# Patient Record
Sex: MALE | Race: BLACK OR AFRICAN AMERICAN | Employment: OTHER | URBAN - METROPOLITAN AREA
[De-identification: names, ages, dates, MRNs, and addresses within clinical notes are randomized per-mention and may not be internally consistent; named-entity substitution may affect disease eponyms.]

---

## 2018-06-22 ENCOUNTER — HOSPITAL ENCOUNTER (OUTPATIENT)
Age: 73
Discharge: HOME | End: 2018-06-24
Payer: MEDICARE

## 2018-06-22 LAB — PROSTATE SPECIFIC ANTIGEN: 9.53 NG/ML (ref 0–4)

## 2018-06-22 PROCEDURE — 36415 COLL VENOUS BLD VENIPUNCTURE: CPT

## 2018-06-22 PROCEDURE — G0103 PSA SCREENING: HCPCS

## 2018-07-03 ENCOUNTER — HOSPITAL ENCOUNTER (OUTPATIENT)
Age: 73
Discharge: HOME | End: 2018-07-03
Payer: MEDICARE

## 2018-07-03 LAB
BUN BLDV-MCNC: 16 MG/DL (ref 8–23)
CREAT SERPL-MCNC: 1.1 MG/DL (ref 0.7–1.2)
GFR AFRICAN AMERICAN: >60
GFR NON-AFRICAN AMERICAN: >60 ML/MIN/1.73

## 2018-07-03 PROCEDURE — 82565 ASSAY OF CREATININE: CPT

## 2018-07-03 PROCEDURE — 84520 ASSAY OF UREA NITROGEN: CPT

## 2018-07-03 PROCEDURE — 36415 COLL VENOUS BLD VENIPUNCTURE: CPT

## 2019-05-22 DIAGNOSIS — M25.562 LEFT KNEE PAIN, UNSPECIFIED CHRONICITY: Primary | ICD-10-CM

## 2019-05-24 ENCOUNTER — OFFICE VISIT (OUTPATIENT)
Dept: ORTHOPEDIC SURGERY | Age: 74
End: 2019-05-24
Payer: MEDICARE

## 2019-05-24 VITALS — HEIGHT: 71 IN | BODY MASS INDEX: 27.3 KG/M2 | WEIGHT: 195 LBS

## 2019-05-24 DIAGNOSIS — M17.12 PRIMARY OSTEOARTHRITIS OF LEFT KNEE: Primary | ICD-10-CM

## 2019-05-24 PROCEDURE — 99203 OFFICE O/P NEW LOW 30 MIN: CPT | Performed by: ORTHOPAEDIC SURGERY

## 2019-05-24 RX ORDER — AMLODIPINE BESYLATE 10 MG/1
TABLET ORAL
Refills: 3 | COMMUNITY
Start: 2019-05-03

## 2019-05-24 RX ORDER — LISINOPRIL AND HYDROCHLOROTHIAZIDE 25; 20 MG/1; MG/1
TABLET ORAL
Refills: 3 | COMMUNITY
Start: 2019-05-03

## 2019-05-24 RX ORDER — PROPRANOLOL HYDROCHLORIDE 20 MG/1
TABLET ORAL
Refills: 3 | COMMUNITY
Start: 2019-03-14

## 2019-05-24 NOTE — PROGRESS NOTES
Abdullahi Greenwood is a 74 y.o. male, who presents   Chief Complaint   Patient presents with    New Patient     new pateint for left knee pain, patient has had this pain for years, patient denies wearing a brace, Pt or injections. HPI[de-identified] Problems been present for sometime left knee. There are lesser problems with the right. There is a feeling grittiness or grinding in the left knee with range of motion. Ambulation is altered someways as our other activities. There's been a little bit of swelling in the knee on occasion. Discomfort is present more on the medial side than the lateral side. Also been feeling of instability at times. Treatments been with glucosamine and a brace in the past. Injections and no surgery. Allergies; medications; past medical, surgical, family, and social history; and problem list have been reviewed today and updated as indicated in this encounter - see below following Ortho specifics. Musculoskeletal: Skin condition gross neurovascular function is good in both lower extremities. There is some varus malalignment of both knees. The right knee range of motion is near 0-110° of flexion with some crepitus throughout the range and some medial gapping indicative of wear in the medial compartment. There is not much pain associated with this. The left knee is more tender with pain on palpation the medial joint line. There is varus malalignment and medial gapping with stress examination. Conner test was not specific for meniscus pathology. There is minimal effusion. Patellofemoral alignment is good. There is also a slight amount of anterior laxity of cruciates in the left knee. Single leg stance is maintained and gait is a little slow but fairly well balanced. Radiologic Studies: Imaging 2 views of the left knee showed varus malalignment of the knee with medial bone-on-bone contact with hypertrophic marginal changes. There is also evidence of patellofemoral wear.     ASSESSMENT:  Anuj Posadas was seen today for new patient. Diagnoses and all orders for this visit:    Primary osteoarthritis of left knee     Treatment alternatives were reviewed including medical and physical therapies, injections, and surgical options, expected risks benefits and likely outcome of each were discussed in detail, questions asked and answered and understood. Options included injections which she mentioned. We also discussed total knee replacement which would be the definitive if not desirable to him at the present time. We went over the potential risks and benefits of injection with viscus supplements in the left knee and also the approval process to assure insurance coverage and minimizes out-of-pocket expense is. PLAN: We'll seek approval for viscus supplement injections left knee. Discuss knee replacement in the future if he is interested. There is no problem list on file for this patient. Past Medical History:   Diagnosis Date    Anxiety     Hyperlipidemia     Hypertension        Past Surgical History:   Procedure Laterality Date    ADRENALECTOMY      COLONOSCOPY      PROSTATE SURGERY      UPPER GASTROINTESTINAL ENDOSCOPY         Current Outpatient Medications   Medication Sig Dispense Refill    amLODIPine (NORVASC) 10 MG tablet TAKE ONE TABLET BY MOUTH EVERY DAY  3    lisinopril-hydrochlorothiazide (PRINZIDE;ZESTORETIC) 20-25 MG per tablet TAKE ONE TABLET BY MOUTH EVERY DAY  3    propranolol (INDERAL) 20 MG tablet TAKE 1 TABLET BY MOUTH 3 TIMES A DAY  3     No current facility-administered medications for this visit.         No Known Allergies    Social History     Socioeconomic History    Marital status:      Spouse name: None    Number of children: None    Years of education: None    Highest education level: None   Occupational History    None   Social Needs    Financial resource strain: None    Food insecurity:     Worry: None     Inability: None    Transportation needs: Medical: None     Non-medical: None   Tobacco Use    Smoking status: Former Smoker    Smokeless tobacco: Never Used   Substance and Sexual Activity    Alcohol use: Yes     Alcohol/week: 0.6 oz     Types: 1 Glasses of wine per week     Comment: social    Drug use: No    Sexual activity: Never   Lifestyle    Physical activity:     Days per week: None     Minutes per session: None    Stress: None   Relationships    Social connections:     Talks on phone: None     Gets together: None     Attends Orthodox service: None     Active member of club or organization: None     Attends meetings of clubs or organizations: None     Relationship status: None    Intimate partner violence:     Fear of current or ex partner: None     Emotionally abused: None     Physically abused: None     Forced sexual activity: None   Other Topics Concern    None   Social History Narrative    None       History reviewed. No pertinent family history. Review of Systems:   As follows except as previously noted in HPI:  Constitutional: Negative for chills, diaphoresis,  fever   Respiratory: Negative for cough, shortness of breath and wheezing. Cardiovascular: Negative for chest pain and palpitations. Neurological: Negative for dizziness, syncope,   GI / : abdominal pain or cramping  Musculoskeletal: see HPI       Objective:   Physical Exam   Constitutional: Oriented to person, place, and time. and appears well-developed and well-nourished. :   Head: Normocephalic and atraumatic. Neck: Neck supple. Eyes: EOM are normal.   Pulmonary/Chest: Effort normal.  No respiratory distress, no wheezes. Neurological: Alert and oriented to person  Skin: Skin is warm and dry. Jean Torre DO    5/24/19  9:51 AM    All reasonable efforts have been made to minimize the risk of errors that may occur in the use of voice recognition and other electronic means of charting.

## 2019-06-19 ENCOUNTER — OFFICE VISIT (OUTPATIENT)
Dept: ORTHOPEDIC SURGERY | Age: 74
End: 2019-06-19
Payer: MEDICARE

## 2019-06-19 DIAGNOSIS — M17.12 PRIMARY OSTEOARTHRITIS OF LEFT KNEE: Primary | ICD-10-CM

## 2019-06-19 PROCEDURE — 99213 OFFICE O/P EST LOW 20 MIN: CPT | Performed by: ORTHOPAEDIC SURGERY

## 2019-06-19 NOTE — PROGRESS NOTES
Chief Complaint: No chief complaint on file. Edsel Mortimer is in for Synvisc in the left knee. He said no problems with the it would contraindicate the injection itself. Allergies; medications; past medical, surgical, family, and social history; and problem list have been reviewed today and updated as indicated in this encounter seen below. Exam: Condition and gross neurovascular function are good in the left knee with no inflammation. ASSESSMENT:    Diagnoses and all orders for this visit:    Primary osteoarthritis of left knee        PLAN: Aspiration / injection of the left knee with Synvisc ONEwas discussed with the patient. Discussion included but was not limited to potential risks and benefits. No contraindications to the procedure were noted. Understanding and agreement was indicated. The procedure was accomplished without incident using appropriate aseptic technique. Recovered was 6 cc yellow    joint fluid discarded  follow up as needed      Return if symptoms worsen or fail to improve. Current Outpatient Medications   Medication Sig Dispense Refill    amLODIPine (NORVASC) 10 MG tablet TAKE ONE TABLET BY MOUTH EVERY DAY  3    lisinopril-hydrochlorothiazide (PRINZIDE;ZESTORETIC) 20-25 MG per tablet TAKE ONE TABLET BY MOUTH EVERY DAY  3    propranolol (INDERAL) 20 MG tablet TAKE 1 TABLET BY MOUTH 3 TIMES A DAY  3     No current facility-administered medications for this visit. There is no problem list on file for this patient.       Past Medical History:   Diagnosis Date    Anxiety     Hyperlipidemia     Hypertension        Past Surgical History:   Procedure Laterality Date    ADRENALECTOMY      COLONOSCOPY      PROSTATE SURGERY      UPPER GASTROINTESTINAL ENDOSCOPY         No Known Allergies    Social History     Socioeconomic History    Marital status:      Spouse name: None    Number of children: None    Years of education: None    Highest education level: None   Occupational History    None   Social Needs    Financial resource strain: None    Food insecurity:     Worry: None     Inability: None    Transportation needs:     Medical: None     Non-medical: None   Tobacco Use    Smoking status: Former Smoker    Smokeless tobacco: Never Used   Substance and Sexual Activity    Alcohol use: Yes     Alcohol/week: 0.6 oz     Types: 1 Glasses of wine per week     Comment: social    Drug use: No    Sexual activity: Never   Lifestyle    Physical activity:     Days per week: None     Minutes per session: None    Stress: None   Relationships    Social connections:     Talks on phone: None     Gets together: None     Attends [employer]ist service: None     Active member of club or organization: None     Attends meetings of clubs or organizations: None     Relationship status: None    Intimate partner violence:     Fear of current or ex partner: None     Emotionally abused: None     Physically abused: None     Forced sexual activity: None   Other Topics Concern    None   Social History Narrative    None       Review of Systems  As follows except as previously noted in HPI:  Constitutional: Negative for chills, diaphoresis, fatigue, fever and unexpected weight change. Respiratory: Negative for cough, shortness of breath and wheezing. Cardiovascular: Negative for chest pain and palpitations. Neurological: Negative for dizziness, syncope, cephalgia. GI / : negative  Musculoskeletal: see HPI       Objective:   Physical Exam   Constitutional: Oriented to person, place, and time. and appears well-developed and well-nourished. :   Head: Normocephalic and atraumatic. Eyes: EOM are normal.   Neck: Neck supple. Cardiovascular: Normal rate and regular rhythm. Pulmonary/Chest: Effort normal. No stridor. No respiratory distress, no wheezes. Abdominal:  No abnormal distension. Neurological: Alert and oriented to person, place, and time.    Skin: Skin is warm and dry. Psychiatric: Normal mood and affect.  Behavior is normal. Thought content normal.    MAYA Reyes DO    6/19/19  10:41 AM

## 2019-10-29 ENCOUNTER — HOSPITAL ENCOUNTER (OUTPATIENT)
Age: 74
Discharge: HOME | End: 2019-10-29
Payer: MEDICARE

## 2019-10-29 LAB
ALBUMIN SERPL-MCNC: 4.1 G/DL (ref 3.5–5.2)
ALP BLD-CCNC: 73 U/L (ref 40–129)
ALT SERPL-CCNC: 19 U/L (ref 0–40)
ANION GAP SERPL CALCULATED.3IONS-SCNC: 8 MMOL/L (ref 7–16)
AST SERPL-CCNC: 20 U/L (ref 0–39)
BASOPHILS ABSOLUTE: 0.03 E9/L (ref 0–0.2)
BASOPHILS RELATIVE PERCENT: 0.7 % (ref 0–2)
BILIRUB SERPL-MCNC: 0.4 MG/DL (ref 0–1.2)
BUN BLDV-MCNC: 14 MG/DL (ref 8–23)
CALCIUM SERPL-MCNC: 9.5 MG/DL (ref 8.6–10.2)
CHLORIDE BLD-SCNC: 103 MMOL/L (ref 98–107)
CHOLESTEROL, FASTING: 219 MG/DL (ref 0–199)
CO2: 32 MMOL/L (ref 22–29)
CREAT SERPL-MCNC: 1 MG/DL (ref 0.7–1.2)
EOSINOPHILS ABSOLUTE: 0.15 E9/L (ref 0.05–0.5)
EOSINOPHILS RELATIVE PERCENT: 3.3 % (ref 0–6)
GFR AFRICAN AMERICAN: >60
GFR NON-AFRICAN AMERICAN: >60 ML/MIN/1.73
GLUCOSE FASTING: 101 MG/DL (ref 74–99)
HCT VFR BLD CALC: 39.5 % (ref 37–54)
HDLC SERPL-MCNC: 58 MG/DL
HEMOGLOBIN: 13.8 G/DL (ref 12.5–16.5)
IMMATURE GRANULOCYTES #: 0.01 E9/L
IMMATURE GRANULOCYTES %: 0.2 % (ref 0–5)
LDL CHOLESTEROL CALCULATED: 134 MG/DL (ref 0–99)
LYMPHOCYTES ABSOLUTE: 2.3 E9/L (ref 1.5–4)
LYMPHOCYTES RELATIVE PERCENT: 50.9 % (ref 20–42)
MCH RBC QN AUTO: 33.6 PG (ref 26–35)
MCHC RBC AUTO-ENTMCNC: 34.9 % (ref 32–34.5)
MCV RBC AUTO: 96.1 FL (ref 80–99.9)
MONOCYTES ABSOLUTE: 0.4 E9/L (ref 0.1–0.95)
MONOCYTES RELATIVE PERCENT: 8.8 % (ref 2–12)
NEUTROPHILS ABSOLUTE: 1.63 E9/L (ref 1.8–7.3)
NEUTROPHILS RELATIVE PERCENT: 36.1 % (ref 43–80)
PDW BLD-RTO: 12.3 FL (ref 11.5–15)
PLATELET # BLD: 254 E9/L (ref 130–450)
PMV BLD AUTO: 8.7 FL (ref 7–12)
POTASSIUM SERPL-SCNC: 3.8 MMOL/L (ref 3.5–5)
RBC # BLD: 4.11 E12/L (ref 3.8–5.8)
SODIUM BLD-SCNC: 143 MMOL/L (ref 132–146)
T4 FREE: 1.15 NG/DL (ref 0.93–1.7)
TOTAL PROTEIN: 7.5 G/DL (ref 6.4–8.3)
TRIGLYCERIDE, FASTING: 133 MG/DL (ref 0–149)
TSH SERPL DL<=0.05 MIU/L-ACNC: 1.84 UIU/ML (ref 0.27–4.2)
VLDLC SERPL CALC-MCNC: 27 MG/DL
WBC # BLD: 4.5 E9/L (ref 4.5–11.5)

## 2019-10-29 PROCEDURE — 36415 COLL VENOUS BLD VENIPUNCTURE: CPT

## 2019-10-29 PROCEDURE — 80053 COMPREHEN METABOLIC PANEL: CPT

## 2019-10-29 PROCEDURE — 80061 LIPID PANEL: CPT

## 2019-10-29 PROCEDURE — 84443 ASSAY THYROID STIM HORMONE: CPT

## 2019-10-29 PROCEDURE — 85025 COMPLETE CBC W/AUTO DIFF WBC: CPT

## 2019-10-29 PROCEDURE — 84439 ASSAY OF FREE THYROXINE: CPT

## 2019-12-02 ENCOUNTER — HOSPITAL ENCOUNTER (OUTPATIENT)
Age: 74
Discharge: HOME | End: 2019-12-02
Payer: MEDICARE

## 2019-12-02 LAB
CHOLESTEROL, FASTING: 245 MG/DL (ref 0–199)
HDLC SERPL-MCNC: 68 MG/DL
LDL CHOLESTEROL CALCULATED: 154 MG/DL (ref 0–99)
TRIGLYCERIDE, FASTING: 115 MG/DL (ref 0–149)
VLDLC SERPL CALC-MCNC: 23 MG/DL

## 2019-12-02 PROCEDURE — 80061 LIPID PANEL: CPT

## 2019-12-02 PROCEDURE — 36415 COLL VENOUS BLD VENIPUNCTURE: CPT

## 2021-08-05 ENCOUNTER — HOSPITAL ENCOUNTER (OUTPATIENT)
Age: 76
Discharge: HOME | End: 2021-08-05
Payer: MEDICARE

## 2021-08-05 LAB
ALBUMIN SERPL-MCNC: 4.2 G/DL (ref 3.5–5.2)
ALP BLD-CCNC: 74 U/L (ref 40–129)
ALT SERPL-CCNC: 24 U/L (ref 0–40)
ANION GAP SERPL CALCULATED.3IONS-SCNC: 7 MMOL/L (ref 7–16)
AST SERPL-CCNC: 25 U/L (ref 0–39)
BASOPHILS ABSOLUTE: 0.03 E9/L (ref 0–0.2)
BASOPHILS RELATIVE PERCENT: 0.7 % (ref 0–2)
BILIRUB SERPL-MCNC: 0.5 MG/DL (ref 0–1.2)
BUN BLDV-MCNC: 14 MG/DL (ref 6–23)
CALCIUM SERPL-MCNC: 9.3 MG/DL (ref 8.6–10.2)
CHLORIDE BLD-SCNC: 104 MMOL/L (ref 98–107)
CHOLESTEROL, TOTAL: 178 MG/DL (ref 0–199)
CO2: 30 MMOL/L (ref 22–29)
CREAT SERPL-MCNC: 0.9 MG/DL (ref 0.7–1.2)
EOSINOPHILS ABSOLUTE: 0.17 E9/L (ref 0.05–0.5)
EOSINOPHILS RELATIVE PERCENT: 4 % (ref 0–6)
GFR AFRICAN AMERICAN: >60
GFR NON-AFRICAN AMERICAN: >60 ML/MIN/1.73
GLUCOSE BLD-MCNC: 96 MG/DL (ref 74–99)
HCT VFR BLD CALC: 39.2 % (ref 37–54)
HDLC SERPL-MCNC: 66 MG/DL
HEMOGLOBIN: 13.8 G/DL (ref 12.5–16.5)
IMMATURE GRANULOCYTES #: 0 E9/L
IMMATURE GRANULOCYTES %: 0 % (ref 0–5)
LDL CHOLESTEROL CALCULATED: 95 MG/DL (ref 0–99)
LYMPHOCYTES ABSOLUTE: 2.35 E9/L (ref 1.5–4)
LYMPHOCYTES RELATIVE PERCENT: 55.2 % (ref 20–42)
MCH RBC QN AUTO: 33 PG (ref 26–35)
MCHC RBC AUTO-ENTMCNC: 35.2 % (ref 32–34.5)
MCV RBC AUTO: 93.8 FL (ref 80–99.9)
MONOCYTES ABSOLUTE: 0.41 E9/L (ref 0.1–0.95)
MONOCYTES RELATIVE PERCENT: 9.6 % (ref 2–12)
NEUTROPHILS ABSOLUTE: 1.3 E9/L (ref 1.8–7.3)
NEUTROPHILS RELATIVE PERCENT: 30.5 % (ref 43–80)
PDW BLD-RTO: 12.4 FL (ref 11.5–15)
PLATELET # BLD: 168 E9/L (ref 130–450)
PMV BLD AUTO: 9.1 FL (ref 7–12)
POTASSIUM SERPL-SCNC: 3.7 MMOL/L (ref 3.5–5)
PROLACTIN: 13.81 NG/ML
RBC # BLD: 4.18 E12/L (ref 3.8–5.8)
SODIUM BLD-SCNC: 141 MMOL/L (ref 132–146)
T4 TOTAL: 5.3 MCG/DL (ref 4.5–11.7)
TOTAL PROTEIN: 7.3 G/DL (ref 6.4–8.3)
TRIGL SERPL-MCNC: 87 MG/DL (ref 0–149)
TSH SERPL DL<=0.05 MIU/L-ACNC: 2.4 UIU/ML (ref 0.27–4.2)
VLDLC SERPL CALC-MCNC: 17 MG/DL
WBC # BLD: 4.3 E9/L (ref 4.5–11.5)

## 2021-08-05 PROCEDURE — 80053 COMPREHEN METABOLIC PANEL: CPT

## 2021-08-05 PROCEDURE — 84146 ASSAY OF PROLACTIN: CPT

## 2021-08-05 PROCEDURE — 84270 ASSAY OF SEX HORMONE GLOBUL: CPT

## 2021-08-05 PROCEDURE — 36415 COLL VENOUS BLD VENIPUNCTURE: CPT

## 2021-08-05 PROCEDURE — 85025 COMPLETE CBC W/AUTO DIFF WBC: CPT

## 2021-08-05 PROCEDURE — 80061 LIPID PANEL: CPT

## 2021-08-05 PROCEDURE — 84443 ASSAY THYROID STIM HORMONE: CPT

## 2021-08-05 PROCEDURE — 84403 ASSAY OF TOTAL TESTOSTERONE: CPT

## 2021-08-05 PROCEDURE — 84436 ASSAY OF TOTAL THYROXINE: CPT

## 2021-08-06 LAB
SEX HORMONE BINDING GLOBULIN: 47 NMOL/L (ref 11–80)
TESTOSTERONE FREE-NONMALE: 54.8 PG/ML (ref 47–244)
TESTOSTERONE TOTAL: 345 NG/DL (ref 220–1000)

## 2022-04-12 ENCOUNTER — HOSPITAL ENCOUNTER (EMERGENCY)
Age: 77
Discharge: HOME | End: 2022-04-12
Payer: MEDICARE

## 2022-04-12 ENCOUNTER — APPOINTMENT (OUTPATIENT)
Dept: CT IMAGING | Age: 77
End: 2022-04-12
Payer: MEDICARE

## 2022-04-12 ENCOUNTER — APPOINTMENT (OUTPATIENT)
Dept: GENERAL RADIOLOGY | Age: 77
End: 2022-04-12
Payer: MEDICARE

## 2022-04-12 VITALS
OXYGEN SATURATION: 99 % | WEIGHT: 195 LBS | BODY MASS INDEX: 27.3 KG/M2 | SYSTOLIC BLOOD PRESSURE: 148 MMHG | RESPIRATION RATE: 18 BRPM | DIASTOLIC BLOOD PRESSURE: 68 MMHG | HEIGHT: 71 IN | TEMPERATURE: 97.8 F | HEART RATE: 66 BPM

## 2022-04-12 DIAGNOSIS — M54.50 ACUTE EXACERBATION OF CHRONIC LOW BACK PAIN: Primary | ICD-10-CM

## 2022-04-12 DIAGNOSIS — G89.29 ACUTE EXACERBATION OF CHRONIC LOW BACK PAIN: Primary | ICD-10-CM

## 2022-04-12 DIAGNOSIS — M54.32 SCIATICA OF LEFT SIDE: ICD-10-CM

## 2022-04-12 DIAGNOSIS — M25.552 LEFT HIP PAIN: ICD-10-CM

## 2022-04-12 PROCEDURE — 6370000000 HC RX 637 (ALT 250 FOR IP): Performed by: PHYSICIAN ASSISTANT

## 2022-04-12 PROCEDURE — 73502 X-RAY EXAM HIP UNI 2-3 VIEWS: CPT

## 2022-04-12 PROCEDURE — 72131 CT LUMBAR SPINE W/O DYE: CPT

## 2022-04-12 PROCEDURE — 99284 EMERGENCY DEPT VISIT MOD MDM: CPT

## 2022-04-12 RX ORDER — PREDNISONE 20 MG/1
60 TABLET ORAL ONCE
Status: COMPLETED | OUTPATIENT
Start: 2022-04-12 | End: 2022-04-12

## 2022-04-12 RX ORDER — LIDOCAINE 50 MG/G
1 PATCH TOPICAL EVERY 24 HOURS
Qty: 10 PATCH | Refills: 0 | Status: SHIPPED | OUTPATIENT
Start: 2022-04-12 | End: 2022-04-22

## 2022-04-12 RX ORDER — ORPHENADRINE CITRATE 100 MG/1
100 TABLET, EXTENDED RELEASE ORAL 2 TIMES DAILY
Qty: 20 TABLET | Refills: 0 | Status: SHIPPED | OUTPATIENT
Start: 2022-04-12 | End: 2022-04-22

## 2022-04-12 RX ORDER — HYDROCODONE BITARTRATE AND ACETAMINOPHEN 5; 325 MG/1; MG/1
1 TABLET ORAL ONCE
Status: COMPLETED | OUTPATIENT
Start: 2022-04-12 | End: 2022-04-12

## 2022-04-12 RX ORDER — HYDROCODONE BITARTRATE AND ACETAMINOPHEN 5; 325 MG/1; MG/1
1 TABLET ORAL EVERY 6 HOURS PRN
Qty: 12 TABLET | Refills: 0 | Status: SHIPPED | OUTPATIENT
Start: 2022-04-12 | End: 2022-04-15

## 2022-04-12 RX ADMIN — HYDROCODONE BITARTRATE AND ACETAMINOPHEN 1 TABLET: 5; 325 TABLET ORAL at 17:36

## 2022-04-12 RX ADMIN — PREDNISONE 60 MG: 20 TABLET ORAL at 17:37

## 2022-04-12 ASSESSMENT — PAIN SCALES - GENERAL
PAINLEVEL_OUTOF10: 10
PAINLEVEL_OUTOF10: 9

## 2022-04-12 ASSESSMENT — PAIN DESCRIPTION - LOCATION: LOCATION: HIP;BACK

## 2022-04-12 ASSESSMENT — PAIN DESCRIPTION - ORIENTATION: ORIENTATION: LEFT

## 2022-04-12 ASSESSMENT — PAIN DESCRIPTION - ONSET: ONSET: AWAKENED FROM SLEEP

## 2022-04-12 NOTE — ED NOTES
Department of Emergency Medicine  FIRST PROVIDER TRIAGE NOTE             Independent MLP           4/12/22  1:51 PM EDT    Date of Encounter: 4/12/22   MRN: [id number]      HPI: Jonathan Enamorado is a 64 y.o. male who presents to the ED for No chief complaint on file.   hip and back pain     ROS: Negative for abd pain, fever or urinary complaints. PE: Gen Appearance/Constitutional: alert  CV: regular rate  Pulm: CTA bilat     Initial Plan of Care: All treatment areas with department are currently occupied. Plan to order/Initiate the following while awaiting opening in ED: imaging studies.   Initiate Treatment-Testing, Proceed toTreatment Area When Bed Available for ED Attending/MLP to Continue Care    Electronically signed by STEFFEN Garrison   DD: 4/12/22       STEFFEN Garrison  04/12/22 9740

## 2022-04-12 NOTE — ED PROVIDER NOTES
Independent MLP  HPI:  4/12/22, Time: 3:32 PM EDT         Dominic Musa is a 64 y.o. male presenting to the ED for back, left hip, beginning chronic worse couple of days ago. The complaint has been persistent, moderate in severity, and worsened by movement of back, left hip, walking. Patient  with history of chronic back pain progressively worse over the last couple days now radiating towards his left hip. He has pain with ambulation. Denies any abdominal pain no urinary or bowel incontinence no paresthesias. No fever chills. Pain is worse with move    Review of Systems:   A complete review of systems was performed and pertinent positives and negatives are stated within HPI, all other systems reviewed and are negative.          --------------------------------------------- PAST HISTORY ---------------------------------------------  Past Medical History:  has a past medical history of Anxiety, Hyperlipidemia, and Hypertension. Past Surgical History:  has a past surgical history that includes Prostate surgery; Adrenalectomy; Colonoscopy; and Upper gastrointestinal endoscopy. Social History:  reports that he has quit smoking. He has never used smokeless tobacco. He reports current alcohol use of about 1.0 standard drink of alcohol per week. He reports that he does not use drugs. Family History: family history is not on file. The patients home medications have been reviewed. Allergies: Patient has no known allergies. -------------------------------------------------- RESULTS -------------------------------------------------  All laboratory and radiology results have been personally reviewed by myself   LABS:  No results found for this visit on 04/12/22. RADIOLOGY:  Interpreted by Radiologist.  XR HIP LEFT (2-3 VIEWS)   Final Result   Mild degenerative change at the left hip without acute bony abnormality.          CT LUMBAR SPINE WO CONTRAST   Final Result   Unremarkable non-contrast CT of the lumbar spine. RECOMMENDATIONS:   1. Canal stenosis secondary to degenerative disc disease and facet   arthropathy at L2-3 and L3-4.      2.  Moderate right convexity upper lumbar rotoscoliosis      3. No acute fracture or subluxation detected.             ------------------------- NURSING NOTES AND VITALS REVIEWED ---------------------------   The nursing notes within the ED encounter and vital signs as below have been reviewed. BP (!) 148/68   Pulse 68   Temp 97.4 °F (36.3 °C)   Ht 5' 11\" (1.803 m)   Wt 195 lb (88.5 kg)   SpO2 99%   BMI 27.20 kg/m²   Oxygen Saturation Interpretation: Normal      ---------------------------------------------------PHYSICAL EXAM--------------------------------------      Constitutional/General: Alert and oriented x3, well appearing, non toxic in NAD  Head: Normocephalic and atraumatic  Eyes: PERRL, EOMI  Mouth: Oropharynx clear, handling secretions, no trismus  Neck: Supple, full ROM,   Pulmonary: Lungs clear to auscultation bilaterally, no wheezes, rales, or rhonchi. Not in respiratory distress  Cardiovascular:  Regular rate and rhythm, no murmurs, gallops, or rubs. 2+ distal pulses  Abdomen: Soft, non tender, non distended,   Extremities: Moves all extremities x 4. Warm and well perfused  Skin: warm and dry without rash  Neurologic: GCS 15,  Psych: Normal Affect      ------------------------------ ED COURSE/MEDICAL DECISION MAKING----------------------  Medications   HYDROcodone-acetaminophen (NORCO) 5-325 MG per tablet 1 tablet (has no administration in time range)   predniSONE (DELTASONE) tablet 60 mg (has no administration in time range)         ED COURSE:       Medical Decision Making:    Patient has history of chronic back pain that is been worse over the last few days now involving the left hip. Denied any recent injury. No fevers. No sign of cauda equina or discitis. X-ray of the hip, no fracture or dislocation degenerative changes.   CT lumbar showing canal stenosis secondary to degenerative disc disease. Patient was treated for sciatica. He is to follow-up with the primary care     Counseling: The emergency provider has spoken with the patient and discussed todays results, in addition to providing specific details for the plan of care and counseling regarding the diagnosis and prognosis. Questions are answered at this time and they are agreeable with the plan.      --------------------------------- IMPRESSION AND DISPOSITION ---------------------------------    IMPRESSION  1. Acute exacerbation of chronic low back pain    2. Left hip pain    3. Sciatica of left side        DISPOSITION  Disposition: Discharge to home  Patient condition is good      NOTE: This report was transcribed using voice recognition software.  Every effort was made to ensure accuracy; however, inadvertent computerized transcription errors may be present     Analisa Ramos  04/12/22 1739

## 2023-02-16 ENCOUNTER — HOSPITAL ENCOUNTER (OUTPATIENT)
Age: 78
Discharge: HOME | End: 2023-02-16
Payer: MEDICARE

## 2023-02-16 LAB
ALBUMIN SERPL-MCNC: 4.1 G/DL (ref 3.5–5.2)
ALP BLD-CCNC: 70 U/L (ref 40–129)
ALT SERPL-CCNC: 25 U/L (ref 0–40)
ANION GAP SERPL CALCULATED.3IONS-SCNC: 4 MMOL/L (ref 7–16)
AST SERPL-CCNC: 23 U/L (ref 0–39)
BASOPHILS ABSOLUTE: 0.03 E9/L (ref 0–0.2)
BASOPHILS RELATIVE PERCENT: 0.7 % (ref 0–2)
BILIRUB SERPL-MCNC: 0.3 MG/DL (ref 0–1.2)
BUN BLDV-MCNC: 14 MG/DL (ref 6–23)
CALCIUM SERPL-MCNC: 9.1 MG/DL (ref 8.6–10.2)
CHLORIDE BLD-SCNC: 102 MMOL/L (ref 98–107)
CHOLESTEROL, FASTING: 174 MG/DL (ref 0–199)
CO2: 32 MMOL/L (ref 22–29)
CREAT SERPL-MCNC: 1 MG/DL (ref 0.7–1.2)
EOSINOPHILS ABSOLUTE: 0.13 E9/L (ref 0.05–0.5)
EOSINOPHILS RELATIVE PERCENT: 3.2 % (ref 0–6)
GFR SERPL CREATININE-BSD FRML MDRD: >60 ML/MIN/1.73
GLUCOSE BLD-MCNC: 102 MG/DL (ref 74–99)
HCT VFR BLD CALC: 36.8 % (ref 37–54)
HDLC SERPL-MCNC: 67 MG/DL
HEMOGLOBIN: 13 G/DL (ref 12.5–16.5)
IMMATURE GRANULOCYTES #: 0.01 E9/L
IMMATURE GRANULOCYTES %: 0.2 % (ref 0–5)
LDL CHOLESTEROL CALCULATED: ABNORMAL MG/DL (ref 0–99)
LYMPHOCYTES ABSOLUTE: 2.15 E9/L (ref 1.5–4)
LYMPHOCYTES RELATIVE PERCENT: 52.8 % (ref 20–42)
MCH RBC QN AUTO: 33.8 PG (ref 26–35)
MCHC RBC AUTO-ENTMCNC: 35.3 % (ref 32–34.5)
MCV RBC AUTO: 95.6 FL (ref 80–99.9)
MONOCYTES ABSOLUTE: 0.35 E9/L (ref 0.1–0.95)
MONOCYTES RELATIVE PERCENT: 8.6 % (ref 2–12)
NEUTROPHILS ABSOLUTE: 1.4 E9/L (ref 1.8–7.3)
NEUTROPHILS RELATIVE PERCENT: 34.5 % (ref 43–80)
PDW BLD-RTO: 12.6 FL (ref 11.5–15)
PLATELET # BLD: 172 E9/L (ref 130–450)
PMV BLD AUTO: 9.4 FL (ref 7–12)
POTASSIUM SERPL-SCNC: 3.6 MMOL/L (ref 3.5–5)
RBC # BLD: 3.85 E12/L (ref 3.8–5.8)
SODIUM BLD-SCNC: 138 MMOL/L (ref 132–146)
T4 TOTAL: 5.7 MCG/DL (ref 4.5–11.7)
TOTAL PROTEIN: 6.8 G/DL (ref 6.4–8.3)
TRIGLYCERIDE, FASTING: 449 MG/DL (ref 0–149)
TSH SERPL DL<=0.05 MIU/L-ACNC: 2.42 UIU/ML (ref 0.27–4.2)
VLDLC SERPL CALC-MCNC: ABNORMAL MG/DL
WBC # BLD: 4.1 E9/L (ref 4.5–11.5)

## 2023-02-16 PROCEDURE — 85025 COMPLETE CBC W/AUTO DIFF WBC: CPT

## 2023-02-16 PROCEDURE — 84443 ASSAY THYROID STIM HORMONE: CPT

## 2023-02-16 PROCEDURE — 80061 LIPID PANEL: CPT

## 2023-02-16 PROCEDURE — 84436 ASSAY OF TOTAL THYROXINE: CPT

## 2023-02-16 PROCEDURE — 80053 COMPREHEN METABOLIC PANEL: CPT

## 2023-02-16 PROCEDURE — 36415 COLL VENOUS BLD VENIPUNCTURE: CPT

## 2023-06-22 ENCOUNTER — HOSPITAL ENCOUNTER (EMERGENCY)
Age: 78
Discharge: HOME | End: 2023-06-22
Payer: MEDICARE

## 2023-06-22 VITALS
SYSTOLIC BLOOD PRESSURE: 145 MMHG | WEIGHT: 196 LBS | OXYGEN SATURATION: 100 % | HEART RATE: 64 BPM | DIASTOLIC BLOOD PRESSURE: 80 MMHG | BODY MASS INDEX: 27.44 KG/M2 | HEIGHT: 71 IN | RESPIRATION RATE: 18 BRPM | TEMPERATURE: 97.5 F

## 2023-06-22 DIAGNOSIS — G89.29 ACUTE EXACERBATION OF CHRONIC LOW BACK PAIN: Primary | ICD-10-CM

## 2023-06-22 DIAGNOSIS — M54.50 ACUTE EXACERBATION OF CHRONIC LOW BACK PAIN: Primary | ICD-10-CM

## 2023-06-22 LAB
BILIRUB UR QL STRIP: NEGATIVE
CLARITY UR: CLEAR
COLOR UR: YELLOW
GLUCOSE UR STRIP-MCNC: NEGATIVE MG/DL
HGB UR QL STRIP: NEGATIVE
KETONES UR STRIP-MCNC: NEGATIVE MG/DL
LEUKOCYTE ESTERASE UR QL STRIP: NEGATIVE
NITRITE UR QL STRIP: NEGATIVE
PH UR STRIP: 7 [PH] (ref 5–9)
PROT UR STRIP-MCNC: NEGATIVE MG/DL
SP GR UR STRIP: 1.01 (ref 1–1.03)
UROBILINOGEN UR STRIP-ACNC: 0.2 E.U./DL

## 2023-06-22 PROCEDURE — 99211 OFF/OP EST MAY X REQ PHY/QHP: CPT

## 2023-06-22 PROCEDURE — 81003 URINALYSIS AUTO W/O SCOPE: CPT

## 2023-06-22 ASSESSMENT — PAIN - FUNCTIONAL ASSESSMENT: PAIN_FUNCTIONAL_ASSESSMENT: NONE - DENIES PAIN

## 2023-06-22 NOTE — ED PROVIDER NOTES
[facility]  EMERGENCY DEPARTMENT ENCOUNTER        NAME: Melissa Loja  :  1945  MRN:  [id number]  Date of evaluation: 2023  Provider: Jennifer Knox PA-C  PCP: Prem Meehan DO  Note Started : 1:26 PM EDT 23    Chief Complaint: Flank Pain (Right side )      This is a 64-year-old male that presents to urgent care with some right lumbar back soft tissue tenderness. He denies fall but does state history of back problems. He denies any burning or frequency with urination. No blood in his urine or stool. He denies any radiation of pain to the flank or to the abdomen. He denies any numbness or tingling. No bowel or bladder dysfunction. He states pain is worse with movement. On first contact patient he appears to be in no acute distress. Review of Systems  Pertinent positives and negatives are stated within HPI, all other systems reviewed and are negative. Allergies: Patient has no known allergies. --------------------------------------------- PAST HISTORY ---------------------------------------------  Past Medical History:  has a past medical history of Anxiety, Hyperlipidemia, and Hypertension. Past Surgical History:  has a past surgical history that includes Prostate surgery; Adrenalectomy; Colonoscopy; and Upper gastrointestinal endoscopy. Social History:  reports that he has quit smoking. He has never used smokeless tobacco. He reports current alcohol use of about 1.0 standard drink per week. He reports that he does not use drugs. Family History: family history is not on file. The patients home medications have been reviewed. The nursing notes within the ED encounter have been reviewed.      ------------------------------------------------SCREENINGS----------------------------------------------                        CIWA Assessment  BP: (!) 145/80  Pulse: 64           ---------------------------------------------PHYSICAL EXAM

## 2023-06-22 NOTE — DISCHARGE INSTRUCTIONS
Take tylenol up to 1000 mg every 8 hours for pain. May use topical medications such as biofreeze/aspercreme/lidocaine patch    Urine test negative for blood or infection. If symptoms worsen go to the emergency department.

## 2023-08-02 ENCOUNTER — HOSPITAL ENCOUNTER (OUTPATIENT)
Age: 78
Discharge: HOME | End: 2023-08-02
Payer: MEDICARE

## 2023-08-02 LAB
CHOLEST SERPL-MCNC: 170 MG/DL
HDLC SERPL-MCNC: 69 MG/DL
LDLC SERPL CALC-MCNC: 81 MG/DL
TRIGL SERPL-MCNC: 98 MG/DL
VLDLC SERPL CALC-MCNC: 20 MG/DL

## 2023-08-02 PROCEDURE — 80061 LIPID PANEL: CPT

## 2023-08-02 PROCEDURE — 36415 COLL VENOUS BLD VENIPUNCTURE: CPT

## 2023-12-28 ENCOUNTER — APPOINTMENT (OUTPATIENT)
Dept: GENERAL RADIOLOGY | Age: 78
DRG: 180 | End: 2023-12-28
Payer: MEDICARE

## 2023-12-28 ENCOUNTER — APPOINTMENT (OUTPATIENT)
Dept: ULTRASOUND IMAGING | Age: 78
DRG: 180 | End: 2023-12-28
Payer: MEDICARE

## 2023-12-28 ENCOUNTER — HOSPITAL ENCOUNTER (INPATIENT)
Age: 78
LOS: 13 days | Discharge: HOME HEALTH | DRG: 180 | End: 2024-01-11
Attending: STUDENT IN AN ORGANIZED HEALTH CARE EDUCATION/TRAINING PROGRAM | Admitting: INTERNAL MEDICINE
Payer: MEDICARE

## 2023-12-28 DIAGNOSIS — J90 PLEURAL EFFUSION: ICD-10-CM

## 2023-12-28 DIAGNOSIS — C79.9 METASTATIC MALIGNANT NEOPLASM, UNSPECIFIED SITE (HCC): ICD-10-CM

## 2023-12-28 DIAGNOSIS — E87.1 HYPONATREMIA: Primary | ICD-10-CM

## 2023-12-28 DIAGNOSIS — J95.811 POSTPROCEDURAL PNEUMOTHORAX: ICD-10-CM

## 2023-12-28 LAB
ALBUMIN SERPL-MCNC: 3.9 G/DL (ref 3.5–5.2)
ALP SERPL-CCNC: 434 U/L (ref 40–129)
ALT SERPL-CCNC: 13 U/L (ref 0–40)
ANION GAP SERPL CALCULATED.3IONS-SCNC: 10 MMOL/L (ref 7–16)
AST SERPL-CCNC: 19 U/L (ref 0–39)
BASOPHILS # BLD: 0.03 K/UL (ref 0–0.2)
BASOPHILS NFR BLD: 0 % (ref 0–2)
BILIRUB SERPL-MCNC: 0.4 MG/DL (ref 0–1.2)
BNP SERPL-MCNC: 91 PG/ML (ref 0–450)
BUN SERPL-MCNC: 10 MG/DL (ref 6–23)
CALCIUM SERPL-MCNC: 9.4 MG/DL (ref 8.6–10.2)
CHLORIDE SERPL-SCNC: 82 MMOL/L (ref 98–107)
CO2 SERPL-SCNC: 28 MMOL/L (ref 22–29)
CREAT SERPL-MCNC: 0.8 MG/DL (ref 0.7–1.2)
CREAT UR-MCNC: 242.4 MG/DL (ref 40–278)
EKG ATRIAL RATE: 91 BPM
EKG P AXIS: 62 DEGREES
EKG P-R INTERVAL: 128 MS
EKG Q-T INTERVAL: 348 MS
EKG QRS DURATION: 92 MS
EKG QTC CALCULATION (BAZETT): 428 MS
EKG R AXIS: 42 DEGREES
EKG T AXIS: 41 DEGREES
EKG VENTRICULAR RATE: 91 BPM
EOSINOPHIL # BLD: 0.02 K/UL (ref 0.05–0.5)
EOSINOPHILS RELATIVE PERCENT: 0 % (ref 0–6)
ERYTHROCYTE [DISTWIDTH] IN BLOOD BY AUTOMATED COUNT: 11.7 % (ref 11.5–15)
GFR SERPL CREATININE-BSD FRML MDRD: >60 ML/MIN/1.73M2
GLUCOSE SERPL-MCNC: 121 MG/DL (ref 74–99)
HCT VFR BLD AUTO: 37.8 % (ref 37–54)
HGB BLD-MCNC: 13.9 G/DL (ref 12.5–16.5)
IMM GRANULOCYTES # BLD AUTO: 0.03 K/UL (ref 0–0.58)
IMM GRANULOCYTES NFR BLD: 0 % (ref 0–5)
INFLUENZA A BY PCR: NOT DETECTED
INFLUENZA B BY PCR: NOT DETECTED
LDH SERPL-CCNC: 402 U/L (ref 135–225)
LYMPHOCYTES NFR BLD: 1.06 K/UL (ref 1.5–4)
LYMPHOCYTES RELATIVE PERCENT: 14 % (ref 20–42)
MAGNESIUM SERPL-MCNC: 2 MG/DL (ref 1.6–2.6)
MCH RBC QN AUTO: 32.9 PG (ref 26–35)
MCHC RBC AUTO-ENTMCNC: 36.8 G/DL (ref 32–34.5)
MCV RBC AUTO: 89.4 FL (ref 80–99.9)
MONOCYTES NFR BLD: 1.08 K/UL (ref 0.1–0.95)
MONOCYTES NFR BLD: 15 % (ref 2–12)
NEUTROPHILS NFR BLD: 70 % (ref 43–80)
NEUTS SEG NFR BLD: 5.22 K/UL (ref 1.8–7.3)
OSMOLALITY UR: 574 MOSM/KG (ref 300–900)
PLATELET # BLD AUTO: 255 K/UL (ref 130–450)
PMV BLD AUTO: 8.4 FL (ref 7–12)
POTASSIUM SERPL-SCNC: 2.9 MMOL/L (ref 3.5–5)
PROT SERPL-MCNC: 7 G/DL (ref 6.4–8.3)
RBC # BLD AUTO: 4.23 M/UL (ref 3.8–5.8)
SARS-COV-2 RDRP RESP QL NAA+PROBE: NOT DETECTED
SODIUM SERPL-SCNC: 120 MMOL/L (ref 132–146)
SODIUM UR-SCNC: 27 MMOL/L
SPECIMEN DESCRIPTION: NORMAL
TROPONIN I SERPL HS-MCNC: 18 NG/L (ref 0–11)
TROPONIN I SERPL HS-MCNC: 21 NG/L (ref 0–11)
URATE SERPL-MCNC: 4.3 MG/DL (ref 3.4–7)
WBC OTHER # BLD: 7.4 K/UL (ref 4.5–11.5)

## 2023-12-28 PROCEDURE — 83615 LACTATE (LD) (LDH) ENZYME: CPT

## 2023-12-28 PROCEDURE — 84550 ASSAY OF BLOOD/URIC ACID: CPT

## 2023-12-28 PROCEDURE — 93970 EXTREMITY STUDY: CPT

## 2023-12-28 PROCEDURE — 96374 THER/PROPH/DIAG INJ IV PUSH: CPT

## 2023-12-28 PROCEDURE — 94664 DEMO&/EVAL PT USE INHALER: CPT

## 2023-12-28 PROCEDURE — 99291 CRITICAL CARE FIRST HOUR: CPT

## 2023-12-28 PROCEDURE — 71046 X-RAY EXAM CHEST 2 VIEWS: CPT

## 2023-12-28 PROCEDURE — 6360000002 HC RX W HCPCS: Performed by: STUDENT IN AN ORGANIZED HEALTH CARE EDUCATION/TRAINING PROGRAM

## 2023-12-28 PROCEDURE — 94640 AIRWAY INHALATION TREATMENT: CPT

## 2023-12-28 PROCEDURE — 82570 ASSAY OF URINE CREATININE: CPT

## 2023-12-28 PROCEDURE — 87502 INFLUENZA DNA AMP PROBE: CPT

## 2023-12-28 PROCEDURE — 84300 ASSAY OF URINE SODIUM: CPT

## 2023-12-28 PROCEDURE — 84484 ASSAY OF TROPONIN QUANT: CPT

## 2023-12-28 PROCEDURE — 99285 EMERGENCY DEPT VISIT HI MDM: CPT

## 2023-12-28 PROCEDURE — 83880 ASSAY OF NATRIURETIC PEPTIDE: CPT

## 2023-12-28 PROCEDURE — 83735 ASSAY OF MAGNESIUM: CPT

## 2023-12-28 PROCEDURE — 87635 SARS-COV-2 COVID-19 AMP PRB: CPT

## 2023-12-28 PROCEDURE — 93010 ELECTROCARDIOGRAM REPORT: CPT | Performed by: INTERNAL MEDICINE

## 2023-12-28 PROCEDURE — 83935 ASSAY OF URINE OSMOLALITY: CPT

## 2023-12-28 PROCEDURE — 93005 ELECTROCARDIOGRAM TRACING: CPT | Performed by: STUDENT IN AN ORGANIZED HEALTH CARE EDUCATION/TRAINING PROGRAM

## 2023-12-28 PROCEDURE — 6370000000 HC RX 637 (ALT 250 FOR IP): Performed by: STUDENT IN AN ORGANIZED HEALTH CARE EDUCATION/TRAINING PROGRAM

## 2023-12-28 PROCEDURE — 80053 COMPREHEN METABOLIC PANEL: CPT

## 2023-12-28 PROCEDURE — 85025 COMPLETE CBC W/AUTO DIFF WBC: CPT

## 2023-12-28 RX ORDER — POTASSIUM CHLORIDE 20 MEQ/1
40 TABLET, EXTENDED RELEASE ORAL ONCE
Status: COMPLETED | OUTPATIENT
Start: 2023-12-28 | End: 2023-12-29

## 2023-12-28 RX ORDER — DOXYCYCLINE HYCLATE 100 MG/1
100 CAPSULE ORAL ONCE
Status: COMPLETED | OUTPATIENT
Start: 2023-12-28 | End: 2023-12-29

## 2023-12-28 RX ORDER — IPRATROPIUM BROMIDE AND ALBUTEROL SULFATE 2.5; .5 MG/3ML; MG/3ML
2 SOLUTION RESPIRATORY (INHALATION) ONCE
Status: COMPLETED | OUTPATIENT
Start: 2023-12-28 | End: 2023-12-28

## 2023-12-28 RX ADMIN — METHYLPREDNISOLONE SODIUM SUCCINATE 80 MG: 125 INJECTION, POWDER, FOR SOLUTION INTRAMUSCULAR; INTRAVENOUS at 20:27

## 2023-12-28 RX ADMIN — IPRATROPIUM BROMIDE AND ALBUTEROL SULFATE 2 DOSE: .5; 3 SOLUTION RESPIRATORY (INHALATION) at 20:56

## 2023-12-28 ASSESSMENT — LIFESTYLE VARIABLES: HOW OFTEN DO YOU HAVE A DRINK CONTAINING ALCOHOL: NEVER

## 2023-12-29 ENCOUNTER — APPOINTMENT (OUTPATIENT)
Dept: NUCLEAR MEDICINE | Age: 78
DRG: 180 | End: 2023-12-29
Payer: MEDICARE

## 2023-12-29 ENCOUNTER — APPOINTMENT (OUTPATIENT)
Dept: GENERAL RADIOLOGY | Age: 78
DRG: 180 | End: 2023-12-29
Payer: MEDICARE

## 2023-12-29 ENCOUNTER — APPOINTMENT (OUTPATIENT)
Dept: INTERVENTIONAL RADIOLOGY/VASCULAR | Age: 78
DRG: 180 | End: 2023-12-29
Payer: MEDICARE

## 2023-12-29 ENCOUNTER — APPOINTMENT (OUTPATIENT)
Age: 78
DRG: 180 | End: 2023-12-29
Payer: MEDICARE

## 2023-12-29 ENCOUNTER — APPOINTMENT (OUTPATIENT)
Dept: CT IMAGING | Age: 78
DRG: 180 | End: 2023-12-29
Payer: MEDICARE

## 2023-12-29 PROBLEM — E87.1 HYPONATREMIA: Status: ACTIVE | Noted: 2023-12-29

## 2023-12-29 LAB
ALBUMIN SERPL-MCNC: 3.5 G/DL (ref 3.5–5.2)
ALBUMIN SERPL-MCNC: 3.6 G/DL (ref 3.5–5.2)
ALP SERPL-CCNC: 375 U/L (ref 40–129)
ALP SERPL-CCNC: 392 U/L (ref 40–129)
ALT SERPL-CCNC: 12 U/L (ref 0–40)
ALT SERPL-CCNC: 12 U/L (ref 0–40)
ANION GAP SERPL CALCULATED.3IONS-SCNC: 10 MMOL/L (ref 7–16)
ANION GAP SERPL CALCULATED.3IONS-SCNC: 12 MMOL/L (ref 7–16)
ANION GAP SERPL CALCULATED.3IONS-SCNC: 13 MMOL/L (ref 7–16)
ANION GAP SERPL CALCULATED.3IONS-SCNC: 13 MMOL/L (ref 7–16)
APPEARANCE FLD: ABNORMAL
AST SERPL-CCNC: 16 U/L (ref 0–39)
AST SERPL-CCNC: 17 U/L (ref 0–39)
B PARAP IS1001 DNA NPH QL NAA+NON-PROBE: NOT DETECTED
B PERT DNA SPEC QL NAA+PROBE: NOT DETECTED
BASOPHILS # BLD: 0.01 K/UL (ref 0–0.2)
BASOPHILS # BLD: 0.01 K/UL (ref 0–0.2)
BASOPHILS NFR BLD: 0 % (ref 0–2)
BASOPHILS NFR BLD: 0 % (ref 0–2)
BILIRUB SERPL-MCNC: 0.3 MG/DL (ref 0–1.2)
BILIRUB SERPL-MCNC: 0.3 MG/DL (ref 0–1.2)
BODY FLD TYPE: ABNORMAL
BUN SERPL-MCNC: 10 MG/DL (ref 6–23)
BUN SERPL-MCNC: 11 MG/DL (ref 6–23)
BUN SERPL-MCNC: 11 MG/DL (ref 6–23)
BUN SERPL-MCNC: 14 MG/DL (ref 6–23)
C PNEUM DNA NPH QL NAA+NON-PROBE: NOT DETECTED
CALCIUM SERPL-MCNC: 8.6 MG/DL (ref 8.6–10.2)
CALCIUM SERPL-MCNC: 8.8 MG/DL (ref 8.6–10.2)
CALCIUM SERPL-MCNC: 8.9 MG/DL (ref 8.6–10.2)
CALCIUM SERPL-MCNC: 9.1 MG/DL (ref 8.6–10.2)
CANCER AG125 SERPL-ACNC: 37 U/ML (ref 0–35)
CEA SERPL-MCNC: 5 NG/ML (ref 0–5.2)
CHLORIDE SERPL-SCNC: 87 MMOL/L (ref 98–107)
CHLORIDE SERPL-SCNC: 89 MMOL/L (ref 98–107)
CHOLEST SERPL-MCNC: 154 MG/DL
CLOT CHECK: ABNORMAL
CO2 SERPL-SCNC: 25 MMOL/L (ref 22–29)
CO2 SERPL-SCNC: 25 MMOL/L (ref 22–29)
CO2 SERPL-SCNC: 26 MMOL/L (ref 22–29)
CO2 SERPL-SCNC: 27 MMOL/L (ref 22–29)
COLOR FLD: ABNORMAL
CREAT SERPL-MCNC: 0.7 MG/DL (ref 0.7–1.2)
CREAT SERPL-MCNC: 0.8 MG/DL (ref 0.7–1.2)
CRITICAL: NORMAL
CRITICAL: NORMAL
DATE ANALYZED: NORMAL
DATE ANALYZED: NORMAL
DATE OF COLLECTION: NORMAL
DATE OF COLLECTION: NORMAL
ECHO AV AREA PEAK VELOCITY: 2.9 CM2
ECHO AV AREA VTI: 3.4 CM2
ECHO AV AREA/BSA PEAK VELOCITY: 1.4 CM2/M2
ECHO AV AREA/BSA VTI: 1.6 CM2/M2
ECHO AV CUSP MM: 2.2 CM
ECHO AV MEAN GRADIENT: 3 MMHG
ECHO AV MEAN VELOCITY: 0.8 M/S
ECHO AV PEAK GRADIENT: 5 MMHG
ECHO AV PEAK VELOCITY: 1.1 M/S
ECHO AV VELOCITY RATIO: 0.91
ECHO AV VTI: 19.5 CM
ECHO BSA: 2.13 M2
ECHO LA DIAMETER INDEX: 1.76 CM/M2
ECHO LA DIAMETER: 3.7 CM
ECHO LA VOL A-L A4C: 56 ML (ref 18–58)
ECHO LA VOL MOD A4C: 51 ML (ref 18–58)
ECHO LA VOLUME INDEX A-L A4C: 27 ML/M2 (ref 16–34)
ECHO LA VOLUME INDEX MOD A4C: 24 ML/M2 (ref 16–34)
ECHO LV FRACTIONAL SHORTENING: 37 % (ref 28–44)
ECHO LV INTERNAL DIMENSION DIASTOLE INDEX: 1.67 CM/M2
ECHO LV INTERNAL DIMENSION DIASTOLIC: 3.5 CM (ref 4.2–5.9)
ECHO LV INTERNAL DIMENSION SYSTOLIC INDEX: 1.05 CM/M2
ECHO LV INTERNAL DIMENSION SYSTOLIC: 2.2 CM
ECHO LV ISOVOLUMETRIC RELAXATION TIME (IVRT): 117.7 MS
ECHO LV IVSD: 1.2 CM (ref 0.6–1)
ECHO LV MASS 2D: 119 G (ref 88–224)
ECHO LV MASS INDEX 2D: 56.7 G/M2 (ref 49–115)
ECHO LV POSTERIOR WALL DIASTOLIC: 1 CM (ref 0.6–1)
ECHO LV RELATIVE WALL THICKNESS RATIO: 0.57
ECHO LVOT AREA: 3.1 CM2
ECHO LVOT AV VTI INDEX: 1.09
ECHO LVOT DIAM: 2 CM
ECHO LVOT MEAN GRADIENT: 2 MMHG
ECHO LVOT PEAK GRADIENT: 4 MMHG
ECHO LVOT PEAK VELOCITY: 1 M/S
ECHO LVOT STROKE VOLUME INDEX: 31.7 ML/M2
ECHO LVOT SV: 66.6 ML
ECHO LVOT VTI: 21.2 CM
ECHO MV "A" WAVE DURATION: 126.3 MSEC
ECHO MV A VELOCITY: 0.73 M/S
ECHO MV AREA PHT: 3.9 CM2
ECHO MV AREA VTI: 3.9 CM2
ECHO MV E DECELERATION TIME (DT): 250.7 MS
ECHO MV E VELOCITY: 0.59 M/S
ECHO MV E/A RATIO: 0.81
ECHO MV LVOT VTI INDEX: 0.8
ECHO MV MAX VELOCITY: 0.9 M/S
ECHO MV MEAN GRADIENT: 2 MMHG
ECHO MV MEAN VELOCITY: 0.7 M/S
ECHO MV PEAK GRADIENT: 3 MMHG
ECHO MV PRESSURE HALF TIME (PHT): 56.4 MS
ECHO MV VTI: 16.9 CM
ECHO PV MAX VELOCITY: 1 M/S
ECHO PV MEAN GRADIENT: 2 MMHG
ECHO PV MEAN VELOCITY: 0.7 M/S
ECHO PV PEAK GRADIENT: 4 MMHG
ECHO PV VTI: 17.8 CM
ECHO RV INTERNAL DIMENSION: 2.4 CM
EOSINOPHIL # BLD: 0 K/UL (ref 0.05–0.5)
EOSINOPHIL # BLD: 0.02 K/UL (ref 0.05–0.5)
EOSINOPHILS RELATIVE PERCENT: 0 % (ref 0–6)
EOSINOPHILS RELATIVE PERCENT: 0 % (ref 0–6)
ERYTHROCYTE [DISTWIDTH] IN BLOOD BY AUTOMATED COUNT: 11.8 % (ref 11.5–15)
ERYTHROCYTE [DISTWIDTH] IN BLOOD BY AUTOMATED COUNT: 11.9 % (ref 11.5–15)
FLUAV RNA NPH QL NAA+NON-PROBE: NOT DETECTED
FLUBV RNA NPH QL NAA+NON-PROBE: NOT DETECTED
GFR SERPL CREATININE-BSD FRML MDRD: >60 ML/MIN/1.73M2
GLUCOSE FLD-MCNC: 79 MG/DL
GLUCOSE SERPL-MCNC: 144 MG/DL (ref 74–99)
GLUCOSE SERPL-MCNC: 151 MG/DL (ref 74–99)
GLUCOSE SERPL-MCNC: 154 MG/DL (ref 74–99)
GLUCOSE SERPL-MCNC: 213 MG/DL (ref 74–99)
HADV DNA NPH QL NAA+NON-PROBE: NOT DETECTED
HCOV 229E RNA NPH QL NAA+NON-PROBE: NOT DETECTED
HCOV HKU1 RNA NPH QL NAA+NON-PROBE: NOT DETECTED
HCOV NL63 RNA NPH QL NAA+NON-PROBE: NOT DETECTED
HCOV OC43 RNA NPH QL NAA+NON-PROBE: NOT DETECTED
HCT VFR BLD AUTO: 33 % (ref 37–54)
HCT VFR BLD AUTO: 33.4 % (ref 37–54)
HDLC SERPL-MCNC: 74 MG/DL
HGB BLD-MCNC: 11.8 G/DL (ref 12.5–16.5)
HGB BLD-MCNC: 12.3 G/DL (ref 12.5–16.5)
HMPV RNA NPH QL NAA+NON-PROBE: NOT DETECTED
HPIV1 RNA NPH QL NAA+NON-PROBE: NOT DETECTED
HPIV2 RNA NPH QL NAA+NON-PROBE: NOT DETECTED
HPIV3 RNA NPH QL NAA+NON-PROBE: NOT DETECTED
HPIV4 RNA NPH QL NAA+NON-PROBE: NOT DETECTED
IMM GRANULOCYTES # BLD AUTO: 0.03 K/UL (ref 0–0.58)
IMM GRANULOCYTES # BLD AUTO: 0.04 K/UL (ref 0–0.58)
IMM GRANULOCYTES NFR BLD: 1 % (ref 0–5)
IMM GRANULOCYTES NFR BLD: 1 % (ref 0–5)
INR PPP: 1.3
LAB: NORMAL
LAB: NORMAL
LACTATE BLDV-SCNC: 1.3 MMOL/L (ref 0.5–2.2)
LDH FLD L TO P-CCNC: 1505 U/L
LDLC SERPL CALC-MCNC: 69 MG/DL
LYMPHOCYTES NFR BLD: 0.66 K/UL (ref 1.5–4)
LYMPHOCYTES NFR BLD: 0.68 K/UL (ref 1.5–4)
LYMPHOCYTES RELATIVE PERCENT: 10 % (ref 20–42)
LYMPHOCYTES RELATIVE PERCENT: 11 % (ref 20–42)
Lab: 1324
Lab: 1324
Lab: NORMAL
M PNEUMO DNA NPH QL NAA+NON-PROBE: NOT DETECTED
MAGNESIUM SERPL-MCNC: 1.9 MG/DL (ref 1.6–2.6)
MAGNESIUM SERPL-MCNC: 2 MG/DL (ref 1.6–2.6)
MCH RBC QN AUTO: 32.4 PG (ref 26–35)
MCH RBC QN AUTO: 32.7 PG (ref 26–35)
MCHC RBC AUTO-ENTMCNC: 35.8 G/DL (ref 32–34.5)
MCHC RBC AUTO-ENTMCNC: 36.8 G/DL (ref 32–34.5)
MCV RBC AUTO: 88.8 FL (ref 80–99.9)
MCV RBC AUTO: 90.7 FL (ref 80–99.9)
MONOCYTES NFR BLD: 0.11 K/UL (ref 0.1–0.95)
MONOCYTES NFR BLD: 0.19 K/UL (ref 0.1–0.95)
MONOCYTES NFR BLD: 2 % (ref 2–12)
MONOCYTES NFR BLD: 3 % (ref 2–12)
MONOCYTES NFR FLD: 54 %
NEUTROPHILS NFR BLD: 86 % (ref 43–80)
NEUTROPHILS NFR BLD: 87 % (ref 43–80)
NEUTROPHILS NFR FLD: 46 %
NEUTS SEG NFR BLD: 5.4 K/UL (ref 1.8–7.3)
NEUTS SEG NFR BLD: 5.62 K/UL (ref 1.8–7.3)
OPERATOR ID: NORMAL
OPERATOR ID: NORMAL
OSMOLALITY SERPL: 266 MOSM/KG (ref 285–310)
PH FLUID: 7.36
PH FLUID: 7.36
PHOSPHATE SERPL-MCNC: 3.9 MG/DL (ref 2.5–4.5)
PHOSPHATE SERPL-MCNC: 4.2 MG/DL (ref 2.5–4.5)
PLATELET # BLD AUTO: 221 K/UL (ref 130–450)
PLATELET # BLD AUTO: 234 K/UL (ref 130–450)
PMV BLD AUTO: 8.3 FL (ref 7–12)
PMV BLD AUTO: 8.5 FL (ref 7–12)
POTASSIUM SERPL-SCNC: 3.2 MMOL/L (ref 3.5–5)
POTASSIUM SERPL-SCNC: 3.2 MMOL/L (ref 3.5–5)
POTASSIUM SERPL-SCNC: 3.5 MMOL/L (ref 3.5–5)
POTASSIUM SERPL-SCNC: 3.7 MMOL/L (ref 3.5–5)
PROCALCITONIN SERPL-MCNC: 0.14 NG/ML (ref 0–0.08)
PROT FLD-MCNC: 5.6 G/DL
PROT SERPL-MCNC: 6.2 G/DL (ref 6.4–8.3)
PROT SERPL-MCNC: 6.5 G/DL (ref 6.4–8.3)
PROT SERPL-MCNC: 6.6 G/DL (ref 6.4–8.3)
PROTHROMBIN TIME: 15.1 SEC (ref 9.3–12.4)
PSA SERPL-MCNC: 20.97 NG/ML (ref 0–4)
PTH-INTACT SERPL-MCNC: 50.1 PG/ML (ref 15–65)
RBC # BLD AUTO: 3.64 M/UL (ref 3.8–5.8)
RBC # BLD AUTO: 3.76 M/UL (ref 3.8–5.8)
RBC # FLD: ABNORMAL CELLS/UL
RSV RNA NPH QL NAA+NON-PROBE: DETECTED
RV+EV RNA NPH QL NAA+NON-PROBE: NOT DETECTED
SARS-COV-2 RNA NPH QL NAA+NON-PROBE: NOT DETECTED
SODIUM SERPL-SCNC: 125 MMOL/L (ref 132–146)
SODIUM SERPL-SCNC: 126 MMOL/L (ref 132–146)
SOURCE, BLOOD GAS: NORMAL
SOURCE, BLOOD GAS: NORMAL
SPECIMEN DESCRIPTION: ABNORMAL
SPECIMEN TYPE: NORMAL
TIME ANALYZED: 1340
TIME ANALYZED: 1340
TRIGL SERPL-MCNC: 57 MG/DL
TROPONIN I SERPL HS-MCNC: 16 NG/L (ref 0–11)
TSH SERPL DL<=0.05 MIU/L-ACNC: 1.37 UIU/ML (ref 0.27–4.2)
VLDLC SERPL CALC-MCNC: 11 MG/DL
WBC # FLD: 1618 CELLS/UL
WBC OTHER # BLD: 6.2 K/UL (ref 4.5–11.5)
WBC OTHER # BLD: 6.6 K/UL (ref 4.5–11.5)

## 2023-12-29 PROCEDURE — 32555 ASPIRATE PLEURA W/ IMAGING: CPT

## 2023-12-29 PROCEDURE — 96374 THER/PROPH/DIAG INJ IV PUSH: CPT

## 2023-12-29 PROCEDURE — 0202U NFCT DS 22 TRGT SARS-COV-2: CPT

## 2023-12-29 PROCEDURE — 2580000003 HC RX 258

## 2023-12-29 PROCEDURE — 85025 COMPLETE CBC W/AUTO DIFF WBC: CPT

## 2023-12-29 PROCEDURE — 87116 MYCOBACTERIA CULTURE: CPT

## 2023-12-29 PROCEDURE — 2580000003 HC RX 258: Performed by: STUDENT IN AN ORGANIZED HEALTH CARE EDUCATION/TRAINING PROGRAM

## 2023-12-29 PROCEDURE — 83970 ASSAY OF PARATHORMONE: CPT

## 2023-12-29 PROCEDURE — A9503 TC99M MEDRONATE: HCPCS | Performed by: RADIOLOGY

## 2023-12-29 PROCEDURE — 82378 CARCINOEMBRYONIC ANTIGEN: CPT

## 2023-12-29 PROCEDURE — 6360000002 HC RX W HCPCS: Performed by: STUDENT IN AN ORGANIZED HEALTH CARE EDUCATION/TRAINING PROGRAM

## 2023-12-29 PROCEDURE — 6360000004 HC RX CONTRAST MEDICATION: Performed by: RADIOLOGY

## 2023-12-29 PROCEDURE — 87040 BLOOD CULTURE FOR BACTERIA: CPT

## 2023-12-29 PROCEDURE — 83930 ASSAY OF BLOOD OSMOLALITY: CPT

## 2023-12-29 PROCEDURE — 51702 INSERT TEMP BLADDER CATH: CPT

## 2023-12-29 PROCEDURE — 6360000002 HC RX W HCPCS

## 2023-12-29 PROCEDURE — 71045 X-RAY EXAM CHEST 1 VIEW: CPT

## 2023-12-29 PROCEDURE — 94640 AIRWAY INHALATION TREATMENT: CPT

## 2023-12-29 PROCEDURE — 99291 CRITICAL CARE FIRST HOUR: CPT | Performed by: INTERNAL MEDICINE

## 2023-12-29 PROCEDURE — 6370000000 HC RX 637 (ALT 250 FOR IP): Performed by: INTERNAL MEDICINE

## 2023-12-29 PROCEDURE — 85610 PROTHROMBIN TIME: CPT

## 2023-12-29 PROCEDURE — 2000000000 HC ICU R&B

## 2023-12-29 PROCEDURE — 83519 RIA NONANTIBODY: CPT

## 2023-12-29 PROCEDURE — 36415 COLL VENOUS BLD VENIPUNCTURE: CPT

## 2023-12-29 PROCEDURE — 84155 ASSAY OF PROTEIN SERUM: CPT

## 2023-12-29 PROCEDURE — 6370000000 HC RX 637 (ALT 250 FOR IP)

## 2023-12-29 PROCEDURE — 89051 BODY FLUID CELL COUNT: CPT

## 2023-12-29 PROCEDURE — 86304 IMMUNOASSAY TUMOR CA 125: CPT

## 2023-12-29 PROCEDURE — 87206 SMEAR FLUORESCENT/ACID STAI: CPT

## 2023-12-29 PROCEDURE — 71275 CT ANGIOGRAPHY CHEST: CPT

## 2023-12-29 PROCEDURE — 84443 ASSAY THYROID STIM HORMONE: CPT

## 2023-12-29 PROCEDURE — 87070 CULTURE OTHR SPECIMN AEROBIC: CPT

## 2023-12-29 PROCEDURE — 87086 URINE CULTURE/COLONY COUNT: CPT

## 2023-12-29 PROCEDURE — 6360000002 HC RX W HCPCS: Performed by: INTERNAL MEDICINE

## 2023-12-29 PROCEDURE — 87102 FUNGUS ISOLATION CULTURE: CPT

## 2023-12-29 PROCEDURE — 82945 GLUCOSE OTHER FLUID: CPT

## 2023-12-29 PROCEDURE — 80048 BASIC METABOLIC PNL TOTAL CA: CPT

## 2023-12-29 PROCEDURE — 88305 TISSUE EXAM BY PATHOLOGIST: CPT

## 2023-12-29 PROCEDURE — 87081 CULTURE SCREEN ONLY: CPT

## 2023-12-29 PROCEDURE — 2500000003 HC RX 250 WO HCPCS

## 2023-12-29 PROCEDURE — 86301 IMMUNOASSAY TUMOR CA 19-9: CPT

## 2023-12-29 PROCEDURE — 87015 SPECIMEN INFECT AGNT CONCNTJ: CPT

## 2023-12-29 PROCEDURE — 93308 TTE F-UP OR LMTD: CPT

## 2023-12-29 PROCEDURE — 3430000000 HC RX DIAGNOSTIC RADIOPHARMACEUTICAL: Performed by: RADIOLOGY

## 2023-12-29 PROCEDURE — 83605 ASSAY OF LACTIC ACID: CPT

## 2023-12-29 PROCEDURE — 80061 LIPID PANEL: CPT

## 2023-12-29 PROCEDURE — 78306 BONE IMAGING WHOLE BODY: CPT

## 2023-12-29 PROCEDURE — 6370000000 HC RX 637 (ALT 250 FOR IP): Performed by: STUDENT IN AN ORGANIZED HEALTH CARE EDUCATION/TRAINING PROGRAM

## 2023-12-29 PROCEDURE — 87205 SMEAR GRAM STAIN: CPT

## 2023-12-29 PROCEDURE — 84145 PROCALCITONIN (PCT): CPT

## 2023-12-29 PROCEDURE — 87449 NOS EACH ORGANISM AG IA: CPT

## 2023-12-29 PROCEDURE — 88112 CYTOPATH CELL ENHANCE TECH: CPT

## 2023-12-29 PROCEDURE — G0103 PSA SCREENING: HCPCS

## 2023-12-29 PROCEDURE — 87899 AGENT NOS ASSAY W/OPTIC: CPT

## 2023-12-29 PROCEDURE — 84484 ASSAY OF TROPONIN QUANT: CPT

## 2023-12-29 PROCEDURE — 93308 TTE F-UP OR LMTD: CPT | Performed by: INTERNAL MEDICINE

## 2023-12-29 PROCEDURE — 83615 LACTATE (LD) (LDH) ENZYME: CPT

## 2023-12-29 PROCEDURE — C1729 CATH, DRAINAGE: HCPCS

## 2023-12-29 PROCEDURE — 83735 ASSAY OF MAGNESIUM: CPT

## 2023-12-29 PROCEDURE — 84157 ASSAY OF PROTEIN OTHER: CPT

## 2023-12-29 PROCEDURE — 80053 COMPREHEN METABOLIC PANEL: CPT

## 2023-12-29 PROCEDURE — 84100 ASSAY OF PHOSPHORUS: CPT

## 2023-12-29 PROCEDURE — 83986 ASSAY PH BODY FLUID NOS: CPT

## 2023-12-29 RX ORDER — AMLODIPINE BESYLATE 10 MG/1
10 TABLET ORAL DAILY
Status: DISCONTINUED | OUTPATIENT
Start: 2023-12-29 | End: 2023-12-31

## 2023-12-29 RX ORDER — SODIUM CHLORIDE 0.9 % (FLUSH) 0.9 %
5-40 SYRINGE (ML) INJECTION PRN
Status: DISCONTINUED | OUTPATIENT
Start: 2023-12-29 | End: 2024-01-09 | Stop reason: SDUPTHER

## 2023-12-29 RX ORDER — SODIUM CHLORIDE 9 MG/ML
INJECTION, SOLUTION INTRAVENOUS CONTINUOUS
Status: DISCONTINUED | OUTPATIENT
Start: 2023-12-29 | End: 2023-12-29

## 2023-12-29 RX ORDER — PANTOPRAZOLE SODIUM 40 MG/1
40 TABLET, DELAYED RELEASE ORAL
Status: DISCONTINUED | OUTPATIENT
Start: 2023-12-29 | End: 2024-01-11 | Stop reason: HOSPADM

## 2023-12-29 RX ORDER — PROPRANOLOL HYDROCHLORIDE 10 MG/1
20 TABLET ORAL 3 TIMES DAILY
Status: DISCONTINUED | OUTPATIENT
Start: 2023-12-29 | End: 2024-01-11 | Stop reason: HOSPADM

## 2023-12-29 RX ORDER — SODIUM CHLORIDE 9 MG/ML
INJECTION, SOLUTION INTRAVENOUS PRN
Status: DISCONTINUED | OUTPATIENT
Start: 2023-12-29 | End: 2024-01-09 | Stop reason: SDUPTHER

## 2023-12-29 RX ORDER — IPRATROPIUM BROMIDE AND ALBUTEROL SULFATE 2.5; .5 MG/3ML; MG/3ML
1 SOLUTION RESPIRATORY (INHALATION)
Status: DISCONTINUED | OUTPATIENT
Start: 2023-12-29 | End: 2024-01-03

## 2023-12-29 RX ORDER — ENOXAPARIN SODIUM 100 MG/ML
40 INJECTION SUBCUTANEOUS DAILY
Status: DISCONTINUED | OUTPATIENT
Start: 2023-12-29 | End: 2024-01-11 | Stop reason: HOSPADM

## 2023-12-29 RX ORDER — TC 99M MEDRONATE 20 MG/10ML
25 INJECTION, POWDER, LYOPHILIZED, FOR SOLUTION INTRAVENOUS
Status: COMPLETED | OUTPATIENT
Start: 2023-12-29 | End: 2023-12-29

## 2023-12-29 RX ORDER — ROSUVASTATIN CALCIUM 20 MG/1
20 TABLET, COATED ORAL DAILY
COMMUNITY

## 2023-12-29 RX ORDER — POTASSIUM CHLORIDE 7.45 MG/ML
10 INJECTION INTRAVENOUS
Status: COMPLETED | OUTPATIENT
Start: 2023-12-29 | End: 2023-12-29

## 2023-12-29 RX ORDER — SODIUM CHLORIDE 0.9 % (FLUSH) 0.9 %
5-40 SYRINGE (ML) INJECTION EVERY 12 HOURS SCHEDULED
Status: DISCONTINUED | OUTPATIENT
Start: 2023-12-29 | End: 2024-01-09 | Stop reason: SDUPTHER

## 2023-12-29 RX ORDER — PROPRANOLOL HYDROCHLORIDE 10 MG/1
20 TABLET ORAL ONCE
Status: DISCONTINUED | OUTPATIENT
Start: 2023-12-29 | End: 2023-12-30

## 2023-12-29 RX ADMIN — IPRATROPIUM BROMIDE AND ALBUTEROL SULFATE 1 DOSE: .5; 2.5 SOLUTION RESPIRATORY (INHALATION) at 17:44

## 2023-12-29 RX ADMIN — POTASSIUM CHLORIDE 10 MEQ: 7.46 INJECTION, SOLUTION INTRAVENOUS at 10:45

## 2023-12-29 RX ADMIN — IPRATROPIUM BROMIDE AND ALBUTEROL SULFATE 1 DOSE: .5; 2.5 SOLUTION RESPIRATORY (INHALATION) at 00:58

## 2023-12-29 RX ADMIN — POTASSIUM CHLORIDE 10 MEQ: 7.46 INJECTION, SOLUTION INTRAVENOUS at 07:32

## 2023-12-29 RX ADMIN — TC 99M MEDRONATE 25 MILLICURIE: 20 INJECTION, POWDER, LYOPHILIZED, FOR SOLUTION INTRAVENOUS at 10:28

## 2023-12-29 RX ADMIN — WATER 40 MG: 1 INJECTION INTRAMUSCULAR; INTRAVENOUS; SUBCUTANEOUS at 16:42

## 2023-12-29 RX ADMIN — IPRATROPIUM BROMIDE AND ALBUTEROL SULFATE 1 DOSE: .5; 2.5 SOLUTION RESPIRATORY (INHALATION) at 09:47

## 2023-12-29 RX ADMIN — IPRATROPIUM BROMIDE AND ALBUTEROL SULFATE 1 DOSE: .5; 2.5 SOLUTION RESPIRATORY (INHALATION) at 04:44

## 2023-12-29 RX ADMIN — SODIUM CHLORIDE: 9 INJECTION, SOLUTION INTRAVENOUS at 07:28

## 2023-12-29 RX ADMIN — WATER 40 MG: 1 INJECTION INTRAMUSCULAR; INTRAVENOUS; SUBCUTANEOUS at 22:20

## 2023-12-29 RX ADMIN — Medication 10 ML: at 20:58

## 2023-12-29 RX ADMIN — POTASSIUM CHLORIDE 10 MEQ: 7.46 INJECTION, SOLUTION INTRAVENOUS at 11:51

## 2023-12-29 RX ADMIN — Medication 10 ML: at 09:52

## 2023-12-29 RX ADMIN — DOXYCYCLINE 100 MG: 100 INJECTION, POWDER, LYOPHILIZED, FOR SOLUTION INTRAVENOUS at 12:03

## 2023-12-29 RX ADMIN — CEFTRIAXONE SODIUM 2000 MG: 2 INJECTION, POWDER, FOR SOLUTION INTRAMUSCULAR; INTRAVENOUS at 00:29

## 2023-12-29 RX ADMIN — IPRATROPIUM BROMIDE AND ALBUTEROL SULFATE 1 DOSE: .5; 2.5 SOLUTION RESPIRATORY (INHALATION) at 21:35

## 2023-12-29 RX ADMIN — PROPRANOLOL HYDROCHLORIDE 20 MG: 10 TABLET ORAL at 20:57

## 2023-12-29 RX ADMIN — WATER 40 MG: 1 INJECTION INTRAMUSCULAR; INTRAVENOUS; SUBCUTANEOUS at 05:15

## 2023-12-29 RX ADMIN — PANTOPRAZOLE SODIUM 40 MG: 40 TABLET, DELAYED RELEASE ORAL at 09:47

## 2023-12-29 RX ADMIN — POTASSIUM CHLORIDE 10 MEQ: 7.46 INJECTION, SOLUTION INTRAVENOUS at 09:48

## 2023-12-29 RX ADMIN — ENOXAPARIN SODIUM 40 MG: 100 INJECTION SUBCUTANEOUS at 14:12

## 2023-12-29 RX ADMIN — PROPRANOLOL HYDROCHLORIDE 20 MG: 10 TABLET ORAL at 14:17

## 2023-12-29 RX ADMIN — DOXYCYCLINE HYCLATE 100 MG: 100 CAPSULE ORAL at 00:33

## 2023-12-29 RX ADMIN — POTASSIUM CHLORIDE 40 MEQ: 1500 TABLET, EXTENDED RELEASE ORAL at 00:33

## 2023-12-29 RX ADMIN — WATER 40 MG: 1 INJECTION INTRAMUSCULAR; INTRAVENOUS; SUBCUTANEOUS at 09:47

## 2023-12-29 RX ADMIN — IOPAMIDOL 80 ML: 755 INJECTION, SOLUTION INTRAVENOUS at 02:23

## 2023-12-29 ASSESSMENT — PAIN - FUNCTIONAL ASSESSMENT
PAIN_FUNCTIONAL_ASSESSMENT: NONE - DENIES PAIN

## 2023-12-29 ASSESSMENT — LIFESTYLE VARIABLES
HOW MANY STANDARD DRINKS CONTAINING ALCOHOL DO YOU HAVE ON A TYPICAL DAY: PATIENT DOES NOT DRINK
HOW OFTEN DO YOU HAVE A DRINK CONTAINING ALCOHOL: NEVER

## 2023-12-29 ASSESSMENT — PAIN SCALES - GENERAL
PAINLEVEL_OUTOF10: 0
PAINLEVEL_OUTOF10: 0

## 2023-12-29 NOTE — CONSULTS
changes, eye pain, tinnitus, nosebleeds, hoarseness or throat pain    Respiratory:denies chest pain, +dyspnea, cough and hemoptysis,no sputum production, shortness of breath and wheezing   Cardiovascular: denies orthopnea, paroxysmal nocturnal dyspnea,+ leg swelling, and previous heart attack.    Gastrointestinal: denies pain, nausea vomiting, diarrhea, constipation, melena or bleeding.  Genitourinary: denies hematuria, frequency, urgency or dysuria  Neurology: denies syncope, seizures, paralysis, paraesthesia   Endocrine: denies polyuria, polydipsia, skin or hair changes, and heat or cold intolerance  Musculoskeletal: denies joint pain, swelling, arthritis or myalgia  Hematologic: denies bleeding, adenopathy and easy bruising  Skin: denies rashes and skin discoloration  Psychiatry: denies depression    Physical Exam:   Vital Signs:  /77   Pulse (!) 108   Temp 98.7 °F (37.1 °C) (Infrared)   Resp 22   Wt 93 kg (205 lb)   SpO2 96%   BMI 28.59 kg/m²     Input/Output:  No intake/output data recorded.    Oxygen requirements: Room air       General appearance: Well developed, not in pain or distress,mild respiratory distress when speaking in sentences    HEENT: Atraumatic/normocephalic, EOMI, DIANE, pharynx clear, moist mucosa, redness of the uvula appreciated,   Neck: Supple, no jugular venous distension, lymphadenopathy, thyromegaly or carotid bruits  Chest:  wheezing on right with little air movement on left, room air. no crackles and no tenderness over ribs   Cardiovascular: Normal S1 , S2, regular rate and rhythm, no murmur, rub or gallop  Abdomen: Normal sounds present, soft, lax with no tenderness, no hepatosplenomegaly, and no masses  Extremities: +edema eddie lower legs. Pulses are equally present.   Skin: intact, no rashes   Neurologic: Alert and oriented x 3, No focal deficit     Investigations:  Labs, radiological imaging and cardiac work up were reviewed        ICU STAFF PHYSICIAN NOTE OF PERSONAL  INVOLVEMENT IN CARE  As the attending physician, I certify that I personally reviewed the patient's history and personally examined the patient to confirm the physical findings described above, and that I reviewed the relevant imaging studies and available reports.  I also discussed the differential diagnosis and all of the proposed management plans with the patient and individuals accompanying the patient to this visit.  They had the opportunity to ask questions about the proposed management plans and to have those questions answered.    This patient has a high probability of sudden, clinically significant deterioration, which requires the highest level of physician preparedness to intervene urgently.  I managed/supervised life or organ supporting interventions that required frequent physician assessment.  I devoted my full attention to the direct care of this patient for the amount of time indicated below.  Time I spent with family or surrogate(s) is included only if the patient was incapable of providing the necessary information or participating in medical decision-making.  Time devoted to teaching and to any procedures I billed separately is not included.  Critical Care Time: 35 minutes      Electronically signed by MERLENE Madsen CNP on 12/28/2023 at 10:43 PM

## 2023-12-29 NOTE — ACP (ADVANCE CARE PLANNING)
Advance Care Planning     Advance Care Planning Activator (Inpatient)  Conversation Note      Date of ACP Conversation: 12/29/2023     Conversation Conducted with: Patient with Decision Making Capacity    ACP Activator: ELSI Clark      Health Care Decision Maker:     Current Designated Health Care Decision Maker:     Primary Decision Maker: Hallie Fuentes - [facility] - [phone]  Click here to complete Healthcare Decision Makers including section of the Healthcare Decision Maker Relationship (ie \"Primary\")  Today we documented Decision Maker(s) consistent with Legal Next of Kin hierarchy.    Care Preferences    Ventilation:  \"If you were in your present state of health and suddenly became very ill and were unable to breathe on your own, what would your preference be about the use of a ventilator (breathing machine) if it were available to you?\"      Would the patient desire the use of ventilator (breathing machine)?: yes    \"If your health worsens and it becomes clear that your chance of recovery is unlikely, what would your preference be about the use of a ventilator (breathing machine) if it were available to you?\"     Would the patient desire the use of ventilator (breathing machine)?: Yes      Resuscitation  \"CPR works best to restart the heart when there is a sudden event, like a heart attack, in someone who is otherwise healthy. Unfortunately, CPR does not typically restart the heart for people who have serious health conditions or who are very sick.\"    \"In the event your heart stopped as a result of an underlying serious health condition, would you want attempts to be made to restart your heart (answer \"yes\" for attempt to resuscitate) or would you prefer a natural death (answer \"no\" for do not attempt to resuscitate)?\" yes       [] Yes   [x] No   Educated Patient / Decision Maker regarding differences between Advance Directives and portable DNR orders.    Length of ACP Conversation in minutes:

## 2023-12-29 NOTE — ED PROVIDER NOTES
HPI   This is an 78-year-old male patient here for evaluation of increasing shortness of breath, cough.  Symptoms ongoing for about 2 to 3 weeks.  He states he was at [facility] and had an x-ray which showed a pleural effusion.  He denies any chest pain.  He does note associated increasing leg swelling over the last couple of weeks.  He is not on any anticoagulation.  No nausea or vomiting.  No fevers he states he has been having associated clear sputum production with cough.  Review of Systems   Constitutional:  Negative for chills and fever.   HENT:  Negative for ear pain, sinus pressure and sore throat.    Eyes:  Negative for pain, discharge and redness.   Respiratory:  Positive for cough, shortness of breath and wheezing.    Cardiovascular:  Negative for chest pain.   Gastrointestinal:  Negative for abdominal pain, diarrhea, nausea and vomiting.   Genitourinary:  Negative for dysuria and frequency.   Musculoskeletal:  Negative for arthralgias and back pain.   Skin:  Negative for rash and wound.   Neurological:  Negative for weakness and headaches.   Hematological:  Negative for adenopathy.   All other systems reviewed and are negative.       Physical Exam  Vitals and nursing note reviewed.   Constitutional:       Appearance: He is well-developed.   HENT:      Head: Normocephalic and atraumatic.      Mouth/Throat:      Pharynx: Oropharynx is clear.   Eyes:      Conjunctiva/sclera: Conjunctivae normal.   Cardiovascular:      Rate and Rhythm: Normal rate and regular rhythm.      Heart sounds: Normal heart sounds. No murmur heard.  Pulmonary:      Effort: Pulmonary effort is normal.      Comments: Mild expiratory wheezing with some crackles noted.  Bilaterally.  Abdominal:      General: Bowel sounds are normal.      Palpations: Abdomen is soft.      Tenderness: There is no abdominal tenderness. There is no guarding or rebound.   Musculoskeletal:         General: No tenderness or deformity.      Cervical back:

## 2023-12-29 NOTE — PLAN OF CARE
Problem: Skin/Tissue Integrity  Goal: Absence of new skin breakdown  Description: 1.  Monitor for areas of redness and/or skin breakdown  2.  Assess vascular access sites hourly  3.  Every 4-6 hours minimum:  Change oxygen saturation probe site  4.  Every 4-6 hours:  If on nasal continuous positive airway pressure, respiratory therapy assess nares and determine need for appliance change or resting period.  Outcome: Progressing     Problem: Respiratory - Adult  Goal: Achieves optimal ventilation and oxygenation  Outcome: Progressing     Problem: Metabolic/Fluid and Electrolytes - Adult  Goal: Electrolytes maintained within normal limits  Outcome: Progressing

## 2023-12-29 NOTE — PROGRESS NOTES
4 Eyes Skin Assessment     NAME:  Wilmer Fuentes  YOB: 1945  MEDICAL RECORD NUMBER:  [id number]    The patient is being assessed for  Admission    I agree that at least one RN has performed a thorough Head to Toe Skin Assessment on the patient. ALL assessment sites listed below have been assessed.      Areas assessed by both nurses:    Head, Face, Ears, Shoulders, Back, Chest, Arms, Elbows, Hands, Sacrum. Buttock, Coccyx, Ischium, Legs. Feet and Heels, Under Medical Devices , and Other redness to buttocks/ boggy heels        Does the Patient have a Wound? No noted wound(s)       Emmanuel Prevention initiated by RN: Yes  Wound Care Orders initiated by RN: No    Pressure Injury (Stage 3,4, Unstageable, DTI, NWPT, and Complex wounds) if present, place Wound referral order by RN under : No    New Ostomies, if present place, Ostomy referral order under : No     Nurse 1 eSignature: Electronically signed by Bladimir Ospina RN on 12/29/23 at 7:08 AM EST    **SHARE this note so that the co-signing nurse can place an eSignature**    Nurse 2 eSignature: Electronically signed by Elver Rdz RN on 12/29/23 at 7:29 AM EST

## 2023-12-29 NOTE — CONSULTS
[facility] Nephrology Consult Note  Patient's Name: Wilmer Fuentes  11:16 AM  12/29/2023    Nephrologist: N/A    Reason for Consult:  hyponatremia  Requesting Physician:  Dr. Pollock    Chief Complaint:  worsening SOB and cough    History Obtained From:  patient, chart    History of Present Ilness:    Wilmer Fuentes is a 78 y.o. male with past medical history of hypertension and hyperlipidemia who presented to the emergency department last evening with worsening cough and shortness of breath.  The symptoms have been going on for 2 to 3 weeks.  Patient stated to the ER physician that he was at [facility] and had an x-ray which showed a pleural effusion, he then came to the emergency room.    Initial labs showed a serum sodium of 120 mmol/L at 8 PM last evening.  Nephrology was consulted for hyponatremia.    Initial medical management included IV Solu-Medrol for the breathing issues, 50 mL an hour of normal saline which had run for about 2 hours this morning, and that was it as the ER spoke with me last night and I recommended a strategy of fluid restriction and ordered labs including serum, urine osmolarity and urine sodium.    Of concern was a CT angiogram and chest x-ray, his CT angiogram showed a large left pleural effusion causing collapse of the left lung.    I saw the patient in the intensive care unit this morning.  He said the cough and trouble breathing and been going on for a couple of weeks.  He also noticed some increased leg swelling.  Endorse some loss of appetite, however denied weight loss.  No falls, no unsteady gait though he does ambulate with a cane at baseline.  No fevers or chills.  No dizziness, headache, lightheadedness, mental status changes.  He was hemodynamically stable with normal blood pressures.  Patient overall is euvolemic.    In terms of medication use, he says he is on lisinopril/hydrochlorothiazide for blood pressure.  He has endorsed NSAID use for mild pain in the past though he does

## 2023-12-29 NOTE — PROGRESS NOTES
4 Eyes Skin Assessment     NAME:  Wilmer Fuentes  YOB: 1945  MEDICAL RECORD NUMBER:  [id number]    The patient is being assessed for  Admission    I agree that at least one RN has performed a thorough Head to Toe Skin Assessment on the patient. ALL assessment sites listed below have been assessed.      Areas assessed by both nurses:    Head, Face, Ears, Shoulders, Back, Chest, Arms, Elbows, Hands, Sacrum. Buttock, Coccyx, Ischium, Legs. Feet and Heels, Under Medical Devices , and Other redness to buttocks/ boggy heels        Does the Patient have a Wound? No noted wound(s)       Emmanuel Prevention initiated by RN: Yes  Wound Care Orders initiated by RN: No    Pressure Injury (Stage 3,4, Unstageable, DTI, NWPT, and Complex wounds) if present, place Wound referral order by RN under : No    New Ostomies, if present place, Ostomy referral order under : No     Nurse 1 eSignature: Electronically signed by Bladimir Ospina RN on 12/29/23 at 7:08 AM EST    **SHARE this note so that the co-signing nurse can place an eSignature**    Nurse 2 eSignature: Electronically signed by Elver Rdz RN on 12/29/23 at 7:30 AM EST

## 2023-12-29 NOTE — PROGRESS NOTES
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  He lives at home by himself and has a daughter who is a nurse at the cancer center.    He will require a thoracentesis that was just done (exudative) with a P is a of over 20.  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  Because the patient had a CTA of the chest earlier today, he cannot have another CT with dye so the CT of abdomen and pelvis will be done tomorrow.    MEDICATIONS:   ipratropium 0.5 mg-albuterol 2.5 mg  1 Dose Inhalation Q4H RT    [START ON 12/30/2023] cefTRIAXone (ROCEPHIN) IV  2,000 mg IntraVENous Q24H    doxycycline (VIBRAMYCIN) IV  100 mg IntraVENous Q12H    methylPREDNISolone  40 mg IntraVENous Q6H    amLODIPine  10 mg Oral Daily    propranolol  20 mg Oral TID    sodium chloride flush  5-40 mL IntraVENous 2 times per day    propranolol  20 mg Oral Once    pantoprazole  40 mg Oral QAM AC    enoxaparin  40 mg SubCUTAneous Daily      sodium chloride       perflutren lipid microspheres, sodium chloride flush, sodium chloride      REVIEW OF SYSTEMS:  Constitutional: Denies fever, weight loss, night sweats, and fatigue  Skin: Denies pigmentation, dark lesions, and rashes   HEENT: Denies hearing loss, tinnitus, ear drainage, epistaxis, sore throat, and hoarseness.  Cardiovascular: Denies palpitations, chest pain, and chest pressure.  Respiratory: Denies cough, dyspnea at rest, hemoptysis, apnea, and choking.  Gastrointestinal: Denies nausea, vomiting, poor appetite, diarrhea, heartburn or reflux  Genitourinary: Denies dysuria, frequency, urgency or

## 2023-12-29 NOTE — CARE COORDINATION
Case Management Assessment  Initial Evaluation    Date/Time of Evaluation: 12/29/2023 9:47 AM  Assessment Completed by: ELSI Clark    If patient is discharged prior to next notation, then this note serves as note for discharge by case management.    Patient Name: Wilmer Fuentes                   YOB: 1945  Diagnosis: Hyponatremia [E87.1]  Pleural effusion [J90]                   Date / Time: 12/28/2023  8:08 PM    Patient Admission Status: Inpatient   Readmission Risk (Low < 19, Mod (19-27), High > 27): Readmission Risk Score: 10.2    Current PCP: Todd Cross, DO  PCP verified by CM? Yes    Chart Reviewed: Yes      History Provided by: Patient, Medical Record  Patient Orientation: Alert and Oriented, Person, Place, Situation, Self    Patient Cognition: Alert    Hospitalization in the last 30 days (Readmission):  No    If yes, Readmission Assessment in CM Navigator will be completed.    Advance Directives:      Code Status: Not on file   Patient's Primary Decision Maker is: Legal Next of Kin    Primary Decision Maker: Hallie Fuentes - Spouse - [phone]    Discharge Planning:    Patient lives with: Spouse/Significant Other Type of Home: House  Primary Care Giver: Self  Patient Support Systems include: Spouse/Significant Other   Current Financial resources:    Current community resources: None  Current services prior to admission: None            Current DME:              Type of Home Care services:  None    ADLS  Prior functional level: Independent in ADLs/IADLs  Current functional level: Assistance with the following:, Other (see comment), Housework, Cooking, Shopping (to be assessed)    PT AM-PAC:   /24  OT AM-PAC:   /24    Family can provide assistance at DC: Yes  Would you like Case Management to discuss the discharge plan with any other family members/significant others, and if so, who? Yes (spouse- Hallie)  Plans to Return to Present Housing: Unknown at present  Other Identified

## 2023-12-29 NOTE — H&P
[facility]    Jonnathan Pollock D.O., CICI Hutchins D.O., CICI Arellano D.O.    Joan Rai, MSN, APRN, NP-C  Chavez Harmon, MSN, APRN-CNP  Reid Oliveros, MSN, APRN-CNP     Department of Internal Medicine  History and Physical    PCP: Dr. Todd Cross  Admitting Physician: Dr. Dr. Phill Hutchins  Consultants: Dr. Benjamin      CHIEF COMPLAINT: Hyponatremia and dyspnea    HISTORY OF PRESENT ILLNESS:    78-year-old white male was admitted to the hospital on December 28, 2023.  The patient presented to the hospital here with increasing amount of shortness of breath and difficulty breathing.  It was noted that time that he did have a nonproductive cough.  Chest x-ray and CT did show a large pleural effusion.  Evidence of hyponatremia was noted with sodium being 120.  Patient was admitted to the hospital to the intensive care unit at this time  The patient was resting comfortably the patient did admit to not feeling well for the past several days.  Molecular film array came back positive for RSV.  His sodium has improved since last evening but he has been complaining of increasing amount of fatigue shortness of breath along with signs of peripheral edema.  The patient does have a nonproductive cough.  He currently denies any smoking history  He does complain of generalized fatigue and bone pain predominantly in the chest area and CT showed no evidence of fracture.  It was noted that his alk phos was markedly elevated  Cardiac wise she has no complaints of cardiac symptomatology.  There is a history of hypertension.  Gastrointestinal wise denies any recent change in bowel habitus nausea vomiting diarrhea constipation he did admit to some weight gain which is probably due to fluid.  General generalized he does admit to having nocturia.  Neurological he denies any history of stroke TIAs etc.    PAST MEDICAL Hx:  Past Medical

## 2023-12-29 NOTE — PLAN OF CARE
Problem: Discharge Planning  Goal: Discharge to home or other facility with appropriate resources  Outcome: Not Progressing     Problem: Metabolic/Fluid and Electrolytes - Adult  Goal: Electrolytes maintained within normal limits  12/29/2023 0931 by Logan Vitale RN  Outcome: Not Progressing     Problem: Skin/Tissue Integrity  Goal: Absence of new skin breakdown  Description: 1.  Monitor for areas of redness and/or skin breakdown  2.  Assess vascular access sites hourly  3.  Every 4-6 hours minimum:  Change oxygen saturation probe site  4.  Every 4-6 hours:  If on nasal continuous positive airway pressure, respiratory therapy assess nares and determine need for appliance change or resting period.  12/29/2023 0931 by Logan Vitale, RN  Outcome: Progressing     Problem: Respiratory - Adult  Goal: Achieves optimal ventilation and oxygenation  12/29/2023 0931 by Logan Vitale RN  Outcome: Progressing     Problem: Pain  Goal: Verbalizes/displays adequate comfort level or baseline comfort level  Outcome: Progressing     Problem: Safety - Adult  Goal: Free from fall injury  Outcome: Progressing     Problem: Discharge Planning  Goal: Discharge to home or other facility with appropriate resources  Outcome: Not Progressing     Problem: Metabolic/Fluid and Electrolytes - Adult  Goal: Electrolytes maintained within normal limits  12/29/2023 0931 by Logan Vitale, RN  Outcome: Not Progressing

## 2023-12-29 NOTE — PROGRESS NOTES
Patient came down to Special Procedures for ultrasound guided left thoracentesis.    Procedure was explained, questions were answered.    1323   Starting procedure /79  106  25  95% on room air     1332   Ending procedure /80  99  27  94% on room air     1000 cc of hazy dark yellow color pleural fluid drained from patient, petrolatum dressing folded 4 x 4 and tegaderm applied to left back.     Patients DSD dressing can be removed in 24 hours    Patient tolerated procedure    Post procedure chest xray taken    Respiratory came took specimen for PH    Specimen sent to lab with labels.    Nurse to nurse called spoke with Naomi RN, nurse notified of above information    Patient transported back to floor.

## 2023-12-30 ENCOUNTER — APPOINTMENT (OUTPATIENT)
Dept: GENERAL RADIOLOGY | Age: 78
DRG: 180 | End: 2023-12-30
Payer: MEDICARE

## 2023-12-30 ENCOUNTER — APPOINTMENT (OUTPATIENT)
Dept: CT IMAGING | Age: 78
DRG: 180 | End: 2023-12-30
Payer: MEDICARE

## 2023-12-30 PROBLEM — C79.9 METASTATIC CANCER (HCC): Status: ACTIVE | Noted: 2023-12-30

## 2023-12-30 LAB
25(OH)D3 SERPL-MCNC: 26.1 NG/ML (ref 30–100)
ALBUMIN SERPL-MCNC: 3.2 G/DL (ref 3.5–5.2)
ALP SERPL-CCNC: 378 U/L (ref 40–129)
ALT SERPL-CCNC: 12 U/L (ref 0–40)
ANION GAP SERPL CALCULATED.3IONS-SCNC: 11 MMOL/L (ref 7–16)
ANION GAP SERPL CALCULATED.3IONS-SCNC: 9 MMOL/L (ref 7–16)
ANION GAP SERPL CALCULATED.3IONS-SCNC: 9 MMOL/L (ref 7–16)
AST SERPL-CCNC: 17 U/L (ref 0–39)
BASOPHILS # BLD: 0 K/UL (ref 0–0.2)
BASOPHILS NFR BLD: 0 % (ref 0–2)
BILIRUB SERPL-MCNC: 0.2 MG/DL (ref 0–1.2)
BUN SERPL-MCNC: 14 MG/DL (ref 6–23)
BUN SERPL-MCNC: 15 MG/DL (ref 6–23)
BUN SERPL-MCNC: 17 MG/DL (ref 6–23)
CALCIUM SERPL-MCNC: 8.6 MG/DL (ref 8.6–10.2)
CALCIUM SERPL-MCNC: 8.9 MG/DL (ref 8.6–10.2)
CALCIUM SERPL-MCNC: 8.9 MG/DL (ref 8.6–10.2)
CHLORIDE SERPL-SCNC: 91 MMOL/L (ref 98–107)
CHLORIDE SERPL-SCNC: 92 MMOL/L (ref 98–107)
CHLORIDE SERPL-SCNC: 93 MMOL/L (ref 98–107)
CO2 SERPL-SCNC: 25 MMOL/L (ref 22–29)
CO2 SERPL-SCNC: 26 MMOL/L (ref 22–29)
CO2 SERPL-SCNC: 26 MMOL/L (ref 22–29)
CREAT SERPL-MCNC: 0.7 MG/DL (ref 0.7–1.2)
CREAT SERPL-MCNC: 0.7 MG/DL (ref 0.7–1.2)
CREAT SERPL-MCNC: 0.8 MG/DL (ref 0.7–1.2)
EOSINOPHIL # BLD: 0 K/UL (ref 0.05–0.5)
EOSINOPHILS RELATIVE PERCENT: 0 % (ref 0–6)
ERYTHROCYTE [DISTWIDTH] IN BLOOD BY AUTOMATED COUNT: 11.9 % (ref 11.5–15)
GFR SERPL CREATININE-BSD FRML MDRD: >60 ML/MIN/1.73M2
GLUCOSE SERPL-MCNC: 146 MG/DL (ref 74–99)
GLUCOSE SERPL-MCNC: 152 MG/DL (ref 74–99)
GLUCOSE SERPL-MCNC: 180 MG/DL (ref 74–99)
HCT VFR BLD AUTO: 36.6 % (ref 37–54)
HGB BLD-MCNC: 13 G/DL (ref 12.5–16.5)
L PNEUMO1 AG UR QL IA.RAPID: NEGATIVE
LYMPHOCYTES NFR BLD: 0.71 K/UL (ref 1.5–4)
LYMPHOCYTES RELATIVE PERCENT: 3 % (ref 20–42)
MAGNESIUM SERPL-MCNC: 2.2 MG/DL (ref 1.6–2.6)
MCH RBC QN AUTO: 31.7 PG (ref 26–35)
MCHC RBC AUTO-ENTMCNC: 35.5 G/DL (ref 32–34.5)
MCV RBC AUTO: 89.3 FL (ref 80–99.9)
MICROORGANISM SPEC CULT: NO GROWTH
MICROORGANISM SPEC CULT: NORMAL
MONOCYTES NFR BLD: 1.19 K/UL (ref 0.1–0.95)
MONOCYTES NFR BLD: 5 % (ref 2–12)
NEUTROPHILS NFR BLD: 92 % (ref 43–80)
NEUTS SEG NFR BLD: 21.8 K/UL (ref 1.8–7.3)
PHOSPHATE SERPL-MCNC: 3.2 MG/DL (ref 2.5–4.5)
PLATELET # BLD AUTO: 227 K/UL (ref 130–450)
PMV BLD AUTO: 8.4 FL (ref 7–12)
POTASSIUM SERPL-SCNC: 3.7 MMOL/L (ref 3.5–5)
POTASSIUM SERPL-SCNC: 3.8 MMOL/L (ref 3.5–5)
POTASSIUM SERPL-SCNC: 3.8 MMOL/L (ref 3.5–5)
PROT SERPL-MCNC: 6.2 G/DL (ref 6.4–8.3)
RBC # BLD AUTO: 4.1 M/UL (ref 3.8–5.8)
S PNEUM AG SPEC QL: NEGATIVE
SODIUM SERPL-SCNC: 127 MMOL/L (ref 132–146)
SODIUM SERPL-SCNC: 127 MMOL/L (ref 132–146)
SODIUM SERPL-SCNC: 128 MMOL/L (ref 132–146)
SPECIMEN DESCRIPTION: NORMAL
SPECIMEN DESCRIPTION: NORMAL
SPECIMEN SOURCE: NORMAL
TROPONIN I SERPL HS-MCNC: 15 NG/L (ref 0–11)
WBC OTHER # BLD: 23.7 K/UL (ref 4.5–11.5)

## 2023-12-30 PROCEDURE — 6360000002 HC RX W HCPCS: Performed by: INTERNAL MEDICINE

## 2023-12-30 PROCEDURE — 6370000000 HC RX 637 (ALT 250 FOR IP)

## 2023-12-30 PROCEDURE — 2580000003 HC RX 258

## 2023-12-30 PROCEDURE — 84100 ASSAY OF PHOSPHORUS: CPT

## 2023-12-30 PROCEDURE — 74177 CT ABD & PELVIS W/CONTRAST: CPT

## 2023-12-30 PROCEDURE — 80053 COMPREHEN METABOLIC PANEL: CPT

## 2023-12-30 PROCEDURE — 82306 VITAMIN D 25 HYDROXY: CPT

## 2023-12-30 PROCEDURE — 70450 CT HEAD/BRAIN W/O DYE: CPT

## 2023-12-30 PROCEDURE — 84484 ASSAY OF TROPONIN QUANT: CPT

## 2023-12-30 PROCEDURE — 83735 ASSAY OF MAGNESIUM: CPT

## 2023-12-30 PROCEDURE — 71045 X-RAY EXAM CHEST 1 VIEW: CPT

## 2023-12-30 PROCEDURE — 36415 COLL VENOUS BLD VENIPUNCTURE: CPT

## 2023-12-30 PROCEDURE — 85025 COMPLETE CBC W/AUTO DIFF WBC: CPT

## 2023-12-30 PROCEDURE — 99291 CRITICAL CARE FIRST HOUR: CPT | Performed by: INTERNAL MEDICINE

## 2023-12-30 PROCEDURE — 2000000000 HC ICU R&B

## 2023-12-30 PROCEDURE — 94640 AIRWAY INHALATION TREATMENT: CPT

## 2023-12-30 PROCEDURE — 6370000000 HC RX 637 (ALT 250 FOR IP): Performed by: INTERNAL MEDICINE

## 2023-12-30 PROCEDURE — 6360000002 HC RX W HCPCS

## 2023-12-30 PROCEDURE — 6370000000 HC RX 637 (ALT 250 FOR IP): Performed by: STUDENT IN AN ORGANIZED HEALTH CARE EDUCATION/TRAINING PROGRAM

## 2023-12-30 PROCEDURE — 6360000004 HC RX CONTRAST MEDICATION: Performed by: RADIOLOGY

## 2023-12-30 PROCEDURE — 2500000003 HC RX 250 WO HCPCS

## 2023-12-30 PROCEDURE — 80048 BASIC METABOLIC PNL TOTAL CA: CPT

## 2023-12-30 RX ORDER — POTASSIUM CHLORIDE 20 MEQ/1
40 TABLET, EXTENDED RELEASE ORAL PRN
Status: DISCONTINUED | OUTPATIENT
Start: 2023-12-30 | End: 2024-01-11 | Stop reason: HOSPADM

## 2023-12-30 RX ORDER — MAGNESIUM SULFATE IN WATER 40 MG/ML
2000 INJECTION, SOLUTION INTRAVENOUS PRN
Status: DISCONTINUED | OUTPATIENT
Start: 2023-12-30 | End: 2024-01-11 | Stop reason: HOSPADM

## 2023-12-30 RX ORDER — POTASSIUM CHLORIDE 7.45 MG/ML
10 INJECTION INTRAVENOUS PRN
Status: DISCONTINUED | OUTPATIENT
Start: 2023-12-30 | End: 2024-01-11 | Stop reason: HOSPADM

## 2023-12-30 RX ADMIN — WATER 40 MG: 1 INJECTION INTRAMUSCULAR; INTRAVENOUS; SUBCUTANEOUS at 11:26

## 2023-12-30 RX ADMIN — PROPRANOLOL HYDROCHLORIDE 20 MG: 10 TABLET ORAL at 08:00

## 2023-12-30 RX ADMIN — IPRATROPIUM BROMIDE AND ALBUTEROL SULFATE 1 DOSE: .5; 2.5 SOLUTION RESPIRATORY (INHALATION) at 21:33

## 2023-12-30 RX ADMIN — IPRATROPIUM BROMIDE AND ALBUTEROL SULFATE 1 DOSE: .5; 2.5 SOLUTION RESPIRATORY (INHALATION) at 15:44

## 2023-12-30 RX ADMIN — WATER 40 MG: 1 INJECTION INTRAMUSCULAR; INTRAVENOUS; SUBCUTANEOUS at 05:31

## 2023-12-30 RX ADMIN — CEFTRIAXONE SODIUM 2000 MG: 2 INJECTION, POWDER, FOR SOLUTION INTRAMUSCULAR; INTRAVENOUS at 01:03

## 2023-12-30 RX ADMIN — DOXYCYCLINE 100 MG: 100 INJECTION, POWDER, LYOPHILIZED, FOR SOLUTION INTRAVENOUS at 01:06

## 2023-12-30 RX ADMIN — IPRATROPIUM BROMIDE AND ALBUTEROL SULFATE 1 DOSE: .5; 2.5 SOLUTION RESPIRATORY (INHALATION) at 04:53

## 2023-12-30 RX ADMIN — PROPRANOLOL HYDROCHLORIDE 20 MG: 10 TABLET ORAL at 21:23

## 2023-12-30 RX ADMIN — DOXYCYCLINE 100 MG: 100 INJECTION, POWDER, LYOPHILIZED, FOR SOLUTION INTRAVENOUS at 13:04

## 2023-12-30 RX ADMIN — PANTOPRAZOLE SODIUM 40 MG: 40 TABLET, DELAYED RELEASE ORAL at 05:52

## 2023-12-30 RX ADMIN — Medication 10 ML: at 08:00

## 2023-12-30 RX ADMIN — IPRATROPIUM BROMIDE AND ALBUTEROL SULFATE 1 DOSE: .5; 2.5 SOLUTION RESPIRATORY (INHALATION) at 09:54

## 2023-12-30 RX ADMIN — Medication 15 G: at 17:03

## 2023-12-30 RX ADMIN — Medication 10 ML: at 21:29

## 2023-12-30 RX ADMIN — ENOXAPARIN SODIUM 40 MG: 100 INJECTION SUBCUTANEOUS at 08:01

## 2023-12-30 RX ADMIN — PROPRANOLOL HYDROCHLORIDE 20 MG: 10 TABLET ORAL at 14:42

## 2023-12-30 RX ADMIN — IPRATROPIUM BROMIDE AND ALBUTEROL SULFATE 1 DOSE: .5; 2.5 SOLUTION RESPIRATORY (INHALATION) at 19:17

## 2023-12-30 RX ADMIN — IPRATROPIUM BROMIDE AND ALBUTEROL SULFATE 1 DOSE: .5; 2.5 SOLUTION RESPIRATORY (INHALATION) at 01:26

## 2023-12-30 RX ADMIN — IOPAMIDOL 70 ML: 755 INJECTION, SOLUTION INTRAVENOUS at 16:23

## 2023-12-30 ASSESSMENT — PAIN SCALES - GENERAL
PAINLEVEL_OUTOF10: 0

## 2023-12-30 NOTE — PROGRESS NOTES
origin  Hyponatremia compatible with SIADH  Non-ST elevated myocardial infarction compatible with demand ischemia  Viral infection with RSV  Essential hypertension  Gastroesophageal reflux disease    Plan:   Chest x-ray continues to identify large left pleural effusion.  Initial thoracentesis pleural fluid analysis suggested exudative effusion compatible with the diffusely metastatic process identified on imaging.  We will defer additional thoracentesis versus chest tube placement to the pulmonary team.  Sodium is trending in the appropriate direction.  Symptomatic support for the underlying viral infection.  We need to obtain tissue diagnosis of the malignant process and this was discussed with the patient at length.  We are awaiting pleural fluid cytology.  2D echocardiogram has been updated with appropriate function and stable ejection fraction.  Continue current therapy.  See orders for further plan of care.    Greater than 40 minutes of critical care time was spent with the patient.  This time included chart review, , and discussion with those consultants involved in the patient's care.    More than 50% of my  time was spent at the bedside counseling/coordinating care with the patient and/or family with face to face contact.  This time was spent reviewing notes and laboratory data as well as instructing and counseling the patient. Time I spent with the family or surrogate(s) is included only if the patient was incapable of providing the necessary information or participating in medical decisions. I also discussed the differential diagnosis and all of the proposed management plans with the patient and individuals accompanying the patient.    Wilmer requires this high level of physician care and nursing in the ICU due to the complexity of decision management and chance of rapid decline or death.  Continued cardiac monitoring and higher level of nursing are required. I am readily available for any further  decision-making and intervention.     Primitivo Hutchins DO, F.A.C.O.I.  12/30/2023  10:51 AM

## 2023-12-30 NOTE — PROGRESS NOTES
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  He has no fever, chills, rigors he continues to endorse exertional dyspnea.  Patient continues to be on ceftriaxone and doxycycline.  Solu-Medrol was discontinued today due to persistent leukocytosis..  He continues to have dense left-sided hemothorax opacification.  He continues to be hyponatremic.    MEDICATIONS:   ipratropium 0.5 mg-albuterol 2.5 mg  1 Dose Inhalation Q4H RT    cefTRIAXone (ROCEPHIN) IV  2,000 mg IntraVENous Q24H    doxycycline (VIBRAMYCIN) IV  100 mg IntraVENous Q12H    methylPREDNISolone  40 mg IntraVENous Q6H    amLODIPine  10 mg Oral Daily    propranolol  20 mg Oral TID    sodium chloride flush  5-40 mL IntraVENous 2 times per day    propranolol  20 mg Oral Once    pantoprazole  40 mg Oral QAM AC    enoxaparin  40 mg SubCUTAneous Daily      sodium chloride       magnesium sulfate, sodium phosphate 14.37 mmol in sodium chloride 0.9 % 250 mL IVPB **OR** sodium phosphate 28.77 mmol in sodium chloride 0.9 % 500 mL IVPB, potassium chloride **OR** potassium alternative oral replacement **OR** potassium chloride, perflutren lipid microspheres, sodium chloride flush, sodium chloride      REVIEW OF SYSTEMS:  Constitutional: Denies fever, weight loss, night sweats, and fatigue  Skin: Denies pigmentation, dark lesions, and rashes   HEENT: Denies hearing loss, tinnitus, ear drainage, epistaxis, sore throat, and hoarseness.  Cardiovascular: Denies palpitations, chest pain, and chest pressure.  Respiratory: Denies cough, dyspnea at rest, hemoptysis, apnea, and choking.  Gastrointestinal: Denies nausea, vomiting, poor appetite, diarrhea, heartburn or

## 2023-12-30 NOTE — PLAN OF CARE
Problem: Discharge Planning  Goal: Discharge to home or other facility with appropriate resources  12/30/2023 0925 by Logan Vitale, RN  Outcome: Progressing     Problem: Skin/Tissue Integrity  Goal: Absence of new skin breakdown  Description: 1.  Monitor for areas of redness and/or skin breakdown  2.  Assess vascular access sites hourly  3.  Every 4-6 hours minimum:  Change oxygen saturation probe site  4.  Every 4-6 hours:  If on nasal continuous positive airway pressure, respiratory therapy assess nares and determine need for appliance change or resting period.  12/30/2023 0925 by Logan Vitale, RN  Outcome: Progressing     Problem: Respiratory - Adult  Goal: Achieves optimal ventilation and oxygenation  12/30/2023 0925 by Logan Vitale, RN  Outcome: Progressing  Flowsheets (Taken 12/30/2023 0925)  Achieves optimal ventilation and oxygenation: Assess and instruct to report shortness of breath or any respiratory difficulty  Note: Still very short of breath     Problem: Pain  Goal: Verbalizes/displays adequate comfort level or baseline comfort level  12/30/2023 0925 by Logan Vitale, RN  Outcome: Progressing    Problem: Safety - Adult  Goal: Free from fall injury  12/30/2023 0925 by Logan Vitale, RN  Outcome: Progressing

## 2023-12-30 NOTE — CONSULTS
cavity, causing collapse of the left lung.  Only a small portion of the left upper lobe is aerated. 3. Ground-glass opacification at the right lung apex might reflect infectious/inflammatory process or scarring. 4. Sclerotic foci in the T10 and T11 vertebral bodies, cannot exclude metastatic disease.     XR CHEST PORTABLE    Result Date: 12/29/2023  EXAMINATION: ONE XRAY VIEW OF THE CHEST 12/29/2023 1:14 am COMPARISON: Chest x-ray 12/28/2023 HISTORY: ORDERING SYSTEM PROVIDED HISTORY: pneumonia pleural effusion TECHNOLOGIST PROVIDED HISTORY: Reason for exam:->pneumonia pleural effusion FINDINGS: Similar large left pleural effusion with opacity of nearly the entire lung. Right lung is unchanged.  Cardiomediastinal silhouette is mostly obscured. No new findings.     Stable chest radiograph with large left pleural effusion and opacity involving nearly the entire lung.     XR CHEST (2 VW)    Result Date: 12/28/2023  EXAMINATION: TWO XRAY VIEWS OF THE CHEST 12/28/2023 9:21 pm COMPARISON: None. HISTORY: ORDERING SYSTEM PROVIDED HISTORY: eval pleural effusion TECHNOLOGIST PROVIDED HISTORY: Reason for exam:->eval pleural effusion FINDINGS: There is dense consolidation of the majority of the left lung with residual aeration of the medial left apex from a massive left pleural effusion.  There is moderate shift of the mediastinum towards the right. The right chest is clear. The heart and mediastinum cannot be adequately evaluated because of the extensive density throughout the left hemithorax. There is a moderately displaced fracture of the posterior-lateral left 8th rib.  The margins are indistinct, and this may be a pathologic fracture.     1. Massive left pleural effusion with dense consolidation of the majority of the left lung. 2. Moderate shift of the mediastinum towards the right. 3. Moderately displaced fracture of the posterior-lateral left 8th rib. The margins are indistinct, and this may be a pathologic fracture.

## 2023-12-30 NOTE — PLAN OF CARE
Problem: Discharge Planning  Goal: Discharge to home or other facility with appropriate resources  Outcome: Progressing     Problem: Skin/Tissue Integrity  Goal: Absence of new skin breakdown  Description: 1.  Monitor for areas of redness and/or skin breakdown  2.  Assess vascular access sites hourly  3.  Every 4-6 hours minimum:  Change oxygen saturation probe site  4.  Every 4-6 hours:  If on nasal continuous positive airway pressure, respiratory therapy assess nares and determine need for appliance change or resting period.  Outcome: Progressing     Problem: Respiratory - Adult  Goal: Achieves optimal ventilation and oxygenation  Outcome: Progressing     Problem: Metabolic/Fluid and Electrolytes - Adult  Goal: Electrolytes maintained within normal limits  Outcome: Progressing     Problem: Pain  Goal: Verbalizes/displays adequate comfort level or baseline comfort level  Outcome: Progressing     Problem: Safety - Adult  Goal: Free from fall injury  Outcome: Progressing

## 2023-12-31 ENCOUNTER — APPOINTMENT (OUTPATIENT)
Dept: GENERAL RADIOLOGY | Age: 78
DRG: 180 | End: 2023-12-31
Payer: MEDICARE

## 2023-12-31 PROBLEM — J90 PLEURAL EFFUSION: Status: ACTIVE | Noted: 2023-12-31

## 2023-12-31 LAB
ANION GAP SERPL CALCULATED.3IONS-SCNC: 7 MMOL/L (ref 7–16)
ANION GAP SERPL CALCULATED.3IONS-SCNC: 9 MMOL/L (ref 7–16)
BASOPHILS # BLD: 0 K/UL (ref 0–0.2)
BASOPHILS NFR BLD: 0 % (ref 0–2)
BUN SERPL-MCNC: 23 MG/DL (ref 6–23)
BUN SERPL-MCNC: 34 MG/DL (ref 6–23)
CALCIUM SERPL-MCNC: 8.7 MG/DL (ref 8.6–10.2)
CALCIUM SERPL-MCNC: 8.9 MG/DL (ref 8.6–10.2)
CARCINOEMBRYONIC ANTIGEN: 1000 NG/ML
CHLORIDE SERPL-SCNC: 94 MMOL/L (ref 98–107)
CHLORIDE SERPL-SCNC: 97 MMOL/L (ref 98–107)
CO2 SERPL-SCNC: 27 MMOL/L (ref 22–29)
CO2 SERPL-SCNC: 30 MMOL/L (ref 22–29)
CREAT SERPL-MCNC: 0.8 MG/DL (ref 0.7–1.2)
CREAT SERPL-MCNC: 0.9 MG/DL (ref 0.7–1.2)
CRITICAL: NORMAL
DATE ANALYZED: NORMAL
DATE OF COLLECTION: NORMAL
EOSINOPHIL # BLD: 0 K/UL (ref 0.05–0.5)
EOSINOPHILS RELATIVE PERCENT: 0 % (ref 0–6)
ERYTHROCYTE [DISTWIDTH] IN BLOOD BY AUTOMATED COUNT: 11.9 % (ref 11.5–15)
GFR SERPL CREATININE-BSD FRML MDRD: >60 ML/MIN/1.73M2
GFR SERPL CREATININE-BSD FRML MDRD: >60 ML/MIN/1.73M2
GLUCOSE SERPL-MCNC: 103 MG/DL (ref 74–99)
GLUCOSE SERPL-MCNC: 115 MG/DL (ref 74–99)
HCT VFR BLD AUTO: 33.9 % (ref 37–54)
HGB BLD-MCNC: 12.2 G/DL (ref 12.5–16.5)
LAB: NORMAL
LDH FLD L TO P-CCNC: 1073 U/L
LYMPHOCYTES NFR BLD: 2.21 K/UL (ref 1.5–4)
LYMPHOCYTES RELATIVE PERCENT: 10 % (ref 20–42)
Lab: 1022
MAGNESIUM SERPL-MCNC: 2.2 MG/DL (ref 1.6–2.6)
MCH RBC QN AUTO: 32.5 PG (ref 26–35)
MCHC RBC AUTO-ENTMCNC: 36 G/DL (ref 32–34.5)
MCV RBC AUTO: 90.4 FL (ref 80–99.9)
MONOCYTES NFR BLD: 0.6 K/UL (ref 0.1–0.95)
MONOCYTES NFR BLD: 3 % (ref 2–12)
NEUTROPHILS NFR BLD: 88 % (ref 43–80)
NEUTS SEG NFR BLD: 20.49 K/UL (ref 1.8–7.3)
OPERATOR ID: 274
PH FLUID: 7.38
PHOSPHATE SERPL-MCNC: 3 MG/DL (ref 2.5–4.5)
PLATELET # BLD AUTO: 234 K/UL (ref 130–450)
PMV BLD AUTO: 8.8 FL (ref 7–12)
POTASSIUM SERPL-SCNC: 3.8 MMOL/L (ref 3.5–5)
POTASSIUM SERPL-SCNC: 3.8 MMOL/L (ref 3.5–5)
RBC # BLD AUTO: 3.75 M/UL (ref 3.8–5.8)
RBC # BLD: ABNORMAL 10*6/UL
SODIUM SERPL-SCNC: 130 MMOL/L (ref 132–146)
SODIUM SERPL-SCNC: 134 MMOL/L (ref 132–146)
SOURCE, BLOOD GAS: NORMAL
SPECIMEN TYPE: NORMAL
SPECIMEN TYPE: NORMAL
TIME ANALYZED: 1028
WBC OTHER # BLD: 23.3 K/UL (ref 4.5–11.5)

## 2023-12-31 PROCEDURE — 6360000002 HC RX W HCPCS: Performed by: INTERNAL MEDICINE

## 2023-12-31 PROCEDURE — 83986 ASSAY PH BODY FLUID NOS: CPT

## 2023-12-31 PROCEDURE — 71045 X-RAY EXAM CHEST 1 VIEW: CPT

## 2023-12-31 PROCEDURE — 32551 INSERTION OF CHEST TUBE: CPT | Performed by: INTERNAL MEDICINE

## 2023-12-31 PROCEDURE — 6370000000 HC RX 637 (ALT 250 FOR IP): Performed by: INTERNAL MEDICINE

## 2023-12-31 PROCEDURE — 83615 LACTATE (LD) (LDH) ENZYME: CPT

## 2023-12-31 PROCEDURE — 36415 COLL VENOUS BLD VENIPUNCTURE: CPT

## 2023-12-31 PROCEDURE — 6370000000 HC RX 637 (ALT 250 FOR IP): Performed by: STUDENT IN AN ORGANIZED HEALTH CARE EDUCATION/TRAINING PROGRAM

## 2023-12-31 PROCEDURE — 2500000003 HC RX 250 WO HCPCS: Performed by: INTERNAL MEDICINE

## 2023-12-31 PROCEDURE — 82378 CARCINOEMBRYONIC ANTIGEN: CPT

## 2023-12-31 PROCEDURE — 2000000000 HC ICU R&B

## 2023-12-31 PROCEDURE — 99291 CRITICAL CARE FIRST HOUR: CPT | Performed by: INTERNAL MEDICINE

## 2023-12-31 PROCEDURE — 88112 CYTOPATH CELL ENHANCE TECH: CPT

## 2023-12-31 PROCEDURE — 85025 COMPLETE CBC W/AUTO DIFF WBC: CPT

## 2023-12-31 PROCEDURE — 2700000000 HC OXYGEN THERAPY PER DAY

## 2023-12-31 PROCEDURE — 94640 AIRWAY INHALATION TREATMENT: CPT

## 2023-12-31 PROCEDURE — 88341 IMHCHEM/IMCYTCHM EA ADD ANTB: CPT

## 2023-12-31 PROCEDURE — 88305 TISSUE EXAM BY PATHOLOGIST: CPT

## 2023-12-31 PROCEDURE — 32551 INSERTION OF CHEST TUBE: CPT

## 2023-12-31 PROCEDURE — 88342 IMHCHEM/IMCYTCHM 1ST ANTB: CPT

## 2023-12-31 PROCEDURE — 80048 BASIC METABOLIC PNL TOTAL CA: CPT

## 2023-12-31 PROCEDURE — 6360000002 HC RX W HCPCS

## 2023-12-31 PROCEDURE — 2500000003 HC RX 250 WO HCPCS

## 2023-12-31 PROCEDURE — 2580000003 HC RX 258

## 2023-12-31 PROCEDURE — 87070 CULTURE OTHR SPECIMN AEROBIC: CPT

## 2023-12-31 PROCEDURE — 84100 ASSAY OF PHOSPHORUS: CPT

## 2023-12-31 PROCEDURE — 6370000000 HC RX 637 (ALT 250 FOR IP)

## 2023-12-31 PROCEDURE — 83735 ASSAY OF MAGNESIUM: CPT

## 2023-12-31 PROCEDURE — 87205 SMEAR GRAM STAIN: CPT

## 2023-12-31 RX ORDER — LIDOCAINE HYDROCHLORIDE 20 MG/ML
5 INJECTION, SOLUTION EPIDURAL; INFILTRATION; INTRACAUDAL; PERINEURAL ONCE
Status: COMPLETED | OUTPATIENT
Start: 2023-12-31 | End: 2023-12-31

## 2023-12-31 RX ORDER — KETOROLAC TROMETHAMINE 30 MG/ML
30 INJECTION, SOLUTION INTRAMUSCULAR; INTRAVENOUS ONCE
Status: COMPLETED | OUTPATIENT
Start: 2023-12-31 | End: 2023-12-31

## 2023-12-31 RX ORDER — POLYETHYLENE GLYCOL 3350 17 G/17G
17 POWDER, FOR SOLUTION ORAL DAILY PRN
Status: DISCONTINUED | OUTPATIENT
Start: 2023-12-31 | End: 2024-01-11 | Stop reason: HOSPADM

## 2023-12-31 RX ORDER — LIDOCAINE HYDROCHLORIDE 20 MG/ML
INJECTION, SOLUTION INFILTRATION; PERINEURAL
Status: DISCONTINUED
Start: 2023-12-31 | End: 2023-12-31

## 2023-12-31 RX ORDER — DOCUSATE SODIUM 100 MG/1
100 CAPSULE, LIQUID FILLED ORAL DAILY
Status: DISCONTINUED | OUTPATIENT
Start: 2023-12-31 | End: 2024-01-11 | Stop reason: HOSPADM

## 2023-12-31 RX ADMIN — IPRATROPIUM BROMIDE AND ALBUTEROL SULFATE 1 DOSE: .5; 2.5 SOLUTION RESPIRATORY (INHALATION) at 06:17

## 2023-12-31 RX ADMIN — IPRATROPIUM BROMIDE AND ALBUTEROL SULFATE 1 DOSE: .5; 2.5 SOLUTION RESPIRATORY (INHALATION) at 09:20

## 2023-12-31 RX ADMIN — DOCUSATE SODIUM 100 MG: 100 CAPSULE, LIQUID FILLED ORAL at 07:58

## 2023-12-31 RX ADMIN — CEFTRIAXONE SODIUM 2000 MG: 2 INJECTION, POWDER, FOR SOLUTION INTRAMUSCULAR; INTRAVENOUS at 01:20

## 2023-12-31 RX ADMIN — LIDOCAINE HYDROCHLORIDE 5 ML: 20 INJECTION, SOLUTION EPIDURAL; INFILTRATION; INTRACAUDAL; PERINEURAL at 12:26

## 2023-12-31 RX ADMIN — PROPRANOLOL HYDROCHLORIDE 20 MG: 10 TABLET ORAL at 21:42

## 2023-12-31 RX ADMIN — ENOXAPARIN SODIUM 40 MG: 100 INJECTION SUBCUTANEOUS at 10:31

## 2023-12-31 RX ADMIN — IPRATROPIUM BROMIDE AND ALBUTEROL SULFATE 1 DOSE: .5; 2.5 SOLUTION RESPIRATORY (INHALATION) at 01:03

## 2023-12-31 RX ADMIN — KETOROLAC TROMETHAMINE 30 MG: 30 INJECTION, SOLUTION INTRAMUSCULAR; INTRAVENOUS at 14:03

## 2023-12-31 RX ADMIN — PANTOPRAZOLE SODIUM 40 MG: 40 TABLET, DELAYED RELEASE ORAL at 06:50

## 2023-12-31 RX ADMIN — IPRATROPIUM BROMIDE AND ALBUTEROL SULFATE 1 DOSE: .5; 2.5 SOLUTION RESPIRATORY (INHALATION) at 17:20

## 2023-12-31 RX ADMIN — DOXYCYCLINE 100 MG: 100 INJECTION, POWDER, LYOPHILIZED, FOR SOLUTION INTRAVENOUS at 12:29

## 2023-12-31 RX ADMIN — Medication 15 G: at 09:02

## 2023-12-31 RX ADMIN — Medication 10 ML: at 08:01

## 2023-12-31 RX ADMIN — PROPRANOLOL HYDROCHLORIDE 20 MG: 10 TABLET ORAL at 12:27

## 2023-12-31 RX ADMIN — Medication 10 ML: at 21:43

## 2023-12-31 RX ADMIN — POLYETHYLENE GLYCOL 3350 17 G: 17 POWDER, FOR SOLUTION ORAL at 07:58

## 2023-12-31 RX ADMIN — DOXYCYCLINE 100 MG: 100 INJECTION, POWDER, LYOPHILIZED, FOR SOLUTION INTRAVENOUS at 01:22

## 2023-12-31 RX ADMIN — IPRATROPIUM BROMIDE AND ALBUTEROL SULFATE 1 DOSE: .5; 2.5 SOLUTION RESPIRATORY (INHALATION) at 22:15

## 2023-12-31 RX ADMIN — PROPRANOLOL HYDROCHLORIDE 20 MG: 10 TABLET ORAL at 07:58

## 2023-12-31 ASSESSMENT — PAIN SCALES - GENERAL
PAINLEVEL_OUTOF10: 0
PAINLEVEL_OUTOF10: 4
PAINLEVEL_OUTOF10: 0

## 2023-12-31 ASSESSMENT — PAIN DESCRIPTION - ONSET: ONSET: SUDDEN

## 2023-12-31 ASSESSMENT — PAIN DESCRIPTION - LOCATION
LOCATION: CHEST
LOCATION: BACK

## 2023-12-31 ASSESSMENT — PAIN DESCRIPTION - ORIENTATION
ORIENTATION: LEFT
ORIENTATION: LEFT

## 2023-12-31 ASSESSMENT — PAIN DESCRIPTION - DESCRIPTORS
DESCRIPTORS: DISCOMFORT;ACHING
DESCRIPTORS: SORE

## 2023-12-31 ASSESSMENT — PAIN DESCRIPTION - FREQUENCY: FREQUENCY: CONTINUOUS

## 2023-12-31 ASSESSMENT — PAIN DESCRIPTION - PAIN TYPE: TYPE: ACUTE PAIN

## 2023-12-31 ASSESSMENT — PAIN - FUNCTIONAL ASSESSMENT: PAIN_FUNCTIONAL_ASSESSMENT: PREVENTS OR INTERFERES SOME ACTIVE ACTIVITIES AND ADLS

## 2023-12-31 NOTE — PROGRESS NOTES
a repeat examination.   2. Large left pleural effusion occupying the majority of the chest cavity,   causing collapse of the left lung.  Only a small portion of the left upper   lobe is aerated.   3. Ground-glass opacification at the right lung apex might reflect   infectious/inflammatory process or scarring.   4. Sclerotic foci in the T10 and T11 vertebral bodies, cannot exclude   metastatic disease.         XR CHEST PORTABLE   Final Result   Stable chest radiograph with large left pleural effusion and opacity   involving nearly the entire lung.         XR CHEST (2 VW)   Final Result   1. Massive left pleural effusion with dense consolidation of the majority of   the left lung.   2. Moderate shift of the mediastinum towards the right.   3. Moderately displaced fracture of the posterior-lateral left 8th rib. The   margins are indistinct, and this may be a pathologic fracture.         Vascular duplex lower extremity venous bilateral   Final Result   No evidence of DVT in either lower extremity.         IR GUIDED THORACENTESIS PLEURAL    (Results Pending)   XR CHEST PORTABLE    (Results Pending)           PROBLEM LIST:  Principal Problem:    Metastatic cancer (HCC)  Active Problems:    Hyponatremia  Resolved Problems:    * No resolved hospital problems. *      IMPRESSION:  Large left pleural effusion causing contralateral shift of the mediastinum to the right  Hyponatremia most consistent with SIADH  Elevated PSA  Evidence of likely bone metastases  Elevated alkaline phosphatase  Nontraumatic rib fractures, pathological etiology is possible  Elevated troponin possible NSTEMI versus demand ischemia  Hyperlipidemia  Systemic hypertension    PLAN:  Pleural effusion is exudative.  Diet ordered  Continue with ceftriaxone and doxycycline for 5 to 7 days  CT abdomen and pelvis / CT head reviewed   Prophylactic DVT treatment  Left-sided chest tube inserted for large pleural effusion.  Nephrology is managing

## 2023-12-31 NOTE — PROGRESS NOTES
[facility] Nephrology Progress Note  Patient's Name: Wilmre Fuentes  12:44 PM  12/31/2023    Nephrologist: N/A    Reason for Consult:  hyponatremia  Requesting Physician:  Dr. Pollock    Chief Complaint:  worsening SOB and cough    History Obtained From:  patient, chart    History of Present Illness (12/29 consult):    Wilmer Fuentes is a 78 y.o. male with past medical history of hypertension and hyperlipidemia who presented to the emergency department last evening with worsening cough and shortness of breath.  The symptoms have been going on for 2 to 3 weeks.  Patient stated to the ER physician that he was at [facility] and had an x-ray which showed a pleural effusion, he then came to the emergency room.    Initial labs showed a serum sodium of 120 mmol/L at 8 PM last evening.  Nephrology was consulted for hyponatremia.    Initial medical management included IV Solu-Medrol for the breathing issues, 50 mL an hour of normal saline which had run for about 2 hours this morning, and that was it as the ER spoke with me last night and I recommended a strategy of fluid restriction and ordered labs including serum, urine osmolarity and urine sodium.    Of concern was a CT angiogram and chest x-ray, his CT angiogram showed a large left pleural effusion causing collapse of the left lung.    I saw the patient in the intensive care unit this morning.  He said the cough and trouble breathing and been going on for a couple of weeks.  He also noticed some increased leg swelling.  Endorse some loss of appetite, however denied weight loss.  No falls, no unsteady gait though he does ambulate with a cane at baseline.  No fevers or chills.  No dizziness, headache, lightheadedness, mental status changes.  He was hemodynamically stable with normal blood pressures.  Patient overall is euvolemic.    In terms of medication use, he says he is on lisinopril/hydrochlorothiazide for blood pressure.  He has endorsed NSAID use for mild pain in the

## 2023-12-31 NOTE — PROGRESS NOTES
Internal Medicine Progress Note    [facility]    Jonnathan Pollock D.O., STACIA.                    Primitivo Hutchins D.O., CICI Arellano D.O.    Joan Rai, MSN, APRN, NP-C  Chavez Harmon, MSN, APRN-CNP  Reid Oliveros, MSN, APRN, NP-C     Primary Care Physician: Todd Cross DO   Admitting Physician:  Jonnathan Pollock DO  Admission date and time: 12/28/2023  8:08 PM    Room:  [place] [address]  Admitting diagnosis: Hyponatremia [E87.1]  Pleural effusion [J90]    Patient Name: Wilmer Fuentes  MRN: [id number]    Date of Service: 12/31/2023     Subjective:  Wilmer is a 78 y.o. male who was seen and examined today,12/31/2023, at the bedside. Wilmer remains short of breath as expected.  Plans are for chest tube placement this morning.  Pleural fluid cytology remains pending.  No family members were present during my examination and multiple subspecialists continue to follow.    Review of System:   Constitutional:   Admits to generalized malaise and fatigue.  HEENT:   Denies ear pain, sore throat, sinus or eye problems.  Cardiovascular:   Denies any chest pain, irregular heartbeats, or palpitations.   Respiratory:   Admits to ongoing shortness of breath both at rest and with exertion.  He describes orthopnea.  Gastrointestinal:   Decreased appetite and therefore decreased oral intake.  He denies weight loss.  Genitourinary:    Denies any urgency, frequency, hematuria. Voiding without difficulty.  Extremities:   Denies lower extremity swelling, edema or cyanosis.   Neurology:    Denies any headache or focal neurological deficits, ongoing weakness and deconditioning.  Psch:   Denies being anxious or depressed.  Musculoskeletal:    Denies  myalgias, joint complaints or back pain.   Integumentary:   Denies any rashes, ulcers, or excoriations.  Denies bruising.  Hematologic/Lymphatic:  Denies bruising or bleeding.    Physical Exam:  I/O this shift:  In: 240  pending.  We appreciate the input from the multiple subspecialist providing care.  I anticipate significant improvement in his respiratory distress following pleural fluid evacuation.    Greater than 40 minutes of critical care time was spent with the patient.  This time included chart review, , and discussion with those consultants involved in the patient's care.    More than 50% of my  time was spent at the bedside counseling/coordinating care with the patient and/or family with face to face contact.  This time was spent reviewing notes and laboratory data as well as instructing and counseling the patient. Time I spent with the family or surrogate(s) is included only if the patient was incapable of providing the necessary information or participating in medical decisions. I also discussed the differential diagnosis and all of the proposed management plans with the patient and individuals accompanying the patient.    Wilmer requires this high level of physician care and nursing in the ICU due to the complexity of decision management and chance of rapid decline or death.  Continued cardiac monitoring and higher level of nursing are required. I am readily available for any further decision-making and intervention.     Primitivo Hutchins DO, F.A.C.O.I.  12/31/2023  9:45 AM

## 2023-12-31 NOTE — PROCEDURES
Date of procedure: December 31, 2020    PULMONARY PROCEDURE:  Chest Tube Placement (Pigtail Catheter)    INDICATION:   Left sided pleural effusion ([id number] size).     PROCEDURE :   Brown Amador MD    CONSENT:  Consent was obtained prior to the procedure and placed in the paper chart.  Indications, risks, and benefits were explained at length.     PROCEDURE SUMMARY:   A time out was performed. The patient was prepped and draped in a sterile manner using chlorhexidine scrub after the appropriate level was percussed and confirmed by ultrasound. 1% lidocaine was used to numb the region. A finder needle was then used to attempt to locate fluid in the 6th intercostal space at the mid axillary level.    Serosanguinous Fluid was aspirated. A 10-blade scalpel used to make incision. A 14 Niuean pigtail catheter was then threaded without difficulty. Chest tube was attached to pleurovc and placed on wall suction -30. No immediate complications were noted during the procedure.     I was present during the entire procedure. A post-procedure chest x-ray done and no pneumothorax seen.    ESTIMATED BLOOD LOSS:    Minimal        Electronically signed by Brown Amador MD on 12/31/2023 at 11:47 AM

## 2023-12-31 NOTE — PLAN OF CARE
Problem: Skin/Tissue Integrity  Goal: Absence of new skin breakdown  Description: 1.  Monitor for areas of redness and/or skin breakdown  2.  Assess vascular access sites hourly  3.  Every 4-6 hours minimum:  Change oxygen saturation probe site  4.  Every 4-6 hours:  If on nasal continuous positive airway pressure, respiratory therapy assess nares and determine need for appliance change or resting period.  12/31/2023 1615 by Logan Vitale RN  Outcome: Progressing     Problem: Respiratory - Adult  Goal: Achieves optimal ventilation and oxygenation  12/31/2023 1615 by Logan Vitale RN  Outcome: Progressing     Problem: Metabolic/Fluid and Electrolytes - Adult  Goal: Electrolytes maintained within normal limits  12/31/2023 1615 by Logan Vitale RN  Outcome: Progressing     Problem: Pain  Goal: Verbalizes/displays adequate comfort level or baseline comfort level  12/31/2023 1615 by Logan Vitale RN  Outcome: Progressing     Problem: Safety - Adult  Goal: Free from fall injury  12/31/2023 1615 by Logan Vitale, RN  Outcome: Progressing

## 2023-12-31 NOTE — PROGRESS NOTES
[facility] Nephrology Progress Note  Patient's Name: Wilmer Fuentes  8:15 PM  12/30/2023    Nephrologist: N/A    Reason for Consult:  hyponatremia  Requesting Physician:  Dr. Pollock    Chief Complaint:  worsening SOB and cough    History Obtained From:  patient, chart    History of Present Illness (12/29 consult):    Wilmer Fuentes is a 78 y.o. male with past medical history of hypertension and hyperlipidemia who presented to the emergency department last evening with worsening cough and shortness of breath.  The symptoms have been going on for 2 to 3 weeks.  Patient stated to the ER physician that he was at [facility] and had an x-ray which showed a pleural effusion, he then came to the emergency room.    Initial labs showed a serum sodium of 120 mmol/L at 8 PM last evening.  Nephrology was consulted for hyponatremia.    Initial medical management included IV Solu-Medrol for the breathing issues, 50 mL an hour of normal saline which had run for about 2 hours this morning, and that was it as the ER spoke with me last night and I recommended a strategy of fluid restriction and ordered labs including serum, urine osmolarity and urine sodium.    Of concern was a CT angiogram and chest x-ray, his CT angiogram showed a large left pleural effusion causing collapse of the left lung.    I saw the patient in the intensive care unit this morning.  He said the cough and trouble breathing and been going on for a couple of weeks.  He also noticed some increased leg swelling.  Endorse some loss of appetite, however denied weight loss.  No falls, no unsteady gait though he does ambulate with a cane at baseline.  No fevers or chills.  No dizziness, headache, lightheadedness, mental status changes.  He was hemodynamically stable with normal blood pressures.  Patient overall is euvolemic.    In terms of medication use, he says he is on lisinopril/hydrochlorothiazide for blood pressure.  He has endorsed NSAID use for mild pain in the

## 2024-01-01 ENCOUNTER — APPOINTMENT (OUTPATIENT)
Dept: GENERAL RADIOLOGY | Age: 79
DRG: 180 | End: 2024-01-01
Payer: MEDICARE

## 2024-01-01 LAB
ANION GAP SERPL CALCULATED.3IONS-SCNC: 5 MMOL/L (ref 7–16)
ANION GAP SERPL CALCULATED.3IONS-SCNC: 9 MMOL/L (ref 7–16)
BASOPHILS # BLD: 0.03 K/UL (ref 0–0.2)
BASOPHILS NFR BLD: 0 % (ref 0–2)
BUN SERPL-MCNC: 26 MG/DL (ref 6–23)
BUN SERPL-MCNC: 28 MG/DL (ref 6–23)
CALCIUM SERPL-MCNC: 8.5 MG/DL (ref 8.6–10.2)
CALCIUM SERPL-MCNC: 8.7 MG/DL (ref 8.6–10.2)
CHLORIDE SERPL-SCNC: 94 MMOL/L (ref 98–107)
CHLORIDE SERPL-SCNC: 96 MMOL/L (ref 98–107)
CO2 SERPL-SCNC: 29 MMOL/L (ref 22–29)
CO2 SERPL-SCNC: 30 MMOL/L (ref 22–29)
CREAT SERPL-MCNC: 0.9 MG/DL (ref 0.7–1.2)
CREAT SERPL-MCNC: 0.9 MG/DL (ref 0.7–1.2)
EOSINOPHIL # BLD: 0 K/UL (ref 0.05–0.5)
EOSINOPHILS RELATIVE PERCENT: 0 % (ref 0–6)
ERYTHROCYTE [DISTWIDTH] IN BLOOD BY AUTOMATED COUNT: 12.4 % (ref 11.5–15)
GFR SERPL CREATININE-BSD FRML MDRD: >60 ML/MIN/1.73M2
GFR SERPL CREATININE-BSD FRML MDRD: >60 ML/MIN/1.73M2
GLUCOSE SERPL-MCNC: 155 MG/DL (ref 74–99)
GLUCOSE SERPL-MCNC: 86 MG/DL (ref 74–99)
HCT VFR BLD AUTO: 35.2 % (ref 37–54)
HGB BLD-MCNC: 12.1 G/DL (ref 12.5–16.5)
IMM GRANULOCYTES # BLD AUTO: 0.07 K/UL (ref 0–0.58)
IMM GRANULOCYTES NFR BLD: 1 % (ref 0–5)
LYMPHOCYTES NFR BLD: 1.6 K/UL (ref 1.5–4)
LYMPHOCYTES RELATIVE PERCENT: 18 % (ref 20–42)
MAGNESIUM SERPL-MCNC: 2.2 MG/DL (ref 1.6–2.6)
MCH RBC QN AUTO: 31.3 PG (ref 26–35)
MCHC RBC AUTO-ENTMCNC: 34.4 G/DL (ref 32–34.5)
MCV RBC AUTO: 91.2 FL (ref 80–99.9)
MICROORGANISM SPEC CULT: NO GROWTH
MICROORGANISM/AGENT SPEC: NORMAL
MONOCYTES NFR BLD: 1.27 K/UL (ref 0.1–0.95)
MONOCYTES NFR BLD: 14 % (ref 2–12)
NEUTROPHILS NFR BLD: 67 % (ref 43–80)
NEUTS SEG NFR BLD: 5.98 K/UL (ref 1.8–7.3)
PHOSPHATE SERPL-MCNC: 3.6 MG/DL (ref 2.5–4.5)
PLATELET # BLD AUTO: 187 K/UL (ref 130–450)
PMV BLD AUTO: 9 FL (ref 7–12)
POTASSIUM SERPL-SCNC: 3.7 MMOL/L (ref 3.5–5)
POTASSIUM SERPL-SCNC: 3.8 MMOL/L (ref 3.5–5)
RBC # BLD AUTO: 3.86 M/UL (ref 3.8–5.8)
SODIUM SERPL-SCNC: 131 MMOL/L (ref 132–146)
SODIUM SERPL-SCNC: 132 MMOL/L (ref 132–146)
SPECIMEN DESCRIPTION: NORMAL
WBC OTHER # BLD: 9 K/UL (ref 4.5–11.5)

## 2024-01-01 PROCEDURE — 2500000003 HC RX 250 WO HCPCS

## 2024-01-01 PROCEDURE — 6360000002 HC RX W HCPCS

## 2024-01-01 PROCEDURE — 80048 BASIC METABOLIC PNL TOTAL CA: CPT

## 2024-01-01 PROCEDURE — 2580000003 HC RX 258: Performed by: INTERNAL MEDICINE

## 2024-01-01 PROCEDURE — 6370000000 HC RX 637 (ALT 250 FOR IP)

## 2024-01-01 PROCEDURE — 2700000000 HC OXYGEN THERAPY PER DAY

## 2024-01-01 PROCEDURE — 2500000003 HC RX 250 WO HCPCS: Performed by: INTERNAL MEDICINE

## 2024-01-01 PROCEDURE — 83735 ASSAY OF MAGNESIUM: CPT

## 2024-01-01 PROCEDURE — 2580000003 HC RX 258

## 2024-01-01 PROCEDURE — 6370000000 HC RX 637 (ALT 250 FOR IP): Performed by: STUDENT IN AN ORGANIZED HEALTH CARE EDUCATION/TRAINING PROGRAM

## 2024-01-01 PROCEDURE — 6370000000 HC RX 637 (ALT 250 FOR IP): Performed by: INTERNAL MEDICINE

## 2024-01-01 PROCEDURE — 97530 THERAPEUTIC ACTIVITIES: CPT | Performed by: OCCUPATIONAL THERAPIST

## 2024-01-01 PROCEDURE — 99291 CRITICAL CARE FIRST HOUR: CPT | Performed by: INTERNAL MEDICINE

## 2024-01-01 PROCEDURE — 36415 COLL VENOUS BLD VENIPUNCTURE: CPT

## 2024-01-01 PROCEDURE — 6360000002 HC RX W HCPCS: Performed by: INTERNAL MEDICINE

## 2024-01-01 PROCEDURE — 85025 COMPLETE CBC W/AUTO DIFF WBC: CPT

## 2024-01-01 PROCEDURE — 94640 AIRWAY INHALATION TREATMENT: CPT

## 2024-01-01 PROCEDURE — 97535 SELF CARE MNGMENT TRAINING: CPT | Performed by: OCCUPATIONAL THERAPIST

## 2024-01-01 PROCEDURE — 2060000000 HC ICU INTERMEDIATE R&B

## 2024-01-01 PROCEDURE — 97165 OT EVAL LOW COMPLEX 30 MIN: CPT | Performed by: OCCUPATIONAL THERAPIST

## 2024-01-01 PROCEDURE — 84100 ASSAY OF PHOSPHORUS: CPT

## 2024-01-01 PROCEDURE — 71045 X-RAY EXAM CHEST 1 VIEW: CPT

## 2024-01-01 RX ORDER — KETOROLAC TROMETHAMINE 15 MG/ML
15 INJECTION, SOLUTION INTRAMUSCULAR; INTRAVENOUS EVERY 6 HOURS PRN
Status: DISPENSED | OUTPATIENT
Start: 2024-01-01 | End: 2024-01-06

## 2024-01-01 RX ADMIN — Medication 15 G: at 08:13

## 2024-01-01 RX ADMIN — IPRATROPIUM BROMIDE AND ALBUTEROL SULFATE 1 DOSE: .5; 2.5 SOLUTION RESPIRATORY (INHALATION) at 15:18

## 2024-01-01 RX ADMIN — IPRATROPIUM BROMIDE AND ALBUTEROL SULFATE 1 DOSE: .5; 2.5 SOLUTION RESPIRATORY (INHALATION) at 02:01

## 2024-01-01 RX ADMIN — DOXYCYCLINE 100 MG: 100 INJECTION, POWDER, LYOPHILIZED, FOR SOLUTION INTRAVENOUS at 22:44

## 2024-01-01 RX ADMIN — PROPRANOLOL HYDROCHLORIDE 20 MG: 10 TABLET ORAL at 15:30

## 2024-01-01 RX ADMIN — IPRATROPIUM BROMIDE AND ALBUTEROL SULFATE 1 DOSE: .5; 2.5 SOLUTION RESPIRATORY (INHALATION) at 21:37

## 2024-01-01 RX ADMIN — DOCUSATE SODIUM 100 MG: 100 CAPSULE, LIQUID FILLED ORAL at 08:12

## 2024-01-01 RX ADMIN — IPRATROPIUM BROMIDE AND ALBUTEROL SULFATE 1 DOSE: .5; 2.5 SOLUTION RESPIRATORY (INHALATION) at 10:26

## 2024-01-01 RX ADMIN — IPRATROPIUM BROMIDE AND ALBUTEROL SULFATE 1 DOSE: .5; 2.5 SOLUTION RESPIRATORY (INHALATION) at 07:11

## 2024-01-01 RX ADMIN — ENOXAPARIN SODIUM 40 MG: 100 INJECTION SUBCUTANEOUS at 08:30

## 2024-01-01 RX ADMIN — PROPRANOLOL HYDROCHLORIDE 20 MG: 10 TABLET ORAL at 08:12

## 2024-01-01 RX ADMIN — IPRATROPIUM BROMIDE AND ALBUTEROL SULFATE 1 DOSE: .5; 2.5 SOLUTION RESPIRATORY (INHALATION) at 17:44

## 2024-01-01 RX ADMIN — DOXYCYCLINE 100 MG: 100 INJECTION, POWDER, LYOPHILIZED, FOR SOLUTION INTRAVENOUS at 12:58

## 2024-01-01 RX ADMIN — CEFTRIAXONE SODIUM 2000 MG: 2 INJECTION, POWDER, FOR SOLUTION INTRAMUSCULAR; INTRAVENOUS at 00:31

## 2024-01-01 RX ADMIN — CEFTRIAXONE SODIUM 2000 MG: 2 INJECTION, POWDER, FOR SOLUTION INTRAMUSCULAR; INTRAVENOUS at 23:00

## 2024-01-01 RX ADMIN — PROPRANOLOL HYDROCHLORIDE 20 MG: 10 TABLET ORAL at 22:07

## 2024-01-01 RX ADMIN — Medication 10 ML: at 08:13

## 2024-01-01 RX ADMIN — PANTOPRAZOLE SODIUM 40 MG: 40 TABLET, DELAYED RELEASE ORAL at 06:51

## 2024-01-01 RX ADMIN — DOXYCYCLINE 100 MG: 100 INJECTION, POWDER, LYOPHILIZED, FOR SOLUTION INTRAVENOUS at 00:36

## 2024-01-01 RX ADMIN — Medication 10 ML: at 22:08

## 2024-01-01 ASSESSMENT — PAIN SCALES - GENERAL
PAINLEVEL_OUTOF10: 0

## 2024-01-01 NOTE — PROGRESS NOTES
Internal Medicine Progress Note    [facility]    Jonnathan Pollock D.O., CICI Hutchins D.O., CICI Arellano D.O.    Joan Rai, MSN, APRN, NP-C  Chavez Harmon, MSN, APRN-CNP  Reid Oliveros, MSN, APRN, NP-C     Primary Care Physician: Todd Cross DO   Admitting Physician:  Jonnathan Pollock DO  Admission date and time: 12/28/2023  8:08 PM    Room:  [place] [address]  Admitting diagnosis: Hyponatremia [E87.1]  Pleural effusion [J90]    Patient Name: Wilmer Fuentes  MRN: [id number]    Date of Service: 1/1/2024     Subjective:  Wilmer is a 78 y.o. male who was seen and examined today,1/1/2024, at the bedside. Wilmer underwent chest tube placement yesterday.  A total of 3 L of fluid were removed and chest tube has been clamped.  He is becoming increasingly more symptomatic with increased shortness of breath this morning.  Pleural fluid cytology remains pending at this point.  Multiple subspecialist continue to follow.  No family members were present during my examination.    Review of System:   Constitutional:   Admits to generalized malaise and fatigue.  Becoming increasingly frustrated with ongoing hospitalization and declining medical condition.  HEENT:   Denies ear pain, sore throat, sinus or eye problems.  Cardiovascular:   Denies any chest pain, irregular heartbeats, or palpitations.   Respiratory:   Admits to ongoing shortness of breath both at rest and with exertion.  He describes orthopnea.  Gastrointestinal:   Decreased appetite and therefore decreased oral intake.  He denies weight loss.  Genitourinary:    Denies any urgency, frequency, hematuria. Voiding without difficulty.  Extremities:   Denies lower extremity swelling, edema or cyanosis.   Neurology:    Denies any headache or focal neurological deficits, ongoing weakness and deconditioning.  Psch:   Denies being anxious or depressed.  Musculoskeletal:    Denies

## 2024-01-01 NOTE — PROGRESS NOTES
OCCUPATIONAL THERAPY INITIAL EVALUATION    [facility]  [address]         Date:2024                                                   Patient Name: Wilmer Fuentes     MRN: [id number]     : 1945     Room: [place] [address]       Evaluating OT: Allyson Meza, OTR/L; [id number]       Referring Provider and Orders/Date:    OT eval and treat  Start:  23,   End:  23 104,   ONE TIME,   Standing Count:  1 Occurrences,   R       Brown Bennett MD    Recommended placement: home with  and family care       Diagnosis:   1. Hyponatremia    2. Pleural effusion    3. Postprocedural pneumothorax         Surgery: : chest tube; 23: thoracentesis       Pertinent Medical History:        Past Medical History:   Diagnosis Date    Anxiety     Hyperlipidemia     Hypertension           Past Surgical History:   Procedure Laterality Date    ADRENALECTOMY      COLONOSCOPY      PROSTATE SURGERY      UPPER GASTROINTESTINAL ENDOSCOPY         Precautions:  Fall Risk, Contact/droplet RSV, chest tube, 3L, mcnamara        Assessment of current deficits     [x] Functional mobility  [x]ADLs  [x] Strength               []Cognition     [x] Functional transfers   [x] IADLs         [x] Safety Awareness   [x]Endurance     [] Fine Coordination              [x] Balance      [] Vision/perception   []Sensation      [x]Gross Motor Coordination  [] ROM  [] Delirium                   [] Motor Control     OT PLAN OF CARE   OT POC based on physician orders, patient diagnosis and results of clinical assessment    Frequency/Duration 1-3 days/wk for 2 weeks PRN   Specific OT Treatment Interventions to include:   * Instruction/training on adapted ADL techniques and AE recommendations to increase functional independence within precautions       * Training on energy conservation strategies, correct breathing pattern and techniques to improve  Lot  How much help is needed for toileting (which includes using toilet, bedpan, or urinal)?: Total  How much help is needed for putting on and taking off regular upper body clothing?: A Lot  How much help is needed for taking care of personal grooming?: A Little  How much help for eating meals?: A Little  AM-[facility] Inpatient Daily Activity Raw Score: 13  AM-[facility] Inpatient ADL T-Scale Score : 32.03  ADL Inpatient CMS 0-100% Score: 63.03  ADL Inpatient CMS G-Code Modifier : CL    Functional Assessment:     Initial Eval Status  Date: 1/1/24 Treatment Status  Date: STGs = LTGs  Time frame: 10-14 days   Feeding Minimal Assist due to tremors  Indep   Grooming Minimal Assist sitting up in chair  Independent    UB Dressing Maximal Assist with gown management from EOB due to lines and chest tubes  Supervision    LB Dressing Maximal Assist with socks  Minimal Assist    Bathing Moderate Assist suspected, but not assessed on eval due to visitor present  Minimal Assist    Toileting Dependent with management of mcnamara; did report BM to toilet yesterday  Minimal Assist    Bed Mobility  Supine to sit: Moderate Assist     Supine to sit: Independent   Sit to supine: Independent    Functional Transfers Moderate Assist from elevated surface of bed and arm chair with cane.   Modified [place]    Functional Mobility Moderate Assist with cane, but recommending walker  Modified [place]    Balance Sitting:     Static:  fair+    Dynamic:fair  Standing: fair-  Sitting:     Static:  good    Dynamic:fair+  Standing: fair   Activity Tolerance Vitals with activity:124/82 HR 85 98% 3L; 122/75 HR 84 97%; 132/81 HR 86 96%  Fair tolerance overall sitting EOB for 10min and standing 1 min. Extended time with therapeutic rest periods  Increase standing tolerance for >4min with stable vital signs for carry over into toileting, functional tranfers and indep in ADLs   Visual/  Perceptual Glasses: Present; WFL    Reports change in vision since

## 2024-01-01 NOTE — PLAN OF CARE
Problem: Discharge Planning  Goal: Discharge to home or other facility with appropriate resources  Outcome: Progressing  Flowsheets (Taken 1/1/2024 1744)  Discharge to home or other facility with appropriate resources:   Identify barriers to discharge with patient and caregiver   Arrange for needed discharge resources and transportation as appropriate     Problem: Skin/Tissue Integrity  Goal: Absence of new skin breakdown  Description: 1.  Monitor for areas of redness and/or skin breakdown  2.  Assess vascular access sites hourly  3.  Every 4-6 hours minimum:  Change oxygen saturation probe site  4.  Every 4-6 hours:  If on nasal continuous positive airway pressure, respiratory therapy assess nares and determine need for appliance change or resting period.  Outcome: Progressing     Problem: Respiratory - Adult  Goal: Achieves optimal ventilation and oxygenation  Outcome: Progressing  Flowsheets (Taken 1/1/2024 1744)  Achieves optimal ventilation and oxygenation:   Assess for changes in respiratory status   Assess for changes in mentation and behavior     Problem: Metabolic/Fluid and Electrolytes - Adult  Goal: Electrolytes maintained within normal limits  Outcome: Progressing  Flowsheets (Taken 1/1/2024 1744)  Electrolytes maintained within normal limits: Monitor labs and assess patient for signs and symptoms of electrolyte imbalances     Problem: Pain  Goal: Verbalizes/displays adequate comfort level or baseline comfort level  Outcome: Progressing  Flowsheets (Taken 1/1/2024 1744)  Verbalizes/displays adequate comfort level or baseline comfort level: Encourage patient to monitor pain and request assistance     Problem: Safety - Adult  Goal: Free from fall injury  Outcome: Progressing  Flowsheets (Taken 1/1/2024 1744)  Free From Fall Injury: Instruct family/caregiver on patient safety

## 2024-01-01 NOTE — PROGRESS NOTES
[facility]  Hematology/Oncology  Consult      Patient Name: Wilmer Fuentes  YOB: 1945  PCP: Todd Cross DO   Referring Provider:      Reason for Consultation:   Chief Complaint   Patient presents with    Shortness of Breath     Was at [facility] today and pcp called pt to go to ed, cough couple weeks, no n/v/d, no fever or chills        History of Present Illness: 78-year-old man with past medical history relevant for hypertension, hyperlipidemia and anxiety hospitalized with progressively worsening dyspnea and leg edema.  No fever or chest pain nausea vomiting or hemoptysis.  His BMP on admission showed hyponatremia.  Serum creatinine was normal.  Hepatic panel with hypoalbuminemia with elevated alk phos with normal transaminases and normal bilirubin.  CBC with a hemoglobin of 13 with normal platelet and neutrophilic leukocytosis related to Solumedrol .  PSA was elevated at 20.9.  CEA was 5.0 with minimally elevated  at 37  Chest x-ray and CTA of the chest showed a large left pleural effusion with partial collapse of left upper lobe.  There was groundglass opacities in the right lung apex suggestive of infectious inflammatory process.  Suspicious sclerotic foci at T10 and T11 vertebral bodies noted which may represent metastatic disease .  Bone scans show multiple focal areas of increased activity suspicious for metastatic disease most prominent in the right posterior calvarium and thoracolumbar spine as well as left posterior iliac wing and right hip intertrochanteric area.  CT abdomen, pelvis and CT head pending  Left-sided thoracentesis performed and fluid was exudative with markedly elevated total proteins and LDH highly consistent with malignant effusion and cytology pending.        Diagnostic Data:     Past Medical History:   Diagnosis Date    Anxiety     Hyperlipidemia     Hypertension        Patient Active Problem List    Diagnosis Date Noted    Pleural effusion 12/31/2023  this may be a pathologic fracture.     Vascular duplex lower extremity venous bilateral    Result Date: 12/28/2023  EXAMINATION: DUPLEX VENOUS ULTRASOUND OF THE BILATERAL LOWER [id number]/28/2023 9:08 pm TECHNIQUE: Duplex ultrasound using B-mode/gray scaled imaging and Doppler spectral analysis and color flow was obtained of the deep venous structures of the bilateral extremities. COMPARISON: None. HISTORY: ORDERING SYSTEM PROVIDED HISTORY: LEG SWELLING, PAIN, DVT SUSPECTED FINDINGS: The visualized veins of the bilateral lower extremities are patent and free of echogenic thrombus. The veins demonstrate good compressibility with normal color flow study and spectral analysis.     No evidence of DVT in either lower extremity.         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    1/1/2024  Awake and very interactive and main issue is dyspnea on minimal exertion  No fever or chills.  Continues on ceftriaxone and doxycycline.  Left-sided chest tube was inserted yesterday with over 3 L of output chest tube was put on gravity this morning.  Pleural fluid was exudative with high total proteins and markedly elevated LDH consistent with malignant effusion  Cytology pending  Will need additional thoracentesis for

## 2024-01-01 NOTE — PROGRESS NOTES
The patient was transferred to [facility] on a cardiac monitor and 3L NC. The patient tolerated the transfer well. Report given to PICU RN. All belongings were accounted for by the patient. The patient stated he will update his family on his new bed assignment.

## 2024-01-01 NOTE — PROGRESS NOTES
pulmonary  arterial system is essentially nondiagnostic.  For example, there is  hypoattenuation at the bifurcation of the main pulmonary artery which  probably reflects poorly enhanced blood however cannot entirely exclude a  thrombus.     HEART: Coronary artery calcification.  No pericardial effusion.  Normal heart  size.     MEDIASTINUM: No significant lymphadenopathy.     LUNGS: Large left pleural effusion occupying the majority of the chest  cavity.  Only a small portion of the left upper lobe is aerated.  The  remainder of the left upper lobe is collapsed.  Right apical ground-glass  opacification.     BONES: No acute fracture.  Sclerotic foci in the T10 and T11 vertebral bodies.     ADDITIONAL FINDINGS: Partially visualized cystic lesion which probably arises  from the right kidney upper pole.     IMPRESSION:  1. Evaluation of the pulmonary arterial system is essentially nondiagnostic.  Would advise a repeat examination.  2. Large left pleural effusion occupying the majority of the chest cavity,  causing collapse of the left lung.  Only a small portion of the left upper  lobe is aerated.  3. Ground-glass opacification at the right lung apex might reflect  infectious/inflammatory process or scarring.  4. Sclerotic foci in the T10 and T11 vertebral bodies, cannot exclude  metastatic disease.              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

## 2024-01-01 NOTE — PROGRESS NOTES
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  He has no fever, chills, rigors he continues to endorse exertional dyspnea.  Patient continues to be on ceftriaxone and doxycycline.  Left-sided chest tube was inserted 12/31/23 due to persistent left hemothorax opacification and increased work of breathing.  Patient had over 3 L of chest tube output.  Chest tube was unclamped this morning and was put on gravity.  He is awake and interactive.  He continues to endorse exertional dyspnea and has been requiring supplemental oxygen.    MEDICATIONS:   docusate sodium  100 mg Oral Daily    urea  15 g Oral Daily    ipratropium 0.5 mg-albuterol 2.5 mg  1 Dose Inhalation Q4H RT    cefTRIAXone (ROCEPHIN) IV  2,000 mg IntraVENous Q24H    doxycycline (VIBRAMYCIN) IV  100 mg IntraVENous Q12H    propranolol  20 mg Oral TID    sodium chloride flush  5-40 mL IntraVENous 2 times per day    pantoprazole  40 mg Oral QAM AC    enoxaparin  40 mg SubCUTAneous Daily      sodium chloride       polyethylene glycol, magnesium sulfate, sodium phosphate 14.37 mmol in sodium chloride 0.9 % 250 mL IVPB **OR** sodium phosphate 28.77 mmol in sodium chloride 0.9 % 500 mL IVPB, potassium chloride **OR** potassium alternative oral replacement **OR** potassium chloride, perflutren lipid microspheres, sodium chloride flush, sodium chloride      REVIEW OF SYSTEMS:  Constitutional: Denies fever, weight loss, night sweats, and fatigue  Skin: Denies pigmentation, dark lesions, and rashes   HEENT: Denies hearing loss, tinnitus, ear drainage, epistaxis, sore throat, and hoarseness.  Cardiovascular: Denies palpitations, chest pain, and chest pressure.  Respiratory: Denies

## 2024-01-01 NOTE — PLAN OF CARE
Problem: Discharge Planning  Goal: Discharge to home or other facility with appropriate resources  Outcome: Progressing     Problem: Skin/Tissue Integrity  Goal: Absence of new skin breakdown  Description: 1.  Monitor for areas of redness and/or skin breakdown  2.  Assess vascular access sites hourly  3.  Every 4-6 hours minimum:  Change oxygen saturation probe site  4.  Every 4-6 hours:  If on nasal continuous positive airway pressure, respiratory therapy assess nares and determine need for appliance change or resting period.  1/1/2024 0121 by Isabelle Blevins RN  Outcome: Progressing  12/31/2023 1615 by Logan Vitale RN  Outcome: Progressing     Problem: Respiratory - Adult  Goal: Achieves optimal ventilation and oxygenation  1/1/2024 0121 by Isabelle Blevins RN  Outcome: Progressing  12/31/2023 1615 by Logan Vitale RN  Outcome: Progressing     Problem: Metabolic/Fluid and Electrolytes - Adult  Goal: Electrolytes maintained within normal limits  1/1/2024 0121 by Isabelle Blevins RN  Outcome: Progressing  12/31/2023 1615 by Logan Vitale RN  Outcome: Progressing     Problem: Pain  Goal: Verbalizes/displays adequate comfort level or baseline comfort level  1/1/2024 0121 by Isabelle Blevins RN  Outcome: Progressing  Flowsheets (Taken 12/31/2023 2000)  Verbalizes/displays adequate comfort level or baseline comfort level:   Encourage patient to monitor pain and request assistance   Assess pain using appropriate pain scale   Administer analgesics based on type and severity of pain and evaluate response  12/31/2023 1615 by Logan Vitale RN  Outcome: Progressing     Problem: Safety - Adult  Goal: Free from fall injury  1/1/2024 0121 by Isabelle Blevins RN  Outcome: Progressing  Flowsheets (Taken 1/1/2024 0117)  Free From Fall Injury: Instruct family/caregiver on patient safety  12/31/2023 1615 by Logan Vitale RN  Outcome: Progressing

## 2024-01-02 ENCOUNTER — APPOINTMENT (OUTPATIENT)
Dept: GENERAL RADIOLOGY | Age: 79
DRG: 180 | End: 2024-01-02
Payer: MEDICARE

## 2024-01-02 LAB
ANION GAP SERPL CALCULATED.3IONS-SCNC: 10 MMOL/L (ref 7–16)
BASOPHILS # BLD: 0.02 K/UL (ref 0–0.2)
BASOPHILS NFR BLD: 0 % (ref 0–2)
BUN SERPL-MCNC: 21 MG/DL (ref 6–23)
CALCIUM SERPL-MCNC: 8.7 MG/DL (ref 8.6–10.2)
CHLORIDE SERPL-SCNC: 97 MMOL/L (ref 98–107)
CO2 SERPL-SCNC: 27 MMOL/L (ref 22–29)
CREAT SERPL-MCNC: 0.8 MG/DL (ref 0.7–1.2)
EOSINOPHIL # BLD: 0.1 K/UL (ref 0.05–0.5)
EOSINOPHILS RELATIVE PERCENT: 1 % (ref 0–6)
ERYTHROCYTE [DISTWIDTH] IN BLOOD BY AUTOMATED COUNT: 12.4 % (ref 11.5–15)
GFR SERPL CREATININE-BSD FRML MDRD: >60 ML/MIN/1.73M2
GLUCOSE SERPL-MCNC: 97 MG/DL (ref 74–99)
HCT VFR BLD AUTO: 36.4 % (ref 37–54)
HGB BLD-MCNC: 12.4 G/DL (ref 12.5–16.5)
IMM GRANULOCYTES # BLD AUTO: 0.06 K/UL (ref 0–0.58)
IMM GRANULOCYTES NFR BLD: 1 % (ref 0–5)
LYMPHOCYTES NFR BLD: 1.58 K/UL (ref 1.5–4)
LYMPHOCYTES RELATIVE PERCENT: 19 % (ref 20–42)
MAGNESIUM SERPL-MCNC: 2.2 MG/DL (ref 1.6–2.6)
MCH RBC QN AUTO: 31.6 PG (ref 26–35)
MCHC RBC AUTO-ENTMCNC: 34.1 G/DL (ref 32–34.5)
MCV RBC AUTO: 92.6 FL (ref 80–99.9)
MONOCYTES NFR BLD: 1.01 K/UL (ref 0.1–0.95)
MONOCYTES NFR BLD: 12 % (ref 2–12)
NEUTROPHILS NFR BLD: 67 % (ref 43–80)
NEUTS SEG NFR BLD: 5.64 K/UL (ref 1.8–7.3)
PHOSPHATE SERPL-MCNC: 3.3 MG/DL (ref 2.5–4.5)
PLATELET # BLD AUTO: 186 K/UL (ref 130–450)
PMV BLD AUTO: 9 FL (ref 7–12)
POTASSIUM SERPL-SCNC: 3.7 MMOL/L (ref 3.5–5)
RBC # BLD AUTO: 3.93 M/UL (ref 3.8–5.8)
SODIUM SERPL-SCNC: 134 MMOL/L (ref 132–146)
WBC OTHER # BLD: 8.4 K/UL (ref 4.5–11.5)

## 2024-01-02 PROCEDURE — 6370000000 HC RX 637 (ALT 250 FOR IP): Performed by: INTERNAL MEDICINE

## 2024-01-02 PROCEDURE — 97110 THERAPEUTIC EXERCISES: CPT | Performed by: PHYSICAL THERAPIST

## 2024-01-02 PROCEDURE — 2060000000 HC ICU INTERMEDIATE R&B

## 2024-01-02 PROCEDURE — 51702 INSERT TEMP BLADDER CATH: CPT

## 2024-01-02 PROCEDURE — 71045 X-RAY EXAM CHEST 1 VIEW: CPT

## 2024-01-02 PROCEDURE — 2580000003 HC RX 258

## 2024-01-02 PROCEDURE — 80048 BASIC METABOLIC PNL TOTAL CA: CPT

## 2024-01-02 PROCEDURE — 6360000002 HC RX W HCPCS: Performed by: INTERNAL MEDICINE

## 2024-01-02 PROCEDURE — 6370000000 HC RX 637 (ALT 250 FOR IP): Performed by: STUDENT IN AN ORGANIZED HEALTH CARE EDUCATION/TRAINING PROGRAM

## 2024-01-02 PROCEDURE — 84100 ASSAY OF PHOSPHORUS: CPT

## 2024-01-02 PROCEDURE — 2700000000 HC OXYGEN THERAPY PER DAY

## 2024-01-02 PROCEDURE — 36415 COLL VENOUS BLD VENIPUNCTURE: CPT

## 2024-01-02 PROCEDURE — 2500000003 HC RX 250 WO HCPCS: Performed by: INTERNAL MEDICINE

## 2024-01-02 PROCEDURE — 99233 SBSQ HOSP IP/OBS HIGH 50: CPT | Performed by: INTERNAL MEDICINE

## 2024-01-02 PROCEDURE — 97161 PT EVAL LOW COMPLEX 20 MIN: CPT | Performed by: PHYSICAL THERAPIST

## 2024-01-02 PROCEDURE — 85025 COMPLETE CBC W/AUTO DIFF WBC: CPT

## 2024-01-02 PROCEDURE — 6370000000 HC RX 637 (ALT 250 FOR IP)

## 2024-01-02 PROCEDURE — 94640 AIRWAY INHALATION TREATMENT: CPT

## 2024-01-02 PROCEDURE — 83735 ASSAY OF MAGNESIUM: CPT

## 2024-01-02 PROCEDURE — 2580000003 HC RX 258: Performed by: INTERNAL MEDICINE

## 2024-01-02 RX ADMIN — IPRATROPIUM BROMIDE AND ALBUTEROL SULFATE 1 DOSE: .5; 2.5 SOLUTION RESPIRATORY (INHALATION) at 07:00

## 2024-01-02 RX ADMIN — DOXYCYCLINE 100 MG: 100 INJECTION, POWDER, LYOPHILIZED, FOR SOLUTION INTRAVENOUS at 11:29

## 2024-01-02 RX ADMIN — Medication 10 ML: at 08:23

## 2024-01-02 RX ADMIN — CEFTRIAXONE SODIUM 2000 MG: 2 INJECTION, POWDER, FOR SOLUTION INTRAMUSCULAR; INTRAVENOUS at 22:47

## 2024-01-02 RX ADMIN — ENOXAPARIN SODIUM 40 MG: 100 INJECTION SUBCUTANEOUS at 08:22

## 2024-01-02 RX ADMIN — PROPRANOLOL HYDROCHLORIDE 20 MG: 10 TABLET ORAL at 08:22

## 2024-01-02 RX ADMIN — PROPRANOLOL HYDROCHLORIDE 20 MG: 10 TABLET ORAL at 21:11

## 2024-01-02 RX ADMIN — IPRATROPIUM BROMIDE AND ALBUTEROL SULFATE 1 DOSE: .5; 2.5 SOLUTION RESPIRATORY (INHALATION) at 10:52

## 2024-01-02 RX ADMIN — IPRATROPIUM BROMIDE AND ALBUTEROL SULFATE 1 DOSE: .5; 2.5 SOLUTION RESPIRATORY (INHALATION) at 01:24

## 2024-01-02 RX ADMIN — Medication 10 ML: at 22:48

## 2024-01-02 RX ADMIN — PROPRANOLOL HYDROCHLORIDE 20 MG: 10 TABLET ORAL at 14:54

## 2024-01-02 RX ADMIN — IPRATROPIUM BROMIDE AND ALBUTEROL SULFATE 1 DOSE: .5; 2.5 SOLUTION RESPIRATORY (INHALATION) at 18:19

## 2024-01-02 RX ADMIN — PANTOPRAZOLE SODIUM 40 MG: 40 TABLET, DELAYED RELEASE ORAL at 08:22

## 2024-01-02 RX ADMIN — DOCUSATE SODIUM 100 MG: 100 CAPSULE, LIQUID FILLED ORAL at 08:23

## 2024-01-02 RX ADMIN — KETOROLAC TROMETHAMINE 15 MG: 15 INJECTION, SOLUTION INTRAMUSCULAR; INTRAVENOUS at 22:48

## 2024-01-02 RX ADMIN — Medication 15 G: at 08:23

## 2024-01-02 RX ADMIN — DOXYCYCLINE 100 MG: 100 INJECTION, POWDER, LYOPHILIZED, FOR SOLUTION INTRAVENOUS at 22:47

## 2024-01-02 ASSESSMENT — PAIN DESCRIPTION - ORIENTATION: ORIENTATION: LEFT;POSTERIOR

## 2024-01-02 ASSESSMENT — PAIN DESCRIPTION - PAIN TYPE: TYPE: ACUTE PAIN;SURGICAL PAIN

## 2024-01-02 ASSESSMENT — PAIN DESCRIPTION - LOCATION: LOCATION: INCISION

## 2024-01-02 ASSESSMENT — PAIN - FUNCTIONAL ASSESSMENT: PAIN_FUNCTIONAL_ASSESSMENT: ACTIVITIES ARE NOT PREVENTED

## 2024-01-02 ASSESSMENT — PAIN DESCRIPTION - FREQUENCY: FREQUENCY: INTERMITTENT

## 2024-01-02 ASSESSMENT — PAIN SCALES - GENERAL
PAINLEVEL_OUTOF10: 0
PAINLEVEL_OUTOF10: 2

## 2024-01-02 ASSESSMENT — PAIN DESCRIPTION - DESCRIPTORS: DESCRIPTORS: SORE

## 2024-01-02 NOTE — PROGRESS NOTES
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  He has no fever, chills, rigors.  Chest x-ray showed residual pleural effusion and small left-sided pneumothorax versus entrapped lung.  Chest tube remains unclamped and was connected to suction.  Patient had around 250 mL of chest tube output.    MEDICATIONS:   doxycycline (VIBRAMYCIN) IV  100 mg IntraVENous Q12H    cefTRIAXone (ROCEPHIN) IV  2,000 mg IntraVENous Q24H    docusate sodium  100 mg Oral Daily    urea  15 g Oral Daily    ipratropium 0.5 mg-albuterol 2.5 mg  1 Dose Inhalation Q4H RT    propranolol  20 mg Oral TID    sodium chloride flush  5-40 mL IntraVENous 2 times per day    pantoprazole  40 mg Oral QAM AC    enoxaparin  40 mg SubCUTAneous Daily      sodium chloride       ketorolac, polyethylene glycol, magnesium sulfate, sodium phosphate 14.37 mmol in sodium chloride 0.9 % 250 mL IVPB **OR** sodium phosphate 28.77 mmol in sodium chloride 0.9 % 500 mL IVPB, potassium chloride **OR** potassium alternative oral replacement **OR** potassium chloride, perflutren lipid microspheres, sodium chloride flush, sodium chloride      REVIEW OF SYSTEMS:  Constitutional: Denies fever, weight loss, night sweats, and fatigue  Skin: Denies pigmentation, dark lesions, and rashes   HEENT: Denies hearing loss, tinnitus, ear drainage, epistaxis, sore throat, and hoarseness.  Cardiovascular: Denies palpitations, chest pain, and chest pressure.  Respiratory: Denies cough, dyspnea at rest, hemoptysis, apnea, and choking.  Gastrointestinal: Denies nausea, vomiting, poor appetite, diarrhea, heartburn or reflux  Genitourinary: Denies dysuria, frequency, urgency or hematuria  Musculoskeletal:

## 2024-01-02 NOTE — PROGRESS NOTES
Vascular duplex lower extremity venous bilateral    Result Date: 12/28/2023  EXAMINATION: DUPLEX VENOUS ULTRASOUND OF THE BILATERAL LOWER [id number]/28/2023 9:08 pm TECHNIQUE: Duplex ultrasound using B-mode/gray scaled imaging and Doppler spectral analysis and color flow was obtained of the deep venous structures of the bilateral extremities. COMPARISON: None. HISTORY: ORDERING SYSTEM PROVIDED HISTORY: LEG SWELLING, PAIN, DVT SUSPECTED FINDINGS: The visualized veins of the bilateral lower extremities are patent and free of echogenic thrombus. The veins demonstrate good compressibility with normal color flow study and spectral analysis.     No evidence of DVT in either lower extremity.         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    1/1/2024  Awake and very interactive and main issue is dyspnea on minimal exertion  No fever or chills.  Continues on ceftriaxone and doxycycline.  Left-sided chest tube was inserted yesterday with over 3 L of output chest tube was put on gravity this morning.  Pleural fluid was exudative with high total proteins and markedly elevated LDH consistent with malignant effusion  Cytology pending  Will need additional thoracentesis for palliation of dyspnea and may need chest  tube or Pleurx and critical care consulted     Hyponatremia likely related to SIADH from malignancy  CT scan of abdomen and pelvis showed multiple mixed lytic and sclerotic lesions involving bilateral femurs iliac bones pubic bones and spine consistent with metastatic disease.  Right benign-appearing renal cyst noted.  No evidence of visceral intra-abdominal metastasis.    PSA elevated with bone scan showing widespread skeletal metastasis with elevated alkaline phosphatase    Rule out metastatic prostate cancer  Rule out lung cancer with pleural effusion and bony metastasis    1/2/24  - Breathing and overall clinical picture slowly improving  - Diffusely metastatic process as above  - Pending cytology from pleural fluid  - PSA was 20.9  - Chest tube draining to gravity   - RSV  - Pending cytology, may require biopsy of bone. Once diagnosis is finalized, systemic therapy options will be addressed  - Will follow    Art Lara MD  Electronically signed 1/2/2024 at 1:14 PM

## 2024-01-02 NOTE — PROGRESS NOTES
Physical Therapy    Physical Therapy Initial Evaluation/Plan of Care    Room #:  0537/0537-02  Patient Name: Wilmer Fuentes  YOB: 1945  MRN: [id number]    Date of Service: 1/2/2024     Tentative placement recommendation: Home with Home Health Physical Therapy versus subacute if goals not met  Equipment recommendation: Patient has needed equipment       Evaluating Physical Therapist: Rey Donaldson, PT  #[id number]      Specific Provider Orders/Date/Referring Provider :     PT eval and treat  Start:  12/30/23 1045,   End:  12/30/23 1045,   ONE TIME,   Standing Count:  1 Occurrences,   R       Brown Bennett MD    Admitting Diagnosis:   Hyponatremia [E87.1]  Pleural effusion [J90]    Admitted with    cough, shortness of breath , pleural effusion, bilateral lower extremities swelling  Surgery: none  Visit Diagnoses         Codes    Postprocedural pneumothorax     J95.811            Patient Active Problem List   Diagnosis    Hyponatremia    Metastatic cancer (HCC)    Pleural effusion        ASSESSMENT of Current Deficits Patient exhibits decreased strength, balance, endurance, and pain right shoulder and chest tube insertion  impairing functional mobility, transfers, gait , gait distance, and tolerance to activity are barriers to d/c and require skilled intervention to address concerns listed above to increase safety and independence at discharge.   Decreased strength, balance and endurance  increases patient's risk for fall.   Patient with difficulty oxygenating impairing endurance and functional independence requiring 2 Liters of o2 via nasal cannula to maintain > 90% po2. Patient is  unsteady with gait using cane during session with no  loss of balance , dizziness, or shortness of breath  Pt requires skilled monitoring of patient's response and vital signs during treatment session.        PHYSICAL THERAPY  PLAN OF CARE       Physical therapy plan of care is established based on physician order,  (46315)  Therapeutic exercises (63692)   10 minutes  1 unit(s)    Rey Donaldson, PT

## 2024-01-02 NOTE — PROGRESS NOTES
Internal Medicine Progress Note    [facility]    Jonnathan Pollock D.O., CICI Hutchins D.O., CICI Arellano D.O.    Joan Rai, MSN, APRN, NP-C  Chavez Harmon, MSN, APRN-CNP  Reid Oliveros, MSN, APRN, NP-C     Primary Care Physician: Todd Cross DO   Admitting Physician:  Jonnathan Pollock DO  Admission date and time: 12/28/2023  8:08 PM    Room:  [address] [place] [address]  Admitting diagnosis: Hyponatremia [E87.1]  Pleural effusion [J90]    Patient Name: Wilmer Fuentes  MRN: [id number]    Date of Service: 1/2/2024     Subjective:  Wilmer is a 78 y.o. male who was seen and examined today,1/2/2024, at the bedside. Wilmer was transferred from the intensive care unit yesterday and is resting comfortably on the [facility] floor.  Chest tube remains in place and is draining to gravity.  Shortness of breath continues to improve with plans for repeat chest x-ray this morning.  We continue to await pleural fluid cytology.  Multiple subspecialist continue to follow.  No family members were present during my examination    Review of System:   Constitutional:   Stable malaise and fatigue.  HEENT:   Denies ear pain, sore throat, sinus or eye problems.  Nasal cannula oxygen is in place.  Cardiovascular:   Denies any chest pain, irregular heartbeats, or palpitations.   Respiratory:   Improving shortness of breath.  Less orthopnea overall.  Gastrointestinal:   Decreased appetite and therefore decreased oral intake.    Genitourinary:    Denies any urgency, frequency, hematuria. Voiding without difficulty.  Extremities:   Denies lower extremity swelling, edema or cyanosis.   Neurology:    Denies any headache or focal neurological deficits, ongoing weakness and deconditioning.  Psch:   Denies being anxious or depressed.  Musculoskeletal:    Denies  myalgias, joint complaints or back pain.   Integumentary:   Denies any rashes, ulcers, or excoriations.  Denies  respiratory status continues to improve with ongoing pleural fluid evacuation.  We continue to await pleural fluid cytology.  We appreciate the input from the pulmonary and nephrology teams.  Hyponatremia remained stable and we appreciate the input from the nephrology team.  He will become increasingly more active today.  Further chest tube management as per the pulmonary team.  No family members were present during my examination.    More than 50% of my  time was spent at the bedside counseling/coordinating care with the patient and/or family with face to face contact.  This time was spent reviewing notes and laboratory data as well as instructing and counseling the patient. Time I spent with the family or surrogate(s) is included only if the patient was incapable of providing the necessary information or participating in medical decisions. I also discussed the differential diagnosis and all of the proposed management plans with the patient and individuals accompanying the patient.    Wilmer requires this high level of physician care and nursing in the ICU due to the complexity of decision management and chance of rapid decline or death.  Continued cardiac monitoring and higher level of nursing are required. I am readily available for any further decision-making and intervention.     Primitivo Hutchins DO, F.A.C.O.I.  1/2/2024  11:24 AM

## 2024-01-03 ENCOUNTER — APPOINTMENT (OUTPATIENT)
Dept: CT IMAGING | Age: 79
DRG: 180 | End: 2024-01-03
Payer: MEDICARE

## 2024-01-03 LAB
ANION GAP SERPL CALCULATED.3IONS-SCNC: 8 MMOL/L (ref 7–16)
BASOPHILS # BLD: 0.02 K/UL (ref 0–0.2)
BASOPHILS NFR BLD: 0 % (ref 0–2)
BUN SERPL-MCNC: 20 MG/DL (ref 6–23)
CALCIUM SERPL-MCNC: 8.4 MG/DL (ref 8.6–10.2)
CANCER AG19-9 SERPL IA-ACNC: 644 U/ML (ref 0–35)
CHLORIDE SERPL-SCNC: 100 MMOL/L (ref 98–107)
CO2 SERPL-SCNC: 30 MMOL/L (ref 22–29)
CREAT SERPL-MCNC: 0.8 MG/DL (ref 0.7–1.2)
EOSINOPHIL # BLD: 0.31 K/UL (ref 0.05–0.5)
EOSINOPHILS RELATIVE PERCENT: 4 % (ref 0–6)
ERYTHROCYTE [DISTWIDTH] IN BLOOD BY AUTOMATED COUNT: 12.6 % (ref 11.5–15)
GFR SERPL CREATININE-BSD FRML MDRD: >60 ML/MIN/1.73M2
GLUCOSE SERPL-MCNC: 93 MG/DL (ref 74–99)
HCT VFR BLD AUTO: 36.1 % (ref 37–54)
HGB BLD-MCNC: 12.2 G/DL (ref 12.5–16.5)
IMM GRANULOCYTES # BLD AUTO: 0.06 K/UL (ref 0–0.58)
IMM GRANULOCYTES NFR BLD: 1 % (ref 0–5)
LYMPHOCYTES NFR BLD: 1.87 K/UL (ref 1.5–4)
LYMPHOCYTES RELATIVE PERCENT: 21 % (ref 20–42)
MCH RBC QN AUTO: 32.3 PG (ref 26–35)
MCHC RBC AUTO-ENTMCNC: 33.8 G/DL (ref 32–34.5)
MCV RBC AUTO: 95.5 FL (ref 80–99.9)
MICROORGANISM SPEC CULT: NO GROWTH
MICROORGANISM SPEC CULT: NORMAL
MICROORGANISM SPEC CULT: NORMAL
MICROORGANISM/AGENT SPEC: NORMAL
MONOCYTES NFR BLD: 0.95 K/UL (ref 0.1–0.95)
MONOCYTES NFR BLD: 11 % (ref 2–12)
NEUTROPHILS NFR BLD: 64 % (ref 43–80)
NEUTS SEG NFR BLD: 5.73 K/UL (ref 1.8–7.3)
PLATELET # BLD AUTO: 180 K/UL (ref 130–450)
PMV BLD AUTO: 9.1 FL (ref 7–12)
POTASSIUM SERPL-SCNC: 3.9 MMOL/L (ref 3.5–5)
RBC # BLD AUTO: 3.78 M/UL (ref 3.8–5.8)
SERVICE CMNT-IMP: NORMAL
SERVICE CMNT-IMP: NORMAL
SODIUM SERPL-SCNC: 138 MMOL/L (ref 132–146)
SPECIMEN DESCRIPTION: NORMAL
WBC OTHER # BLD: 8.9 K/UL (ref 4.5–11.5)

## 2024-01-03 PROCEDURE — 85025 COMPLETE CBC W/AUTO DIFF WBC: CPT

## 2024-01-03 PROCEDURE — 6370000000 HC RX 637 (ALT 250 FOR IP)

## 2024-01-03 PROCEDURE — 2580000003 HC RX 258: Performed by: INTERNAL MEDICINE

## 2024-01-03 PROCEDURE — 71250 CT THORAX DX C-: CPT

## 2024-01-03 PROCEDURE — 36415 COLL VENOUS BLD VENIPUNCTURE: CPT

## 2024-01-03 PROCEDURE — 97110 THERAPEUTIC EXERCISES: CPT

## 2024-01-03 PROCEDURE — 80048 BASIC METABOLIC PNL TOTAL CA: CPT

## 2024-01-03 PROCEDURE — 2580000003 HC RX 258

## 2024-01-03 PROCEDURE — 6360000002 HC RX W HCPCS: Performed by: INTERNAL MEDICINE

## 2024-01-03 PROCEDURE — 6370000000 HC RX 637 (ALT 250 FOR IP): Performed by: INTERNAL MEDICINE

## 2024-01-03 PROCEDURE — 2700000000 HC OXYGEN THERAPY PER DAY

## 2024-01-03 PROCEDURE — 99233 SBSQ HOSP IP/OBS HIGH 50: CPT | Performed by: INTERNAL MEDICINE

## 2024-01-03 PROCEDURE — 2500000003 HC RX 250 WO HCPCS: Performed by: INTERNAL MEDICINE

## 2024-01-03 PROCEDURE — 2060000000 HC ICU INTERMEDIATE R&B

## 2024-01-03 PROCEDURE — 94640 AIRWAY INHALATION TREATMENT: CPT

## 2024-01-03 RX ORDER — IPRATROPIUM BROMIDE AND ALBUTEROL SULFATE 2.5; .5 MG/3ML; MG/3ML
1 SOLUTION RESPIRATORY (INHALATION) EVERY 4 HOURS PRN
Status: DISCONTINUED | OUTPATIENT
Start: 2024-01-03 | End: 2024-01-11 | Stop reason: HOSPADM

## 2024-01-03 RX ADMIN — CEFTRIAXONE SODIUM 2000 MG: 2 INJECTION, POWDER, FOR SOLUTION INTRAMUSCULAR; INTRAVENOUS at 22:46

## 2024-01-03 RX ADMIN — DOXYCYCLINE 100 MG: 100 INJECTION, POWDER, LYOPHILIZED, FOR SOLUTION INTRAVENOUS at 21:01

## 2024-01-03 RX ADMIN — Medication 10 ML: at 22:09

## 2024-01-03 RX ADMIN — ENOXAPARIN SODIUM 40 MG: 100 INJECTION SUBCUTANEOUS at 09:15

## 2024-01-03 RX ADMIN — DOCUSATE SODIUM 100 MG: 100 CAPSULE, LIQUID FILLED ORAL at 09:15

## 2024-01-03 RX ADMIN — PROPRANOLOL HYDROCHLORIDE 20 MG: 10 TABLET ORAL at 09:15

## 2024-01-03 RX ADMIN — PANTOPRAZOLE SODIUM 40 MG: 40 TABLET, DELAYED RELEASE ORAL at 05:52

## 2024-01-03 RX ADMIN — IPRATROPIUM BROMIDE AND ALBUTEROL SULFATE 1 DOSE: .5; 2.5 SOLUTION RESPIRATORY (INHALATION) at 01:35

## 2024-01-03 RX ADMIN — Medication 10 ML: at 09:15

## 2024-01-03 RX ADMIN — DOXYCYCLINE 100 MG: 100 INJECTION, POWDER, LYOPHILIZED, FOR SOLUTION INTRAVENOUS at 09:18

## 2024-01-03 RX ADMIN — PROPRANOLOL HYDROCHLORIDE 20 MG: 10 TABLET ORAL at 15:31

## 2024-01-03 RX ADMIN — IPRATROPIUM BROMIDE AND ALBUTEROL SULFATE 1 DOSE: .5; 2.5 SOLUTION RESPIRATORY (INHALATION) at 06:39

## 2024-01-03 RX ADMIN — PROPRANOLOL HYDROCHLORIDE 20 MG: 10 TABLET ORAL at 21:03

## 2024-01-03 ASSESSMENT — PAIN SCALES - GENERAL
PAINLEVEL_OUTOF10: 0
PAINLEVEL_OUTOF10: 0

## 2024-01-03 NOTE — PLAN OF CARE
Problem: Discharge Planning  Goal: Discharge to home or other facility with appropriate resources  Outcome: Progressing     Problem: Skin/Tissue Integrity  Goal: Absence of new skin breakdown  Description: 1.  Monitor for areas of redness and/or skin breakdown  2.  Assess vascular access sites hourly  3.  Every 4-6 hours minimum:  Change oxygen saturation probe site  4.  Every 4-6 hours:  If on nasal continuous positive airway pressure, respiratory therapy assess nares and determine need for appliance change or resting period.  Outcome: Progressing     Problem: Respiratory - Adult  Goal: Achieves optimal ventilation and oxygenation  Outcome: Progressing     Problem: Metabolic/Fluid and Electrolytes - Adult  Goal: Electrolytes maintained within normal limits  Outcome: Progressing     Problem: Pain  Goal: Verbalizes/displays adequate comfort level or baseline comfort level  Outcome: Progressing  Flowsheets (Taken 1/3/2024 0848)  Verbalizes/displays adequate comfort level or baseline comfort level: Encourage patient to monitor pain and request assistance     Problem: Safety - Adult  Goal: Free from fall injury  Outcome: Progressing  Flowsheets (Taken 1/3/2024 0848)  Free From Fall Injury: Instruct family/caregiver on patient safety

## 2024-01-03 NOTE — PROGRESS NOTES
OCCUPATIONAL THERAPY BEDSIDE TREATMENT NOTE  [facility]  [address] [place]. [place]    Date:1/3/2024  Patient Name: Wilmer Fuentes (radha Oh-zebaldemar)  MRN: [id number]  : 1945  Room: [address] [place] [address]        Evaluating OT: Allyson Meza, OTR/L; [id number]        Referring Provider and Orders/Date:        OT eval and treat  Start:  23,   End:  23,   ONE TIME,   Standing Count:  1 Occurrences   R       Brown Bennett MD     Recommended placement: home with  and family care        Diagnosis:   1. Hyponatremia    2. Pleural effusion    3. Postprocedural pneumothorax          Surgery: : chest tube; 23: thoracentesis       Pertinent Medical History:        Past Medical History        Past Medical History:   Diagnosis Date    Anxiety      Hyperlipidemia      Hypertension               Past Surgical History         Past Surgical History:   Procedure Laterality Date    ADRENALECTOMY        COLONOSCOPY        PROSTATE SURGERY        UPPER GASTROINTESTINAL ENDOSCOPY               Precautions:  Fall Risk, Contact/droplet RSV, chest tube, 3L, mcnamara        Assessment of current deficits     [x] Functional mobility           [x]ADLs           [x] Strength                   []Cognition     [x] Functional transfers          [x] IADLs          [x] Safety Awareness   [x]Endurance     [] Fine Coordination                         [x] Balance      [] Vision/perception    []Sensation       [x]Gross Motor Coordination             [] ROM           [] Delirium                   [] Motor Control      OT PLAN OF CARE   OT POC based on physician orders, patient diagnosis and results of clinical assessment     Frequency/Duration 1-3 days/wk for 2 weeks PRN   Specific OT Treatment Interventions to include:   * Instruction/training on adapted ADL techniques and AE recommendations to increase functional independence within precautions      strength/ROM and endurance required for functional transfers and ADL participation.     Pt has made fair progress towards set goals     OT 1-3 days/wk for 2 weeks PRN     Treatment Time also includes thorough review of current medical information, gathering information on past medical history/social history and prior level of function, informal observation of tasks, assessment of data and education on plan of care and goals.    Treatment Time In: 3:19 PM     Treatment Time Out: 3:34 PM           Treatment Charges: Mins Units   ADL/Home Mgt     [id number]       Thera Activities     [id number]   5 0   Ther Ex                 [id number] 10 1   Manual Therapy    [id number]     Neuro Re-ed         08184     Orthotic manage/training                               [id number]     Non Billable Time     Total Timed Treatment 15 1        PAOLO Hathaway/ALONSO #[id number]

## 2024-01-03 NOTE — RT PROTOCOL NOTE
RT Nebulizer Bronchodilator Protocol Note    There is a bronchodilator order in the chart from a provider indicating to follow the RT Bronchodilator Protocol and there is an “Initiate RT Bronchodilator Protocol” order as well (see protocol at bottom of note).    CXR Findings:  XR CHEST PORTABLE    Result Date: 1/2/2024  Slightly diminished left-sided pneumothorax presently in the range of 15-20%     XR CHEST PORTABLE    Result Date: 1/2/2024  Since the study of a January 1st at 05:44 a.m. there is now drainage of left-sided pleural effusion and there is development of a left-sided pneumothorax of approximately 20%.       The findings from the last RT Protocol Assessment were as follows:  Smoking: None or smoker <15 pack years  Respiratory Pattern: Regular pattern and RR 12-20 bpm  Breath Sounds: Slightly diminished and/or crackles  Cough: Strong, spontaneous, non-productive  Indication for Bronchodilator Therapy:    Bronchodilator Assessment Score: 2    Aerosolized bronchodilator medication orders have been revised according to the RT Nebulizer Bronchodilator Protocol below.    Respiratory Therapist to perform RT Therapy Protocol Assessment initially then follow the protocol.  Repeat RT Therapy Protocol Assessment PRN for score 0-3 or on second treatment, BID, and PRN for scores above 3.    No Indications - adjust the frequency to every 6 hours PRN wheezing or bronchospasm, if no treatments needed after 48 hours then discontinue using Per Protocol order mode.     If indication present, adjust the RT bronchodilator orders based on the Bronchodilator Assessment Score as indicated below.  If a patient is on this medication at home then do not decrease Frequency below that used at home.    0-3 - enter or revise RT bronchodilator order(s) to equivalent RT Bronchodilator order with Frequency of every 4 hours PRN for wheezing or increased work of breathing using Per Protocol order mode.       4-6 - enter or revise RT

## 2024-01-03 NOTE — PROGRESS NOTES
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  He has no fever, chills, rigors.  Patient continues to have significant output from left-sided chest tube.  He had around 500 cc of chest tube output yesterday.  CT scan of the chest showed finding consistent with entrapped lung.  Cytology still pending.    MEDICATIONS:   doxycycline (VIBRAMYCIN) IV  100 mg IntraVENous Q12H    cefTRIAXone (ROCEPHIN) IV  2,000 mg IntraVENous Q24H    docusate sodium  100 mg Oral Daily    urea  15 g Oral Daily    propranolol  20 mg Oral TID    sodium chloride flush  5-40 mL IntraVENous 2 times per day    pantoprazole  40 mg Oral QAM AC    enoxaparin  40 mg SubCUTAneous Daily      sodium chloride       ipratropium 0.5 mg-albuterol 2.5 mg, ketorolac, polyethylene glycol, magnesium sulfate, sodium phosphate 14.37 mmol in sodium chloride 0.9 % 250 mL IVPB **OR** sodium phosphate 28.77 mmol in sodium chloride 0.9 % 500 mL IVPB, potassium chloride **OR** potassium alternative oral replacement **OR** potassium chloride, perflutren lipid microspheres, sodium chloride flush, sodium chloride      REVIEW OF SYSTEMS:  Constitutional: Denies fever, weight loss, night sweats, and fatigue  Skin: Denies pigmentation, dark lesions, and rashes   HEENT: Denies hearing loss, tinnitus, ear drainage, epistaxis, sore throat, and hoarseness.  Cardiovascular: Denies palpitations, chest pain, and chest pressure.  Respiratory: Denies cough, dyspnea at rest, hemoptysis, apnea, and choking.  Gastrointestinal: Denies nausea, vomiting, poor appetite, diarrhea, heartburn or reflux  Genitourinary: Denies dysuria, frequency, urgency or hematuria  Musculoskeletal: Denies myalgias,  disease.         XR CHEST PORTABLE   Final Result   Stable chest radiograph with large left pleural effusion and opacity   involving nearly the entire lung.         XR CHEST (2 VW)   Final Result   1. Massive left pleural effusion with dense consolidation of the majority of   the left lung.   2. Moderate shift of the mediastinum towards the right.   3. Moderately displaced fracture of the posterior-lateral left 8th rib. The   margins are indistinct, and this may be a pathologic fracture.         Vascular duplex lower extremity venous bilateral   Final Result   No evidence of DVT in either lower extremity.         IR GUIDED THORACENTESIS PLEURAL    (Results Pending)   IR INTERVENTIONAL RADIOLOGY PROCEDURE REQUEST    (Results Pending)           PROBLEM LIST:  Principal Problem:    Metastatic cancer (HCC)  Active Problems:    Hyponatremia    Pleural effusion  Resolved Problems:    * No resolved hospital problems. *      IMPRESSION:  Large left pleural effusion causing contralateral shift of the mediastinum to the right  Suspected left-sided and trapped lung versus small pneumo  Acute hypoxic respiratory failure requiring supplemental oxygen.   Hyponatremia most consistent with SIADH  Elevated PSA  Evidence of likely bone metastases  Elevated alkaline phosphatase  Nontraumatic rib fractures, pathological etiology is possible  Elevated troponin possible NSTEMI versus demand ischemia  Hyperlipidemia  Systemic hypertension    PLAN:  Pleural effusion is exudative.  Cytology still pending is  Diet ordered  Continue with ceftriaxone and doxycycline for 5 days  CT abdomen and pelvis / CT head reviewed   Prophylactic DVT treatment  Left-sided chest tube inserted for large pleural effusion.  Continue with chest tube to suction -30.   Nephrology is managing hyponatremia  Monitor blood pressure off amlodipine   As needed Toradol for pain  Chest tube to suction -30  We will consider Pleurx catheter once lung expands.  CT scan of

## 2024-01-03 NOTE — PROGRESS NOTES
Internal Medicine Progress Note    [facility]    Jonnathan Pollock D.O., STACIA.                    Primitivo Hutchins D.O., CICI Arellano D.O.    Joan Rai, MSN, APRN, NP-C  Chavez Harmon, MSN, APRN-CNP  Reid Oliveros, MSN, APRN, NP-C     Primary Care Physician: Todd Cross DO   Admitting Physician:  Jonnathan Pollock DO  Admission date and time: 12/28/2023  8:08 PM    Room:  [address] [place] [address]  Admitting diagnosis: Hyponatremia [E87.1]  Pleural effusion [J90]    Patient Name: Wilmer Fuentes  MRN: [id number]    Date of Service: 1/3/2024     Subjective:  Wilmer is a 78 y.o. male who was seen and examined today,1/3/2024, at the bedside. Wilmer is feeling better overall.  Unfortunately, pathology from pleural fluid sampling is still not available.  We discussed the need for definitive tissue diagnosis with the patient and bone biopsy.  He is agreeable with bone biopsy.  IR procedure will be requested today and I have asked nursing to rate reach out to oncology to determine testing of preference for the sample in regards to pathologic evaluation and staining.  Chest tube remains in place and is draining to gravity.  Shortness of breath continues to improve.  Will discuss the case further with the pulmonary team.  Multiple subspecialist continue to follow.  He is agreeable with Mae catheter removal.  No family members were present during my examination    Review of System:   Constitutional:   Stable malaise and fatigue.  HEENT:   Denies ear pain, sore throat, sinus or eye problems.  Nasal cannula oxygen is in place.  Cardiovascular:   Denies any chest pain, irregular heartbeats, or palpitations.   Respiratory:   Improving shortness of breath.  Less orthopnea overall.  Gastrointestinal:   Decreased appetite and therefore decreased oral intake.    Genitourinary:    Denies any urgency, frequency, hematuria. Voiding without difficulty.  Extremities:   Denies lower

## 2024-01-03 NOTE — CARE COORDINATION
1-3-Cm note: pt has a Left chest tube to 30 cm sx, pt on 3l nc , no home oxygen, PT/OT recommending home health care vs SNF dependent on course of treatment. Pt lives with his wife, was independent pta. Cm following for homegoing needs, pt will require o2 testing closer to dc if still requiring o2. Electronically signed by Dora Miller RN on 1/3/2024 at 3:55 PM

## 2024-01-03 NOTE — PROGRESS NOTES
[facility] Nephrology Progress Note  Patient's Name: Wilmer Fuentes  12:31 PM  1/3/2024    Nephrologist: N/A    Reason for Consult:  hyponatremia  Requesting Physician:  Dr. Pollock    Chief Complaint:  worsening SOB and cough    History Obtained From:  patient, chart    History of Present Illness (12/29 consult):    Wilmer Fuentes is a 78 y.o. male with past medical history of hypertension and hyperlipidemia who presented to the emergency department last evening with worsening cough and shortness of breath.  The symptoms have been going on for 2 to 3 weeks.  Patient stated to the ER physician that he was at [facility] and had an x-ray which showed a pleural effusion, he then came to the emergency room.    Initial labs showed a serum sodium of 120 mmol/L at 8 PM last evening.  Nephrology was consulted for hyponatremia.    Initial medical management included IV Solu-Medrol for the breathing issues, 50 mL an hour of normal saline which had run for about 2 hours this morning, and that was it as the ER spoke with me last night and I recommended a strategy of fluid restriction and ordered labs including serum, urine osmolarity and urine sodium.    Of concern was a CT angiogram and chest x-ray, his CT angiogram showed a large left pleural effusion causing collapse of the left lung.    I saw the patient in the intensive care unit this morning.  He said the cough and trouble breathing and been going on for a couple of weeks.  He also noticed some increased leg swelling.  Endorse some loss of appetite, however denied weight loss.  No falls, no unsteady gait though he does ambulate with a cane at baseline.  No fevers or chills.  No dizziness, headache, lightheadedness, mental status changes.  He was hemodynamically stable with normal blood pressures.  Patient overall is euvolemic.    In terms of medication use, he says he is on lisinopril/hydrochlorothiazide for blood pressure.  He has endorsed NSAID use for mild pain in the  is development of a left-sided   pneumothorax of approximately 20%.         XR CHEST PORTABLE   Final Result   1.  Stable left pleural drainage catheter.      2.  Small  left pleural effusion.         XR CHEST PORTABLE   Final Result   Redemonstration of a left pleural drainage catheter.      Improved opacification of the left hemithorax with residual pleural and   parenchymal disease.         XR CHEST PORTABLE   Final Result   1. Interval placement of a left lung base pigtail chest tube. No pneumothorax   evident.  Decreased rightward deviation of the mediastinum in the interim of   improved appearance despite persistent opacification   2. Persistent total opacification left hemithorax.   3. Gaseous distended bowel underlies the diaphragm.         XR CHEST PORTABLE   Final Result   Stable appearance of the chest compared to 12/30/2023.         CT ABDOMEN PELVIS W IV CONTRAST Additional Contrast? None   Final Result   1. Multiple mixed lytic and sclerotic lesions involving the bilateral femurs,   pubic bones, iliac bones and spine.  Findings are concerning for metastatic   disease.   2. No other areas of metastatic disease identified.         CT HEAD WO CONTRAST   Final Result   No evidence of acute intracranial hemorrhage or mass effect.         XR CHEST PORTABLE   Final Result   Stable appearance of the chest compared to 12/29/2023.         NM BONE SCAN WHOLE BODY   Final Result   Multiple focal areas of increased activity suspicious for metastatic disease.   The most prominent lesions are in the right posterior parietal calvarium, the   thoracolumbar spine, the left posterior iliac wing near the sacroiliac joint,   and the intertrochanteric region of the right hip.         XR CHEST 1 VIEW   Final Result   1. Complete opacification of the left hemithorax, increased.   2. Hypoventilated right lung.   3. No convincing pneumothorax.         CTA PULMONARY W CONTRAST   Final Result   1. Evaluation of the pulmonary

## 2024-01-03 NOTE — PROGRESS NOTES
[facility]  Hematology/Oncology  Consult      Patient Name: Wilmer Fuentes  YOB: 1945  PCP: Todd Cross DO   Referring Provider:      Reason for Consultation:   Chief Complaint   Patient presents with    Shortness of Breath     Was at [facility] today and pcp called pt to go to ed, cough couple weeks, no n/v/d, no fever or chills        History of Present Illness: 78-year-old man with past medical history relevant for hypertension, hyperlipidemia and anxiety hospitalized with progressively worsening dyspnea and leg edema.  No fever or chest pain nausea vomiting or hemoptysis.  His BMP on admission showed hyponatremia.  Serum creatinine was normal.  Hepatic panel with hypoalbuminemia with elevated alk phos with normal transaminases and normal bilirubin.  CBC with a hemoglobin of 13 with normal platelet and neutrophilic leukocytosis related to Solumedrol .  PSA was elevated at 20.9.  CEA was 5.0 with minimally elevated  at 37  Chest x-ray and CTA of the chest showed a large left pleural effusion with partial collapse of left upper lobe.  There was groundglass opacities in the right lung apex suggestive of infectious inflammatory process.  Suspicious sclerotic foci at T10 and T11 vertebral bodies noted which may represent metastatic disease .  Bone scans show multiple focal areas of increased activity suspicious for metastatic disease most prominent in the right posterior calvarium and thoracolumbar spine as well as left posterior iliac wing and right hip intertrochanteric area.  CT abdomen, pelvis and CT head pending  Left-sided thoracentesis performed and fluid was exudative with markedly elevated total proteins and LDH highly consistent with malignant effusion and cytology pending.        Diagnostic Data:     Past Medical History:   Diagnosis Date    Anxiety     Hyperlipidemia     Hypertension        Patient Active Problem List    Diagnosis Date Noted    Pleural effusion 12/31/2023  cavity, causing collapse of the left lung.  Only a small portion of the left upper lobe is aerated. 3. Ground-glass opacification at the right lung apex might reflect infectious/inflammatory process or scarring. 4. Sclerotic foci in the T10 and T11 vertebral bodies, cannot exclude metastatic disease.     XR CHEST PORTABLE    Result Date: 12/29/2023  EXAMINATION: ONE XRAY VIEW OF THE CHEST 12/29/2023 1:14 am COMPARISON: Chest x-ray 12/28/2023 HISTORY: ORDERING SYSTEM PROVIDED HISTORY: pneumonia pleural effusion TECHNOLOGIST PROVIDED HISTORY: Reason for exam:->pneumonia pleural effusion FINDINGS: Similar large left pleural effusion with opacity of nearly the entire lung. Right lung is unchanged.  Cardiomediastinal silhouette is mostly obscured. No new findings.     Stable chest radiograph with large left pleural effusion and opacity involving nearly the entire lung.     XR CHEST (2 VW)    Result Date: 12/28/2023  EXAMINATION: TWO XRAY VIEWS OF THE CHEST 12/28/2023 9:21 pm COMPARISON: None. HISTORY: ORDERING SYSTEM PROVIDED HISTORY: eval pleural effusion TECHNOLOGIST PROVIDED HISTORY: Reason for exam:->eval pleural effusion FINDINGS: There is dense consolidation of the majority of the left lung with residual aeration of the medial left apex from a massive left pleural effusion.  There is moderate shift of the mediastinum towards the right. The right chest is clear. The heart and mediastinum cannot be adequately evaluated because of the extensive density throughout the left hemithorax. There is a moderately displaced fracture of the posterior-lateral left 8th rib.  The margins are indistinct, and this may be a pathologic fracture.     1. Massive left pleural effusion with dense consolidation of the majority of the left lung. 2. Moderate shift of the mediastinum towards the right. 3. Moderately displaced fracture of the posterior-lateral left 8th rib. The margins are indistinct, and this may be a pathologic fracture.

## 2024-01-03 NOTE — PROGRESS NOTES
Physical Therapy  Physical Therapy    Room #:   0517/0517-01    Date: 1/3/2024       Patient Name: Wilmer Fuentes  : 1945      MRN: [id number]     Patient unavailable for physical therapy treatment due to off floor at CT. Will attempt PT treatment at a later time. Thank you.      Blaise Ramos  \[facility] [place] [facility]\""  LIC # [id number]

## 2024-01-04 ENCOUNTER — APPOINTMENT (OUTPATIENT)
Dept: INTERVENTIONAL RADIOLOGY/VASCULAR | Age: 79
DRG: 180 | End: 2024-01-04
Payer: MEDICARE

## 2024-01-04 ENCOUNTER — APPOINTMENT (OUTPATIENT)
Dept: GENERAL RADIOLOGY | Age: 79
DRG: 180 | End: 2024-01-04
Payer: MEDICARE

## 2024-01-04 LAB
ANION GAP SERPL CALCULATED.3IONS-SCNC: 10 MMOL/L (ref 7–16)
BASOPHILS # BLD: 0.01 K/UL (ref 0–0.2)
BASOPHILS NFR BLD: 0 % (ref 0–2)
BUN SERPL-MCNC: 16 MG/DL (ref 6–23)
CALCIUM SERPL-MCNC: 8.6 MG/DL (ref 8.6–10.2)
CHLORIDE SERPL-SCNC: 100 MMOL/L (ref 98–107)
CO2 SERPL-SCNC: 26 MMOL/L (ref 22–29)
CREAT SERPL-MCNC: 0.7 MG/DL (ref 0.7–1.2)
EKG ATRIAL RATE: 77 BPM
EKG P AXIS: 57 DEGREES
EKG P-R INTERVAL: 142 MS
EKG Q-T INTERVAL: 376 MS
EKG QRS DURATION: 96 MS
EKG QTC CALCULATION (BAZETT): 425 MS
EKG R AXIS: 38 DEGREES
EKG T AXIS: 30 DEGREES
EKG VENTRICULAR RATE: 77 BPM
EOSINOPHIL # BLD: 0.26 K/UL (ref 0.05–0.5)
EOSINOPHILS RELATIVE PERCENT: 3 % (ref 0–6)
ERYTHROCYTE [DISTWIDTH] IN BLOOD BY AUTOMATED COUNT: 12.6 % (ref 11.5–15)
GFR SERPL CREATININE-BSD FRML MDRD: >60 ML/MIN/1.73M2
GLUCOSE SERPL-MCNC: 101 MG/DL (ref 74–99)
HCT VFR BLD AUTO: 35.8 % (ref 37–54)
HGB BLD-MCNC: 12.4 G/DL (ref 12.5–16.5)
IMM GRANULOCYTES # BLD AUTO: 0.07 K/UL (ref 0–0.58)
IMM GRANULOCYTES NFR BLD: 1 % (ref 0–5)
LYMPHOCYTES NFR BLD: 1.79 K/UL (ref 1.5–4)
LYMPHOCYTES RELATIVE PERCENT: 18 % (ref 20–42)
MCH RBC QN AUTO: 31.6 PG (ref 26–35)
MCHC RBC AUTO-ENTMCNC: 34.6 G/DL (ref 32–34.5)
MCV RBC AUTO: 91.3 FL (ref 80–99.9)
MONOCYTES NFR BLD: 0.93 K/UL (ref 0.1–0.95)
MONOCYTES NFR BLD: 9 % (ref 2–12)
NEUTROPHILS NFR BLD: 70 % (ref 43–80)
NEUTS SEG NFR BLD: 7.12 K/UL (ref 1.8–7.3)
PLATELET # BLD AUTO: 183 K/UL (ref 130–450)
PMV BLD AUTO: 8.8 FL (ref 7–12)
POTASSIUM SERPL-SCNC: 3.7 MMOL/L (ref 3.5–5)
PTH RELATED PEPTIDE: 2.6 PMOL/L (ref 0–2.3)
RBC # BLD AUTO: 3.92 M/UL (ref 3.8–5.8)
REPORT STATUS: NORMAL
REPORT STATUS: NORMAL
SODIUM SERPL-SCNC: 136 MMOL/L (ref 132–146)
WBC OTHER # BLD: 10.2 K/UL (ref 4.5–11.5)

## 2024-01-04 PROCEDURE — 77012 CT SCAN FOR NEEDLE BIOPSY: CPT

## 2024-01-04 PROCEDURE — 6370000000 HC RX 637 (ALT 250 FOR IP): Performed by: INTERNAL MEDICINE

## 2024-01-04 PROCEDURE — 99233 SBSQ HOSP IP/OBS HIGH 50: CPT | Performed by: INTERNAL MEDICINE

## 2024-01-04 PROCEDURE — 6360000002 HC RX W HCPCS: Performed by: RADIOLOGY

## 2024-01-04 PROCEDURE — 2580000003 HC RX 258: Performed by: INTERNAL MEDICINE

## 2024-01-04 PROCEDURE — 71045 X-RAY EXAM CHEST 1 VIEW: CPT

## 2024-01-04 PROCEDURE — 80048 BASIC METABOLIC PNL TOTAL CA: CPT

## 2024-01-04 PROCEDURE — 88342 IMHCHEM/IMCYTCHM 1ST ANTB: CPT

## 2024-01-04 PROCEDURE — 2580000003 HC RX 258

## 2024-01-04 PROCEDURE — 2700000000 HC OXYGEN THERAPY PER DAY

## 2024-01-04 PROCEDURE — 07DR3ZX EXTRACTION OF ILIAC BONE MARROW, PERCUTANEOUS APPROACH, DIAGNOSTIC: ICD-10-PCS | Performed by: INTERNAL MEDICINE

## 2024-01-04 PROCEDURE — 88307 TISSUE EXAM BY PATHOLOGIST: CPT

## 2024-01-04 PROCEDURE — 36415 COLL VENOUS BLD VENIPUNCTURE: CPT

## 2024-01-04 PROCEDURE — 20225 BONE BIOPSY TROCAR/NDL DEEP: CPT

## 2024-01-04 PROCEDURE — 88311 DECALCIFY TISSUE: CPT

## 2024-01-04 PROCEDURE — 6370000000 HC RX 637 (ALT 250 FOR IP)

## 2024-01-04 PROCEDURE — 85025 COMPLETE CBC W/AUTO DIFF WBC: CPT

## 2024-01-04 PROCEDURE — 88341 IMHCHEM/IMCYTCHM EA ADD ANTB: CPT

## 2024-01-04 PROCEDURE — 2500000003 HC RX 250 WO HCPCS: Performed by: INTERNAL MEDICINE

## 2024-01-04 PROCEDURE — 2060000000 HC ICU INTERMEDIATE R&B

## 2024-01-04 RX ORDER — FENTANYL CITRATE 0.05 MG/ML
INJECTION, SOLUTION INTRAMUSCULAR; INTRAVENOUS PRN
Status: COMPLETED | OUTPATIENT
Start: 2024-01-04 | End: 2024-01-04

## 2024-01-04 RX ORDER — MIDAZOLAM HYDROCHLORIDE 1 MG/ML
INJECTION INTRAMUSCULAR; INTRAVENOUS PRN
Status: COMPLETED | OUTPATIENT
Start: 2024-01-04 | End: 2024-01-04

## 2024-01-04 RX ADMIN — DOXYCYCLINE 100 MG: 100 INJECTION, POWDER, LYOPHILIZED, FOR SOLUTION INTRAVENOUS at 10:04

## 2024-01-04 RX ADMIN — FENTANYL CITRATE 50 MCG: 50 INJECTION INTRAMUSCULAR; INTRAVENOUS at 13:16

## 2024-01-04 RX ADMIN — PROPRANOLOL HYDROCHLORIDE 20 MG: 10 TABLET ORAL at 21:28

## 2024-01-04 RX ADMIN — Medication 10 ML: at 21:45

## 2024-01-04 RX ADMIN — PROPRANOLOL HYDROCHLORIDE 20 MG: 10 TABLET ORAL at 15:09

## 2024-01-04 RX ADMIN — DOCUSATE SODIUM 100 MG: 100 CAPSULE, LIQUID FILLED ORAL at 09:54

## 2024-01-04 RX ADMIN — PROPRANOLOL HYDROCHLORIDE 20 MG: 10 TABLET ORAL at 09:54

## 2024-01-04 RX ADMIN — PANTOPRAZOLE SODIUM 40 MG: 40 TABLET, DELAYED RELEASE ORAL at 06:36

## 2024-01-04 RX ADMIN — MIDAZOLAM 1 MG: 1 INJECTION INTRAMUSCULAR; INTRAVENOUS at 13:16

## 2024-01-04 RX ADMIN — DOXYCYCLINE 100 MG: 100 INJECTION, POWDER, LYOPHILIZED, FOR SOLUTION INTRAVENOUS at 21:26

## 2024-01-04 RX ADMIN — Medication 5 ML: at 10:07

## 2024-01-04 NOTE — PROGRESS NOTES
Possible std leaving on trip tomorrow ,   01/03/2024 180 130 - 450 k/uL Final   01/02/2024 186 130 - 450 k/uL Final     Sodium   Date Value Ref Range Status   01/04/2024 136 132 - 146 mmol/L Final   01/03/2024 138 132 - 146 mmol/L Final   01/02/2024 134 132 - 146 mmol/L Final     Potassium   Date Value Ref Range Status   01/04/2024 3.7 3.5 - 5.0 mmol/L Final   01/03/2024 3.9 3.5 - 5.0 mmol/L Final   01/02/2024 3.7 3.5 - 5.0 mmol/L Final     Chloride   Date Value Ref Range Status   01/04/2024 100 98 - 107 mmol/L Final   01/03/2024 100 98 - 107 mmol/L Final   01/02/2024 97 (L) 98 - 107 mmol/L Final     CO2   Date Value Ref Range Status   01/04/2024 26 22 - 29 mmol/L Final   01/03/2024 30 (H) 22 - 29 mmol/L Final   01/02/2024 27 22 - 29 mmol/L Final     BUN   Date Value Ref Range Status   01/04/2024 16 6 - 23 mg/dL Final   01/03/2024 20 6 - 23 mg/dL Final   01/02/2024 21 6 - 23 mg/dL Final     Creatinine   Date Value Ref Range Status   01/04/2024 0.7 0.70 - 1.20 mg/dL Final   01/03/2024 0.8 0.70 - 1.20 mg/dL Final   01/02/2024 0.8 0.70 - 1.20 mg/dL Final     Glucose   Date Value Ref Range Status   01/04/2024 101 (H) 74 - 99 mg/dL Final   01/03/2024 93 74 - 99 mg/dL Final   01/02/2024 97 74 - 99 mg/dL Final   10/06/2011 88 70 - 110 mg/dL Final     Calcium   Date Value Ref Range Status   01/04/2024 8.6 8.6 - 10.2 mg/dL Final   01/03/2024 8.4 (L) 8.6 - 10.2 mg/dL Final   01/02/2024 8.7 8.6 - 10.2 mg/dL Final     Total Protein   Date Value Ref Range Status   12/30/2023 6.2 (L) 6.4 - 8.3 g/dL Final   12/29/2023 6.6 6.4 - 8.3 g/dL Final   12/29/2023 6.5 6.4 - 8.3 g/dL Final     Albumin   Date Value Ref Range Status   12/30/2023 3.2 (L) 3.5 - 5.2 g/dL Final   12/29/2023 3.6 3.5 - 5.2 g/dL Final   12/29/2023 3.5 3.5 - 5.2 g/dL Final   10/06/2011 4.1 3.2 - 4.8 g/dL Final     Total Bilirubin   Date Value Ref Range Status   12/30/2023 0.2 0.0 - 1.2 mg/dL Final   12/29/2023 0.3 0.0 - 1.2 mg/dL Final   12/29/2023 0.3 0.0 - 1.2 mg/dL Final     Alkaline Phosphatase   Date  Only a small portion of the left upper   lobe is aerated.   3. Ground-glass opacification at the right lung apex might reflect   infectious/inflammatory process or scarring.   4. Sclerotic foci in the T10 and T11 vertebral bodies, cannot exclude   metastatic disease.         XR CHEST PORTABLE   Final Result   Stable chest radiograph with large left pleural effusion and opacity   involving nearly the entire lung.         XR CHEST (2 VW)   Final Result   1. Massive left pleural effusion with dense consolidation of the majority of   the left lung.   2. Moderate shift of the mediastinum towards the right.   3. Moderately displaced fracture of the posterior-lateral left 8th rib. The   margins are indistinct, and this may be a pathologic fracture.         Vascular duplex lower extremity venous bilateral   Final Result   No evidence of DVT in either lower extremity.         IR GUIDED THORACENTESIS PLEURAL    (Results Pending)   IR BIOPSY DEEP BONE    (Results Pending)           PROBLEM LIST:  Principal Problem:    Metastatic cancer (HCC)  Active Problems:    Hyponatremia    Pleural effusion  Resolved Problems:    * No resolved hospital problems. *      IMPRESSION:  Large left pleural effusion causing contralateral shift of the mediastinum to the right  Suspected left-sided and trapped lung versus small pneumo  Acute hypoxic respiratory failure requiring supplemental oxygen.   Hyponatremia most consistent with SIADH  Elevated PSA  Evidence of likely bone metastases  Elevated alkaline phosphatase  Nontraumatic rib fractures, pathological etiology is possible  Elevated troponin possible NSTEMI versus demand ischemia  Hyperlipidemia  Systemic hypertension    PLAN:  Pleural effusion is exudative.  Cytology still pending  Patient is scheduled for biopsy today.  Diet ordered  Continue with ceftriaxone and doxycycline for 5 days  CT abdomen and pelvis / CT head reviewed   Prophylactic DVT treatment  Left-sided chest tube inserted

## 2024-01-04 NOTE — PROGRESS NOTES
[facility]  Hematology/Oncology  Consult      Patient Name: Wilmer Fuentes  YOB: 1945  PCP: Todd Cross DO   Referring Provider:      Reason for Consultation:   Chief Complaint   Patient presents with    Shortness of Breath     Was at [facility] today and pcp called pt to go to ed, cough couple weeks, no n/v/d, no fever or chills        History of Present Illness: 78-year-old man with past medical history relevant for hypertension, hyperlipidemia and anxiety hospitalized with progressively worsening dyspnea and leg edema.  No fever or chest pain nausea vomiting or hemoptysis.  His BMP on admission showed hyponatremia.  Serum creatinine was normal.  Hepatic panel with hypoalbuminemia with elevated alk phos with normal transaminases and normal bilirubin.  CBC with a hemoglobin of 13 with normal platelet and neutrophilic leukocytosis related to Solumedrol .  PSA was elevated at 20.9.  CEA was 5.0 with minimally elevated  at 37  Chest x-ray and CTA of the chest showed a large left pleural effusion with partial collapse of left upper lobe.  There was groundglass opacities in the right lung apex suggestive of infectious inflammatory process.  Suspicious sclerotic foci at T10 and T11 vertebral bodies noted which may represent metastatic disease .  Bone scans show multiple focal areas of increased activity suspicious for metastatic disease most prominent in the right posterior calvarium and thoracolumbar spine as well as left posterior iliac wing and right hip intertrochanteric area.  CT abdomen, pelvis and CT head pending  Left-sided thoracentesis performed and fluid was exudative with markedly elevated total proteins and LDH highly consistent with malignant effusion and cytology pending.        Diagnostic Data:     Past Medical History:   Diagnosis Date    Anxiety     Hyperlipidemia     Hypertension        Patient Active Problem List    Diagnosis Date Noted    Pleural effusion 12/31/2023

## 2024-01-04 NOTE — CARE COORDINATION
1-4-Cm note: pt had bone bx today in specials, he has a Left chest tube to 30 cm sx. Pt on room air while cm in room, PT/OT recommending home health care, pt declining the need, states his family is there and they can assist him if needed. Cm following. Electronically signed by Dora Miller RN on 1/4/2024 at 2:29 PM

## 2024-01-04 NOTE — PROGRESS NOTES
Internal Medicine Progress Note    [facility]    Jonnathan Pollock D.O., CICI Hutchins D.O., CICI Arellano D.O.    Joan Rai, MSN, APRN, NP-C  Chavez Harmon, MSN, APRN-CNP  Reid Oliveros, MSN, APRN, NP-C     Primary Care Physician: Todd Cross DO   Admitting Physician:  Jonnathan Pollock DO  Admission date and time: 12/28/2023  8:08 PM    Room:  [address] [place] [address]  Admitting diagnosis: Hyponatremia [E87.1]  Pleural effusion [J90]    Patient Name: Wilmer Fuentes  MRN: [id number]    Date of Service: 1/4/2024     Subjective:  Wilmer is a 78 y.o. male who was seen and examined today,1/4/2024, at the bedside.  Patient resting comfortably.  He has a persistent moist nonproductive cough.  Left-sided chest tube remains intact to waterseal drainage.  He is scheduled for a bone biopsy today.    Review of System:   Constitutional:   Stable malaise and fatigue.  HEENT:   Denies ear pain, sore throat, sinus or eye problems.  Nasal cannula oxygen is in place.  Cardiovascular:   Denies any chest pain, irregular heartbeats, or palpitations.   Respiratory:   Improving shortness of breath.  Less orthopnea overall.  Gastrointestinal:   Decreased appetite and therefore decreased oral intake.    Genitourinary:    Denies any urgency, frequency, hematuria. Voiding without difficulty.  Extremities:   Denies lower extremity swelling, edema or cyanosis.   Neurology:    Denies any headache or focal neurological deficits, ongoing weakness and deconditioning.  Psch:   Denies being anxious or depressed.  Musculoskeletal:    Denies  myalgias, joint complaints or back pain.   Integumentary:   Denies any rashes, ulcers, or excoriations.  Denies bruising.  Hematologic/Lymphatic:  Denies bruising or bleeding.    Physical Exam:  I/O this shift:  In: -   Out: 220 [Chest Tube:220]    Intake/Output Summary (Last 24 hours) at 1/4/2024 1327  Last data filed at  mg SubCUTAneous Daily     Continuous Infusions:   sodium chloride         Objective Data:  CBC with Differential:    Lab Results   Component Value Date/Time    WBC 10.2 01/04/2024 05:00 AM    RBC 3.92 01/04/2024 05:00 AM    HGB 12.4 01/04/2024 05:00 AM    HCT 35.8 01/04/2024 05:00 AM     01/04/2024 05:00 AM    MCV 91.3 01/04/2024 05:00 AM    MCH 31.6 01/04/2024 05:00 AM    MCHC 34.6 01/04/2024 05:00 AM    RDW 12.6 01/04/2024 05:00 AM    SEGSPCT 39 05/07/2013 09:00 AM    LYMPHOPCT 18 01/04/2024 05:00 AM    MONOPCT 9 01/04/2024 05:00 AM    BASOPCT 0 01/04/2024 05:00 AM    MONOSABS 0.93 01/04/2024 05:00 AM    LYMPHSABS 1.79 01/04/2024 05:00 AM    EOSABS 0.26 01/04/2024 05:00 AM    BASOSABS 0.01 01/04/2024 05:00 AM     BMP:    Lab Results   Component Value Date/Time     01/04/2024 05:00 AM    K 3.7 01/04/2024 05:00 AM     01/04/2024 05:00 AM    CO2 26 01/04/2024 05:00 AM    BUN 16 01/04/2024 05:00 AM    LABALBU 3.2 12/30/2023 05:55 AM    LABALBU 4.1 10/06/2011 08:35 AM    CREATININE 0.7 01/04/2024 05:00 AM    CALCIUM 8.6 01/04/2024 05:00 AM    GFRAA >60 08/05/2021 08:42 AM    LABGLOM >60 01/04/2024 05:00 AM    GLUCOSE 101 01/04/2024 05:00 AM    GLUCOSE 88 10/06/2011 08:35 AM       Assessment:  Diffusely metastatic process resulting in malignant pleural effusion with unknown primary with suspicion for prostate versus pulmonary origin  Hyponatremia compatible with SIADH  Non-ST elevated myocardial infarction compatible with demand ischemia  Viral infection with RSV  Essential hypertension  Gastroesophageal reflux disease    Plan:   Wean oxygen as patient can tolerate  Right-sided chest tube  Bone biopsy today  Continue antibiotic treatment  Pulmonology and oncology following  Lovenox for DVT prophylaxis    Ozia requires this high level of physician care and nursing on the IMC/Telemetry unit due the complexity of decision management and chance of rapid decline or death.  Continued cardiac monitoring and

## 2024-01-04 NOTE — PROGRESS NOTES
Patient came down to special procedures for ct guided bone lesion biopsy.  Patient was educated about the amount of radiation used with today's procedure.    Patient taken to Cat Scan.  Patient positioned prone , secured on Cat Scan table with O2 and monitoring devices attached.     Patient scanned and images reviewed by Dr Turpin     Patient prepped secured and draped.     Emotional support given.    1316 - sedation medication given    1324 - Procedure start /79 85 13 99% on 2 liters nasal canula     With the guidance of Cat Scan, needle inserted and 1 - 12 gauge core biopsies taken by Dr. Turpin     Patient re-scanned and images reviewed by     Puncture site cleansed and dry sterile dressing of folded 4 x 4 and tegaderm applied to left lower back.     1335 - Procedure completed /90 84 20 98% on 2 liters nasal canula     Patient tolerated procedure    Biopsy sample taken to laboratory.     See sedation documentation for vital signs    1350 - 15 minutes post procedure /87 80 19 93% on room air,  no complaints of pain at puncture site. Dressing CDI    1405 - 30 minutes post procedure /86 75 18 93% on room air,  no complaints of pain at puncture site. Dressing CDI    Total amount of sedation medication given during procedure: 1 mg of IV Versed and 50 mcg of IV Fentanyl    Nurse to nurse called, spoke with Rachel RN, notified nurse of above information, nurse assumed post sedation vitals signs    Patient transported back to 5th floor

## 2024-01-05 LAB
NON-GYN CYTOLOGY REPORT: NORMAL
NON-GYN CYTOLOGY REPORT: NORMAL

## 2024-01-05 PROCEDURE — 6360000002 HC RX W HCPCS: Performed by: INTERNAL MEDICINE

## 2024-01-05 PROCEDURE — 2060000000 HC ICU INTERMEDIATE R&B

## 2024-01-05 PROCEDURE — 97110 THERAPEUTIC EXERCISES: CPT | Performed by: PHYSICAL THERAPIST

## 2024-01-05 PROCEDURE — 6370000000 HC RX 637 (ALT 250 FOR IP): Performed by: INTERNAL MEDICINE

## 2024-01-05 PROCEDURE — 97530 THERAPEUTIC ACTIVITIES: CPT | Performed by: PHYSICAL THERAPIST

## 2024-01-05 PROCEDURE — 6370000000 HC RX 637 (ALT 250 FOR IP)

## 2024-01-05 PROCEDURE — 2580000003 HC RX 258

## 2024-01-05 PROCEDURE — 2580000003 HC RX 258: Performed by: INTERNAL MEDICINE

## 2024-01-05 PROCEDURE — 99233 SBSQ HOSP IP/OBS HIGH 50: CPT | Performed by: INTERNAL MEDICINE

## 2024-01-05 RX ADMIN — DOCUSATE SODIUM 100 MG: 100 CAPSULE, LIQUID FILLED ORAL at 09:36

## 2024-01-05 RX ADMIN — CEFTRIAXONE SODIUM 2000 MG: 2 INJECTION, POWDER, FOR SOLUTION INTRAMUSCULAR; INTRAVENOUS at 00:10

## 2024-01-05 RX ADMIN — PROPRANOLOL HYDROCHLORIDE 20 MG: 10 TABLET ORAL at 09:36

## 2024-01-05 RX ADMIN — ENOXAPARIN SODIUM 40 MG: 100 INJECTION SUBCUTANEOUS at 09:36

## 2024-01-05 RX ADMIN — Medication 10 ML: at 09:37

## 2024-01-05 RX ADMIN — PROPRANOLOL HYDROCHLORIDE 20 MG: 10 TABLET ORAL at 20:21

## 2024-01-05 RX ADMIN — PROPRANOLOL HYDROCHLORIDE 20 MG: 10 TABLET ORAL at 14:09

## 2024-01-05 RX ADMIN — PANTOPRAZOLE SODIUM 40 MG: 40 TABLET, DELAYED RELEASE ORAL at 05:26

## 2024-01-05 RX ADMIN — Medication 10 ML: at 20:22

## 2024-01-05 NOTE — PROGRESS NOTES
This  visited patient while on rounds.  offered to contact patients [facility] liaison  liaison.  Patient stated that contact had already been  made.  offered a listening presence. Spiritual care is ongoing and as needed.

## 2024-01-05 NOTE — PROGRESS NOTES
Physical Therapy    Physical Therapy Treatment Note/Plan of Care    Room #:  0517/0517-01  Patient Name: Wilmer Fuentes  YOB: 1945  MRN: [id number]    Date of Service: 1/5/2024     Tentative placement recommendation: Home with Home Health Physical Therapy versus subacute if goals not met  Equipment recommendation: Patient has needed equipment       Evaluating Physical Therapist: Rey Donaldson, PT  #[id number]      Specific Provider Orders/Date/Referring Provider :     PT eval and treat  Start:  12/30/23 1045,   End:  12/30/23 1045,   ONE TIME,   Standing Count:  1 Occurrences,   R       Brown Bennett MD    Admitting Diagnosis:   Hyponatremia [E87.1]  Pleural effusion [J90]    Admitted with    cough, shortness of breath , pleural effusion, bilateral lower extremities swelling  Surgery: none  Visit Diagnoses         Codes    Postprocedural pneumothorax     J95.811            Patient Active Problem List   Diagnosis    Hyponatremia    Metastatic cancer (HCC)    Pleural effusion        ASSESSMENT of Current Deficits Patient exhibits decreased strength, balance, endurance, and pain right shoulder and chest tube insertion  impairing functional mobility, transfers, gait , gait distance, and tolerance to activity are barriers to d/c and require skilled intervention to address concerns listed above to increase safety and independence at discharge.   Decreased strength, balance and endurance  increases patient's risk for fall.   Pt mildly unsteady with ambulation with no LOB, dizziness, with moderate SOB following ambulation. Pt agreeable to seated exercises following function with VC for technique.       PHYSICAL THERAPY  PLAN OF CARE       Physical therapy plan of care is established based on physician order,  patient diagnosis and clinical assessment    Current Treatment Recommendations:    -Bed Mobility: Lower extremity exercises , Upper extremity exercises , and Trunk control activities

## 2024-01-05 NOTE — PROGRESS NOTES
[facility]  Hematology/Oncology  Consult      Patient Name: Wilmer Fuentes  YOB: 1945  PCP: Todd Cross DO   Referring Provider:      Reason for Consultation:   Chief Complaint   Patient presents with    Shortness of Breath     Was at [facility] today and pcp called pt to go to ed, cough couple weeks, no n/v/d, no fever or chills        History of Present Illness: 78-year-old man with past medical history relevant for hypertension, hyperlipidemia and anxiety hospitalized with progressively worsening dyspnea and leg edema.  No fever or chest pain nausea vomiting or hemoptysis.  His BMP on admission showed hyponatremia.  Serum creatinine was normal.  Hepatic panel with hypoalbuminemia with elevated alk phos with normal transaminases and normal bilirubin.  CBC with a hemoglobin of 13 with normal platelet and neutrophilic leukocytosis related to Solumedrol .  PSA was elevated at 20.9.  CEA was 5.0 with minimally elevated  at 37  Chest x-ray and CTA of the chest showed a large left pleural effusion with partial collapse of left upper lobe.  There was groundglass opacities in the right lung apex suggestive of infectious inflammatory process.  Suspicious sclerotic foci at T10 and T11 vertebral bodies noted which may represent metastatic disease .  Bone scans show multiple focal areas of increased activity suspicious for metastatic disease most prominent in the right posterior calvarium and thoracolumbar spine as well as left posterior iliac wing and right hip intertrochanteric area.  CT abdomen, pelvis and CT head pending  Left-sided thoracentesis performed and fluid was exudative with markedly elevated total proteins and LDH highly consistent with malignant effusion and cytology pending.        Diagnostic Data:     Past Medical History:   Diagnosis Date    Anxiety     Hyperlipidemia     Hypertension        Patient Active Problem List    Diagnosis Date Noted    Pleural effusion 12/31/2023  displaced fracture of the posterior-lateral left 8th rib. The margins are indistinct, and this may be a pathologic fracture.     Vascular duplex lower extremity venous bilateral    Result Date: 12/28/2023  EXAMINATION: DUPLEX VENOUS ULTRASOUND OF THE BILATERAL LOWER [id number]/28/2023 9:08 pm TECHNIQUE: Duplex ultrasound using B-mode/gray scaled imaging and Doppler spectral analysis and color flow was obtained of the deep venous structures of the bilateral extremities. COMPARISON: None. HISTORY: ORDERING SYSTEM PROVIDED HISTORY: LEG SWELLING, PAIN, DVT SUSPECTED FINDINGS: The visualized veins of the bilateral lower extremities are patent and free of echogenic thrombus. The veins demonstrate good compressibility with normal color flow study and spectral analysis.     No evidence of DVT in either lower extremity.         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    1/1/2024  Awake and very interactive and main issue is dyspnea on minimal exertion  No fever or chills.  Continues on ceftriaxone and doxycycline.  Left-sided chest tube was inserted yesterday with over 3 L of output chest tube was put on gravity this morning.  Pleural fluid was exudative with high total proteins and markedly elevated LDH consistent

## 2024-01-05 NOTE — PROGRESS NOTES
Comprehensive Nutrition Assessment    Type and Reason for Visit:  Initial, Positive Nutrition Screen (LOS)    Nutrition Recommendations/Plan:   Continue Current Diet     Malnutrition Assessment:  Malnutrition Status:  Insufficient data (JENNY d/t isolation status) (01/05/24 1128)    Context:  Chronic Illness     Findings of the 6 clinical characteristics of malnutrition:  Energy Intake:  Mild decrease in energy intake (Comment) (poor po intake ~3wks)  Weight Loss:  No significant weight loss     Body Fat Loss:  Unable to assess     Muscle Mass Loss:  Unable to assess    Fluid Accumulation:  Unable to assess     Strength:  Not Performed    Nutrition Assessment:    Pt admit for hypoNA+, Left Pleural Effusion w/ mediastinal shift, NSTEMI, Rib fx, SIADH, prostate cancer w/ anu to brain/skull. PMHx: HTN, HLD, cancer. Pt triggered for LOS. JENNY Mal d/t isolation for RSV. Pt has had poor po intake ~3weeks. Pt intake % per nursing note.    Nutrition Related Findings:    A/O x4, I/O -10, 964 since admit, Left chest tube w/ serosanguinous fluid 200ml, abd wdl, bowel sounds x4 active, dinora colored urine, BLUE edema trace, e'lytes wnl, Hgb 12.4, Hct 35.8 Wound Type: None       Current Nutrition Intake & Therapies:    Average Meal Intake: %  Average Supplements Intake: None Ordered  ADULT DIET; Regular; No Added Salt (3-4 gm); 1500 ml    Anthropometric Measures:  Height: 180.3 cm (5' 10.98\")  Ideal Body Weight (IBW): 172 lbs (78 kg)    Admission Body Weight: 89.6 kg (197 lb 8.5 oz)  Current Body Weight: 89.6 kg (197 lb 8.5 oz), 114.8 % IBW. Weight Source: Bed Scale (12/31/23)  Current BMI (kg/m2): 27.6  Usual Body Weight: 93 kg (205 lb 0.4 oz) (06/22/23 stated)  % Weight Change (Calculated): -3.7  Weight Adjustment For: No Adjustment         BMI Categories: Overweight (BMI 25.0-29.9)    Estimated Daily Nutrient Needs:  Energy Requirements Based On: Formula  Weight Used for Energy Requirements: Current  Energy  (kcal/day): 1950 - 2280 kcals/day (CBW MSJ SF 1.2-1.4 cancer guidelines)  Weight Used for Protein Requirements: Ideal  Protein (g/day): 94 - 117 g/day (IBW 1.2-1.5 g/kg cancer guidelines)  Method Used for Fluid Requirements: 1 ml/kcal  Fluid (ml/day): per care team    Nutrition Diagnosis:   Increased nutrient needs related to catabolic illness as evidenced by other (comment) (cancer)    Nutrition Interventions:   Food and/or Nutrient Delivery: Continue Current Diet  Nutrition Education/Counseling: No recommendation at this time  Coordination of Nutrition Care: Continue to monitor while inpatient     Goals:   Goals: Meet at least 75% of estimated needs, by next RD assessment     Nutrition Monitoring and Evaluation:   Behavioral-Environmental Outcomes: None Identified  Food/Nutrient Intake Outcomes: Food and Nutrient Intake  Physical Signs/Symptoms Outcomes: Biochemical Data, Nausea or Vomiting, GI Status, Fluid Status or Edema, Nutrition Focused Physical Findings, Skin, Weight    Discharge Planning:    Too soon to determine     Rachel Martinez RD  Contact: [phone]

## 2024-01-05 NOTE — PROGRESS NOTES
Pulmonary/Critical Care Progress Note    We are following patient for large left pleural effusion, elevated PSA, possible pneumonia, fractures of ribs, metastatic disease to the skull, poor appetite, hyponatremia consistent with SIADH (urine osmolality much greater than serum osmolality)  SUBJECTIVE:  Patient continues to be awake and interactive.  Pleural fluid cytology came positive for adenocarcinoma.  He has been weaned off supplemental oxygen however he desaturates easily with exertion.  Patient had around 500% of output from left-sided chest tube.    MEDICATIONS:   docusate sodium  100 mg Oral Daily    urea  15 g Oral Daily    propranolol  20 mg Oral TID    sodium chloride flush  5-40 mL IntraVENous 2 times per day    pantoprazole  40 mg Oral QAM AC    enoxaparin  40 mg SubCUTAneous Daily      sodium chloride       ipratropium 0.5 mg-albuterol 2.5 mg, ketorolac, polyethylene glycol, magnesium sulfate, sodium phosphate 14.37 mmol in sodium chloride 0.9 % 250 mL IVPB **OR** sodium phosphate 28.77 mmol in sodium chloride 0.9 % 500 mL IVPB, potassium chloride **OR** potassium alternative oral replacement **OR** potassium chloride, perflutren lipid microspheres, sodium chloride flush, sodium chloride      REVIEW OF SYSTEMS:  Constitutional: Denies fever, weight loss, night sweats, and fatigue  Skin: Denies pigmentation, dark lesions, and rashes   HEENT: Denies hearing loss, tinnitus, ear drainage, epistaxis, sore throat, and hoarseness.  Cardiovascular: Denies palpitations, chest pain, and chest pressure.  Respiratory: Denies cough, dyspnea at rest, hemoptysis, apnea, and choking.  Gastrointestinal: Denies nausea, vomiting, poor appetite, diarrhea, heartburn or reflux  Genitourinary: Denies dysuria, frequency, urgency or hematuria  Musculoskeletal: Denies myalgias, muscle weakness, and bone pain  Neurological: Denies dizziness, vertigo, headache, and focal weakness  Psychological: Denies anxiety and  Status   08/05/2021 >60  Final   10/29/2019 >60  Final   07/03/2018 >60  Final     Magnesium   Date Value Ref Range Status   01/02/2024 2.2 1.6 - 2.6 mg/dL Final   01/01/2024 2.2 1.6 - 2.6 mg/dL Final   12/31/2023 2.2 1.6 - 2.6 mg/dL Final     Phosphorus   Date Value Ref Range Status   01/02/2024 3.3 2.5 - 4.5 mg/dL Final   01/01/2024 3.6 2.5 - 4.5 mg/dL Final   12/31/2023 3.0 2.5 - 4.5 mg/dL Final     No results for input(s): \"PH\", \"PO2\", \"PCO2\", \"HCO3\", \"BE\", \"O2SAT\" in the last 72 hours.    RADIOLOGY:  IR BIOPSY DEEP BONE   Final Result   Successful CT guided core biopsy of the left iliac bone.         XR CHEST PORTABLE   Final Result   1.  Improvement of left pneumothorax, left pleural catheter remains in   position.      2.  No new alveolar consolidation.         CT CHEST WO CONTRAST   Final Result   1. Interval placement of left pleural drainage catheter with small volume   apical predominant pneumothorax.   2. Opacifications of atelectatic left lower lung with surrounding   pneumothorax concerning for trapped lung as indicated given atelectasis with   surrounding small volume pleural air.   3. Opacifications are patchy throughout the left upper lung concerning for   bronchiolitis.   4. Left rib fractures similar to prior with adjacent chest wall contusion and   minimal body wall edema. No hematoma evidence.         XR CHEST PORTABLE   Final Result   Slightly diminished left-sided pneumothorax presently in the range of 15-20%         XR CHEST PORTABLE   Final Result   Since the study of a January 1st at 05:44 a.m. there is now drainage of   left-sided pleural effusion and there is development of a left-sided   pneumothorax of approximately 20%.         XR CHEST PORTABLE   Final Result   1.  Stable left pleural drainage catheter.      2.  Small  left pleural effusion.         XR CHEST PORTABLE   Final Result   Redemonstration of a left pleural drainage catheter.      Improved opacification of the left

## 2024-01-05 NOTE — PROGRESS NOTES
Internal Medicine Progress Note    [facility]    Jonnathan Pollock D.O., STACIA.                    Primitivo Hutchins D.O., CICI Arellano D.O.    Joan Rai, MSN, APRN, NP-C  Chavez Harmon, MSN, APRN-CNP  Reid Oliveros, MSN, APRN, NP-C     Primary Care Physician: Todd Cross DO   Admitting Physician:  Jonnathan Pollock DO  Admission date and time: 12/28/2023  8:08 PM    Room:  [address] [place] [address]  Admitting diagnosis: Hyponatremia [E87.1]  Pleural effusion [J90]    Patient Name: Wilmer Fuentes  MRN: [id number]    Date of Service: 1/5/2024     Subjective:  Wilmer is a 78 y.o. male who was seen and examined today,1/5/2024, at the bedside.  Patient resting comfortably at the present time.  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  Oxygen is currently off.  Cytology malignancy came back positive for adenocarcinoma.  Patient anxious for discharge    No family member present    Review of System:   Constitutional:   Stable malaise and fatigue.  HEENT:   Denies ear pain, sore throat, sinus or eye problems.  Nasal cannula oxygen is in place.  Cardiovascular:   Denies any chest pain, irregular heartbeats, or palpitations.   Respiratory:   Improving shortness of breath.  Less orthopnea overall.  Gastrointestinal:   Decreased appetite and therefore decreased oral intake.    Genitourinary:    Denies any urgency, frequency, hematuria. Voiding without difficulty.  Extremities:   Denies lower extremity swelling, edema or cyanosis.   Neurology:    Denies any headache or focal neurological deficits, ongoing weakness and deconditioning.  Psch:   Denies being anxious or depressed.  Musculoskeletal:    Denies  myalgias, joint complaints or back pain.   Integumentary:   Denies any rashes, ulcers, or excoriations.  Denies bruising.  Hematologic/Lymphatic:  Denies bruising or bleeding.    Physical Exam:  I/O this shift:  In: -   Out: 120 [Chest

## 2024-01-06 LAB
ANION GAP SERPL CALCULATED.3IONS-SCNC: 8 MMOL/L (ref 7–16)
BASOPHILS # BLD: 0.02 K/UL (ref 0–0.2)
BASOPHILS NFR BLD: 0 % (ref 0–2)
BUN SERPL-MCNC: 19 MG/DL (ref 6–23)
CALCIUM SERPL-MCNC: 8.4 MG/DL (ref 8.6–10.2)
CHLORIDE SERPL-SCNC: 99 MMOL/L (ref 98–107)
CO2 SERPL-SCNC: 28 MMOL/L (ref 22–29)
CREAT SERPL-MCNC: 0.8 MG/DL (ref 0.7–1.2)
EOSINOPHIL # BLD: 0.18 K/UL (ref 0.05–0.5)
EOSINOPHILS RELATIVE PERCENT: 2 % (ref 0–6)
ERYTHROCYTE [DISTWIDTH] IN BLOOD BY AUTOMATED COUNT: 12.8 % (ref 11.5–15)
GFR SERPL CREATININE-BSD FRML MDRD: >60 ML/MIN/1.73M2
GLUCOSE SERPL-MCNC: 128 MG/DL (ref 74–99)
HCT VFR BLD AUTO: 36.9 % (ref 37–54)
HGB BLD-MCNC: 12.5 G/DL (ref 12.5–16.5)
IMM GRANULOCYTES # BLD AUTO: 0.06 K/UL (ref 0–0.58)
IMM GRANULOCYTES NFR BLD: 1 % (ref 0–5)
LYMPHOCYTES NFR BLD: 1.58 K/UL (ref 1.5–4)
LYMPHOCYTES RELATIVE PERCENT: 19 % (ref 20–42)
MCH RBC QN AUTO: 32 PG (ref 26–35)
MCHC RBC AUTO-ENTMCNC: 33.9 G/DL (ref 32–34.5)
MCV RBC AUTO: 94.4 FL (ref 80–99.9)
MONOCYTES NFR BLD: 0.89 K/UL (ref 0.1–0.95)
MONOCYTES NFR BLD: 11 % (ref 2–12)
NEUTROPHILS NFR BLD: 68 % (ref 43–80)
NEUTS SEG NFR BLD: 5.75 K/UL (ref 1.8–7.3)
PLATELET # BLD AUTO: 183 K/UL (ref 130–450)
PMV BLD AUTO: 9.2 FL (ref 7–12)
POTASSIUM SERPL-SCNC: 3.7 MMOL/L (ref 3.5–5)
RBC # BLD AUTO: 3.91 M/UL (ref 3.8–5.8)
SODIUM SERPL-SCNC: 135 MMOL/L (ref 132–146)
WBC OTHER # BLD: 8.5 K/UL (ref 4.5–11.5)

## 2024-01-06 PROCEDURE — 6370000000 HC RX 637 (ALT 250 FOR IP): Performed by: INTERNAL MEDICINE

## 2024-01-06 PROCEDURE — 85025 COMPLETE CBC W/AUTO DIFF WBC: CPT

## 2024-01-06 PROCEDURE — 80048 BASIC METABOLIC PNL TOTAL CA: CPT

## 2024-01-06 PROCEDURE — 2060000000 HC ICU INTERMEDIATE R&B

## 2024-01-06 PROCEDURE — 36415 COLL VENOUS BLD VENIPUNCTURE: CPT

## 2024-01-06 PROCEDURE — 99233 SBSQ HOSP IP/OBS HIGH 50: CPT | Performed by: INTERNAL MEDICINE

## 2024-01-06 PROCEDURE — 6360000002 HC RX W HCPCS: Performed by: INTERNAL MEDICINE

## 2024-01-06 PROCEDURE — 6370000000 HC RX 637 (ALT 250 FOR IP)

## 2024-01-06 PROCEDURE — 2580000003 HC RX 258

## 2024-01-06 RX ADMIN — PROPRANOLOL HYDROCHLORIDE 20 MG: 10 TABLET ORAL at 08:38

## 2024-01-06 RX ADMIN — PROPRANOLOL HYDROCHLORIDE 20 MG: 10 TABLET ORAL at 14:44

## 2024-01-06 RX ADMIN — Medication 10 ML: at 08:39

## 2024-01-06 RX ADMIN — PANTOPRAZOLE SODIUM 40 MG: 40 TABLET, DELAYED RELEASE ORAL at 06:00

## 2024-01-06 RX ADMIN — Medication 10 ML: at 22:26

## 2024-01-06 RX ADMIN — PROPRANOLOL HYDROCHLORIDE 20 MG: 10 TABLET ORAL at 20:40

## 2024-01-06 RX ADMIN — DOCUSATE SODIUM 100 MG: 100 CAPSULE, LIQUID FILLED ORAL at 08:39

## 2024-01-06 RX ADMIN — ENOXAPARIN SODIUM 40 MG: 100 INJECTION SUBCUTANEOUS at 08:38

## 2024-01-06 NOTE — PROGRESS NOTES
Pulmonary/Critical Care Progress Note    We are following patient for large left pleural effusion, elevated PSA, possible pneumonia, fractures of ribs, metastatic disease to the skull, poor appetite, hyponatremia consistent with SIADH (urine osmolality much greater than serum osmolality)  SUBJECTIVE:  Patient is currently on room air.  Patient continues to be awake and interactive.  Pleural fluid cytology came positive for adenocarcinoma.  He has been weaned off supplemental oxygen however he desaturates easily with exertion.  Patient had around 360 cc of output from left-sided chest tube.    MEDICATIONS:   docusate sodium  100 mg Oral Daily    urea  15 g Oral Daily    propranolol  20 mg Oral TID    sodium chloride flush  5-40 mL IntraVENous 2 times per day    pantoprazole  40 mg Oral QAM AC    enoxaparin  40 mg SubCUTAneous Daily      sodium chloride       ipratropium 0.5 mg-albuterol 2.5 mg, polyethylene glycol, magnesium sulfate, sodium phosphate 14.37 mmol in sodium chloride 0.9 % 250 mL IVPB **OR** sodium phosphate 28.77 mmol in sodium chloride 0.9 % 500 mL IVPB, potassium chloride **OR** potassium alternative oral replacement **OR** potassium chloride, perflutren lipid microspheres, sodium chloride flush, sodium chloride      REVIEW OF SYSTEMS:  Constitutional: Denies fever, weight loss, night sweats, and fatigue  Skin: Denies pigmentation, dark lesions, and rashes   HEENT: Denies hearing loss, tinnitus, ear drainage, epistaxis, sore throat, and hoarseness.  Cardiovascular: Denies palpitations, chest pain, and chest pressure.  Respiratory: Denies cough, dyspnea at rest, hemoptysis, apnea, and choking.  Gastrointestinal: Denies nausea, vomiting, poor appetite, diarrhea, heartburn or reflux  Genitourinary: Denies dysuria, frequency, urgency or hematuria  Musculoskeletal: Denies myalgias, muscle weakness, and bone pain  Neurological: Denies dizziness, vertigo, headache, and focal weakness  Psychological: Denies

## 2024-01-06 NOTE — PROGRESS NOTES
Internal Medicine Progress Note    [facility]    Jonnathan Pollock D.O., CICI Hutchins D.O., CICI Arellano D.O.    Joan Rai, MSN, APRN, NP-C  Chavez Harmon, MSN, APRN-CNP  Reid Oliveros, MSN, APRN, NP-C     Primary Care Physician: Todd Cross DO   Admitting Physician:  Jonnathan Pollock DO  Admission date and time: 12/28/2023  8:08 PM    Room:  [address] [place] [address]  Admitting diagnosis: Hyponatremia [E87.1]  Pleural effusion [J90]    Patient Name: Wilmer Fuentes  MRN: [id number]    Date of Service: 1/6/2024     Subjective:  Wilmer is a 78 y.o. male who was seen and examined today,1/6/2024, at the bedside.  Patient resting comfortably at the present time.  His daughter is currently at the bedside.  His chest tube remains to -30 cm of suction.  He denies any pain or discomfort or shortness of breath remains on room air.no family member present    Review of System:   Constitutional:   Stable malaise and fatigue.  HEENT:   Denies ear pain, sore throat, sinus or eye problems.  Nasal cannula oxygen is in place.  Cardiovascular:   Denies any chest pain, irregular heartbeats, or palpitations.   Respiratory:   Improving shortness of breath.  Less orthopnea overall.  Gastrointestinal:   Decreased appetite and therefore decreased oral intake.    Genitourinary:    Denies any urgency, frequency, hematuria. Voiding without difficulty.  Extremities:   Denies lower extremity swelling, edema or cyanosis.   Neurology:    Denies any headache or focal neurological deficits, ongoing weakness and deconditioning.  Psch:   Denies being anxious or depressed.  Musculoskeletal:    Denies  myalgias, joint complaints or back pain.   Integumentary:   Denies any rashes, ulcers, or excoriations.  Denies bruising.  Hematologic/Lymphatic:  Denies bruising or bleeding.    Physical Exam:  I/O this shift:  In: -   Out: 200 [Chest Tube:200]    Intake/Output Summary

## 2024-01-07 LAB
BASOPHILS # BLD: 0.03 K/UL (ref 0–0.2)
BASOPHILS NFR BLD: 0 % (ref 0–2)
EOSINOPHIL # BLD: 0.2 K/UL (ref 0.05–0.5)
EOSINOPHILS RELATIVE PERCENT: 2 % (ref 0–6)
ERYTHROCYTE [DISTWIDTH] IN BLOOD BY AUTOMATED COUNT: 12.6 % (ref 11.5–15)
HCT VFR BLD AUTO: 38.4 % (ref 37–54)
HGB BLD-MCNC: 13.3 G/DL (ref 12.5–16.5)
IMM GRANULOCYTES # BLD AUTO: 0.09 K/UL (ref 0–0.58)
IMM GRANULOCYTES NFR BLD: 1 % (ref 0–5)
LYMPHOCYTES NFR BLD: 1.8 K/UL (ref 1.5–4)
LYMPHOCYTES RELATIVE PERCENT: 19 % (ref 20–42)
MCH RBC QN AUTO: 32.1 PG (ref 26–35)
MCHC RBC AUTO-ENTMCNC: 34.6 G/DL (ref 32–34.5)
MCV RBC AUTO: 92.8 FL (ref 80–99.9)
MONOCYTES NFR BLD: 0.94 K/UL (ref 0.1–0.95)
MONOCYTES NFR BLD: 10 % (ref 2–12)
NEUTROPHILS NFR BLD: 68 % (ref 43–80)
NEUTS SEG NFR BLD: 6.61 K/UL (ref 1.8–7.3)
PLATELET # BLD AUTO: 163 K/UL (ref 130–450)
PMV BLD AUTO: 10.3 FL (ref 7–12)
RBC # BLD AUTO: 4.14 M/UL (ref 3.8–5.8)
WBC OTHER # BLD: 9.7 K/UL (ref 4.5–11.5)

## 2024-01-07 PROCEDURE — 6370000000 HC RX 637 (ALT 250 FOR IP): Performed by: INTERNAL MEDICINE

## 2024-01-07 PROCEDURE — 6360000002 HC RX W HCPCS: Performed by: INTERNAL MEDICINE

## 2024-01-07 PROCEDURE — 85025 COMPLETE CBC W/AUTO DIFF WBC: CPT

## 2024-01-07 PROCEDURE — 2060000000 HC ICU INTERMEDIATE R&B

## 2024-01-07 PROCEDURE — 6370000000 HC RX 637 (ALT 250 FOR IP)

## 2024-01-07 PROCEDURE — 2580000003 HC RX 258

## 2024-01-07 PROCEDURE — 36415 COLL VENOUS BLD VENIPUNCTURE: CPT

## 2024-01-07 RX ADMIN — DOCUSATE SODIUM 100 MG: 100 CAPSULE, LIQUID FILLED ORAL at 08:55

## 2024-01-07 RX ADMIN — PANTOPRAZOLE SODIUM 40 MG: 40 TABLET, DELAYED RELEASE ORAL at 07:13

## 2024-01-07 RX ADMIN — PROPRANOLOL HYDROCHLORIDE 20 MG: 10 TABLET ORAL at 20:16

## 2024-01-07 RX ADMIN — Medication 10 ML: at 20:17

## 2024-01-07 RX ADMIN — PROPRANOLOL HYDROCHLORIDE 20 MG: 10 TABLET ORAL at 08:55

## 2024-01-07 RX ADMIN — Medication 10 ML: at 08:56

## 2024-01-07 RX ADMIN — ENOXAPARIN SODIUM 40 MG: 100 INJECTION SUBCUTANEOUS at 08:55

## 2024-01-07 RX ADMIN — PROPRANOLOL HYDROCHLORIDE 20 MG: 10 TABLET ORAL at 14:38

## 2024-01-07 ASSESSMENT — PAIN SCALES - GENERAL: PAINLEVEL_OUTOF10: 0

## 2024-01-07 NOTE — PROGRESS NOTES
Internal Medicine Progress Note    [facility]    Jonnathan Pollock D.O., CICI Hutchins D.O., CICI Arellano D.O.    Joan Rai, MSN, APRN, NP-C  Chavez Harmon, MSN, APRN-CNP  Reid Oliveros, MSN, APRN, NP-C     Primary Care Physician: Todd Cross DO   Admitting Physician:  Jonnathan Pollock DO  Admission date and time: 12/28/2023  8:08 PM    Room:  [address] [place] [address]  Admitting diagnosis: Hyponatremia [E87.1]  Pleural effusion [J90]    Patient Name: Wilmre Fuentes  MRN: [id number]    Date of Service: 1/7/2024     Subjective:  Wilmer is a 78 y.o. male who was seen and examined today,1/7/2024, at the bedside.  Patient seems to be breathing easy.  Currently off oxygen.  Plan is to exchange the chest tube for UreSil.  Plan on possible Mediport tomorrow anticipating the need for chemotherapy.    Wife present at the bedside      Review of System:   Constitutional:   Stable malaise and fatigue.  HEENT:   Denies ear pain, sore throat, sinus or eye problems.  Nasal cannula oxygen is in place.  Cardiovascular:   Denies any chest pain, irregular heartbeats, or palpitations.   Respiratory:   Improving shortness of breath.  Less orthopnea overall.  Gastrointestinal:   Decreased appetite and therefore decreased oral intake.    Genitourinary:    Denies any urgency, frequency, hematuria. Voiding without difficulty.  Extremities:   Denies lower extremity swelling, edema or cyanosis.   Neurology:    Denies any headache or focal neurological deficits, ongoing weakness and deconditioning.  Psch:   Denies being anxious or depressed.  Musculoskeletal:    Denies  myalgias, joint complaints or back pain.   Integumentary:   Denies any rashes, ulcers, or excoriations.  Denies bruising.  Hematologic/Lymphatic:  Denies bruising or bleeding.    Physical Exam:  I/O this shift:  In: -   Out: 320 [Urine:200; Chest Tube:120]    Intake/Output Summary (Last 24

## 2024-01-08 ENCOUNTER — ANESTHESIA EVENT (OUTPATIENT)
Dept: OPERATING ROOM | Age: 79
DRG: 180 | End: 2024-01-08
Payer: MEDICARE

## 2024-01-08 LAB
ANION GAP SERPL CALCULATED.3IONS-SCNC: 7 MMOL/L (ref 7–16)
BASOPHILS # BLD: 0.02 K/UL (ref 0–0.2)
BASOPHILS NFR BLD: 0 % (ref 0–2)
BUN SERPL-MCNC: 12 MG/DL (ref 6–23)
CALCIUM SERPL-MCNC: 8.3 MG/DL (ref 8.6–10.2)
CHLORIDE SERPL-SCNC: 100 MMOL/L (ref 98–107)
CO2 SERPL-SCNC: 28 MMOL/L (ref 22–29)
CREAT SERPL-MCNC: 0.8 MG/DL (ref 0.7–1.2)
EOSINOPHIL # BLD: 0.16 K/UL (ref 0.05–0.5)
EOSINOPHILS RELATIVE PERCENT: 2 % (ref 0–6)
ERYTHROCYTE [DISTWIDTH] IN BLOOD BY AUTOMATED COUNT: 12.7 % (ref 11.5–15)
GFR SERPL CREATININE-BSD FRML MDRD: >60 ML/MIN/1.73M2
GLUCOSE SERPL-MCNC: 99 MG/DL (ref 74–99)
HCT VFR BLD AUTO: 35.2 % (ref 37–54)
HGB BLD-MCNC: 12 G/DL (ref 12.5–16.5)
IMM GRANULOCYTES # BLD AUTO: 0.06 K/UL (ref 0–0.58)
IMM GRANULOCYTES NFR BLD: 1 % (ref 0–5)
LYMPHOCYTES NFR BLD: 1.52 K/UL (ref 1.5–4)
LYMPHOCYTES RELATIVE PERCENT: 18 % (ref 20–42)
MAGNESIUM SERPL-MCNC: 2 MG/DL (ref 1.6–2.6)
MCH RBC QN AUTO: 31.8 PG (ref 26–35)
MCHC RBC AUTO-ENTMCNC: 34.1 G/DL (ref 32–34.5)
MCV RBC AUTO: 93.4 FL (ref 80–99.9)
MONOCYTES NFR BLD: 0.95 K/UL (ref 0.1–0.95)
MONOCYTES NFR BLD: 11 % (ref 2–12)
NEUTROPHILS NFR BLD: 68 % (ref 43–80)
NEUTS SEG NFR BLD: 5.87 K/UL (ref 1.8–7.3)
PLATELET # BLD AUTO: 184 K/UL (ref 130–450)
PMV BLD AUTO: 9.5 FL (ref 7–12)
POTASSIUM SERPL-SCNC: 3.5 MMOL/L (ref 3.5–5)
RBC # BLD AUTO: 3.77 M/UL (ref 3.8–5.8)
SODIUM SERPL-SCNC: 135 MMOL/L (ref 132–146)
WBC OTHER # BLD: 8.6 K/UL (ref 4.5–11.5)

## 2024-01-08 PROCEDURE — 2060000000 HC ICU INTERMEDIATE R&B

## 2024-01-08 PROCEDURE — 80048 BASIC METABOLIC PNL TOTAL CA: CPT

## 2024-01-08 PROCEDURE — 6370000000 HC RX 637 (ALT 250 FOR IP)

## 2024-01-08 PROCEDURE — 97530 THERAPEUTIC ACTIVITIES: CPT | Performed by: PHYSICAL THERAPIST

## 2024-01-08 PROCEDURE — 83735 ASSAY OF MAGNESIUM: CPT

## 2024-01-08 PROCEDURE — 36415 COLL VENOUS BLD VENIPUNCTURE: CPT

## 2024-01-08 PROCEDURE — 6370000000 HC RX 637 (ALT 250 FOR IP): Performed by: INTERNAL MEDICINE

## 2024-01-08 PROCEDURE — 85025 COMPLETE CBC W/AUTO DIFF WBC: CPT

## 2024-01-08 PROCEDURE — 99231 SBSQ HOSP IP/OBS SF/LOW 25: CPT | Performed by: INTERNAL MEDICINE

## 2024-01-08 PROCEDURE — 2580000003 HC RX 258

## 2024-01-08 PROCEDURE — 6360000002 HC RX W HCPCS: Performed by: INTERNAL MEDICINE

## 2024-01-08 RX ADMIN — DOCUSATE SODIUM 100 MG: 100 CAPSULE, LIQUID FILLED ORAL at 09:21

## 2024-01-08 RX ADMIN — Medication 10 ML: at 20:16

## 2024-01-08 RX ADMIN — Medication 10 ML: at 09:22

## 2024-01-08 RX ADMIN — PROPRANOLOL HYDROCHLORIDE 20 MG: 10 TABLET ORAL at 14:49

## 2024-01-08 RX ADMIN — PANTOPRAZOLE SODIUM 40 MG: 40 TABLET, DELAYED RELEASE ORAL at 05:40

## 2024-01-08 RX ADMIN — PROPRANOLOL HYDROCHLORIDE 20 MG: 10 TABLET ORAL at 20:15

## 2024-01-08 RX ADMIN — ENOXAPARIN SODIUM 40 MG: 100 INJECTION SUBCUTANEOUS at 09:21

## 2024-01-08 RX ADMIN — PROPRANOLOL HYDROCHLORIDE 20 MG: 10 TABLET ORAL at 09:21

## 2024-01-08 NOTE — CARE COORDINATION
1/8/24 1034 CM note: RSV (+) 12/29/23. Room air. L thoracentesis w/ 1000 cc off 12/29/23, L chest tube [id number]/31/23, and bone lesion bx done 1/4/24. Plan for mediport placement and L pleurx catheter insertion. Discharge plan is home. Discussed need for [facility] for pleurx catheter monitoring/teaching and patient is agreeable, no preference to agency. Referral given to Brandi Leblanc liaison, and awaiting her review. WILL NEED ORDERS FOR FREQUENCY/AMOUNT TO DRAIN FROM PLEURX CATHETER. CM will follow. Electronically signed by Karol Davenport RN on 1/8/2024 at 10:48 AM    Per Deana, [facility], they can accept. WILL NEED HHC ORDERS. Electronically signed by Karol Davenport RN on 1/8/2024 at 11:36 AM

## 2024-01-08 NOTE — PROGRESS NOTES
Internal Medicine Progress Note    [facility]    Jonnathan Pollock D.O., CICI Hutchins D.O., CICI Arellano D.O.    Joan Rai, MSN, APRN, NP-C  Chavez Harmon, MSN, APRN-CNP  Reid Oliveros, MSN, APRN, NP-C     Primary Care Physician: Todd Cross DO   Admitting Physician:  Jonnathan Pollock DO  Admission date and time: 12/28/2023  8:08 PM    Room:  [address] [place] [address]  Admitting diagnosis: Hyponatremia [E87.1]  Pleural effusion [J90]    Patient Name: Wilmer Fuentes  MRN: [id number]    Date of Service: 1/8/2024     Subjective:  Wilmer is a 78 y.o. male who was seen and examined today,1/8/2024, at the bedside. Wilmer is resting comfortably during my examination today.  Chest tube remains in place and plans are to transition to a Pleurx catheter tomorrow.  Mediport will also be placed tomorrow.  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  We are awaiting results of bone biopsy as multiple subspecialist continue to follow.  He remains frustrated with ongoing hospitalization but is very appreciative of the care he is receiving.      Review of System:   Constitutional:   Improving malaise and fatigue.  HEENT:   Denies ear pain, sore throat, sinus or eye problems.  Nasal cannula oxygen has been weaned off.  Cardiovascular:   Denies any chest pain, irregular heartbeats, or palpitations.   Respiratory:   Shortness of breath is stable.  There is some mild pain on inspiration related to the chest tube as to be expected.  Gastrointestinal:   Increased appetite with increased oral intake.  Genitourinary:    Denies any urgency, frequency, hematuria. Voiding without difficulty.  Extremities:   Denies lower extremity swelling, edema or cyanosis.   Neurology:    Denies any headache or focal neurological deficits, ongoing weakness and deconditioning.  Psch:   Denies being anxious or depressed.  Musculoskeletal:   IntraVENous 2 times per day    pantoprazole  40 mg Oral QAM AC    enoxaparin  40 mg SubCUTAneous Daily     Continuous Infusions:   sodium chloride         Objective Data:  CBC with Differential:    Lab Results   Component Value Date/Time    WBC 8.6 01/08/2024 06:45 AM    RBC 3.77 01/08/2024 06:45 AM    HGB 12.0 01/08/2024 06:45 AM    HCT 35.2 01/08/2024 06:45 AM     01/08/2024 06:45 AM    MCV 93.4 01/08/2024 06:45 AM    MCH 31.8 01/08/2024 06:45 AM    MCHC 34.1 01/08/2024 06:45 AM    RDW 12.7 01/08/2024 06:45 AM    SEGSPCT 39 05/07/2013 09:00 AM    LYMPHOPCT 18 01/08/2024 06:45 AM    MONOPCT 11 01/08/2024 06:45 AM    BASOPCT 0 01/08/2024 06:45 AM    MONOSABS 0.95 01/08/2024 06:45 AM    LYMPHSABS 1.52 01/08/2024 06:45 AM    EOSABS 0.16 01/08/2024 06:45 AM    BASOSABS 0.02 01/08/2024 06:45 AM     BMP:    Lab Results   Component Value Date/Time     01/08/2024 06:45 AM    K 3.5 01/08/2024 06:45 AM     01/08/2024 06:45 AM    CO2 28 01/08/2024 06:45 AM    BUN 12 01/08/2024 06:45 AM    LABALBU 3.2 12/30/2023 05:55 AM    LABALBU 4.1 10/06/2011 08:35 AM    CREATININE 0.8 01/08/2024 06:45 AM    CALCIUM 8.3 01/08/2024 06:45 AM    GFRAA >60 08/05/2021 08:42 AM    LABGLOM >60 01/08/2024 06:45 AM    GLUCOSE 99 01/08/2024 06:45 AM    GLUCOSE 88 10/06/2011 08:35 AM       Assessment:  Diffusely metastatic process resulting in malignant pleural effusion status post chest tube placement with preliminary pleural fluid cytology suggesting adenocarcinoma of unknown primary  Hyponatremia compatible with SIADH  Non-ST elevated myocardial infarction compatible with demand ischemia  Viral infection with RSV  Essential hypertension  Gastroesophageal reflux disease    Plan:   Chest tube has been clamped and plans are for downsizing to a Pleurx catheter tomorrow.  This is capable of being drained on an outpatient basis. Mediport will also be placed tomorrow.  We are awaiting bone biopsy to better clarify origin of the

## 2024-01-08 NOTE — PROGRESS NOTES
[facility]  Hematology/Oncology  Consult      Patient Name: Wilmer Fuentes  YOB: 1945  PCP: Todd Cross DO   Referring Provider:      Reason for Consultation:   Chief Complaint   Patient presents with    Shortness of Breath     Was at [facility] today and pcp called pt to go to ed, cough couple weeks, no n/v/d, no fever or chills        History of Present Illness: 78-year-old man with past medical history relevant for hypertension, hyperlipidemia and anxiety hospitalized with progressively worsening dyspnea and leg edema.  No fever or chest pain nausea vomiting or hemoptysis.  His BMP on admission showed hyponatremia.  Serum creatinine was normal.  Hepatic panel with hypoalbuminemia with elevated alk phos with normal transaminases and normal bilirubin.  CBC with a hemoglobin of 13 with normal platelet and neutrophilic leukocytosis related to Solumedrol .  PSA was elevated at 20.9.  CEA was 5.0 with minimally elevated  at 37  Chest x-ray and CTA of the chest showed a large left pleural effusion with partial collapse of left upper lobe.  There was groundglass opacities in the right lung apex suggestive of infectious inflammatory process.  Suspicious sclerotic foci at T10 and T11 vertebral bodies noted which may represent metastatic disease .  Bone scans show multiple focal areas of increased activity suspicious for metastatic disease most prominent in the right posterior calvarium and thoracolumbar spine as well as left posterior iliac wing and right hip intertrochanteric area.  CT abdomen, pelvis and CT head pending  Left-sided thoracentesis performed and fluid was exudative with markedly elevated total proteins and LDH highly consistent with malignant effusion and cytology pending.        Diagnostic Data:     Past Medical History:   Diagnosis Date    Anxiety     Hyperlipidemia     Hypertension        Patient Active Problem List    Diagnosis Date Noted    Pleural effusion 12/31/2023

## 2024-01-08 NOTE — PROGRESS NOTES
Physical Therapy    Physical Therapy Treatment Note/Plan of Care    Room #:  0517/0517-01  Patient Name: Wilmer Fuentes  YOB: 1945  MRN: [id number]    Date of Service: 1/8/2024     Tentative placement recommendation: Home with Home Health Physical Therapy  Equipment recommendation: Patient has needed equipment       Evaluating Physical Therapist: Rey Donaldson, PT  #[id number]      Specific Provider Orders/Date/Referring Provider :     PT eval and treat  Start:  12/30/23 1045,   End:  12/30/23 1045,   ONE TIME,   Standing Count:  1 Occurrences,   R       Brown Bennett MD    Admitting Diagnosis:   Hyponatremia [E87.1]  Pleural effusion [J90]    Admitted with    cough, shortness of breath , pleural effusion, bilateral lower extremities swelling  Surgery: none  Visit Diagnoses         Codes    Postprocedural pneumothorax     J95.811            Patient Active Problem List   Diagnosis    Hyponatremia    Metastatic cancer (HCC)    Pleural effusion        ASSESSMENT of Current Deficits Patient exhibits decreased strength, balance, endurance, and pain right shoulder and chest tube insertion  impairing functional mobility, transfers, gait , gait distance, and tolerance to activity are barriers to d/c and require skilled intervention to address concerns listed above to increase safety and independence at discharge.   Decreased strength, balance and endurance  increases patient's risk for fall.   Pt mildly unsteady with ambulation with no LOB, dizziness, with mild SOB following ambulation. Pt agreeable to incentive spirometer following function with VC for technique.       PHYSICAL THERAPY  PLAN OF CARE       Physical therapy plan of care is established based on physician order,  patient diagnosis and clinical assessment    Current Treatment Recommendations:    -Bed Mobility: Lower extremity exercises , Upper extremity exercises , and Trunk control activities   -Standing Balance: Perform strengthening  exercises in standing to promote motor control with or without upper extremity support   -Transfers: Provide instruction on proper hand and foot position for adequate transfer of weight onto lower extremities and use of gait device if needed and Cues for hand placement, technique and safety. Provide stabilization to prevent fall   -Gait: Gait training and Standing activities to improve: base of support, weight shift, weight bearing    -Endurance: Utilize Supervised activities to increase level of endurance to allow for safe functional mobility including transfers and gait   -Stairs: Stair training with instruction on proper technique and hand placement on rail  -Instruction in independent management of O2 line  -Positioning for skin integrity and comfort    PT long term treatment goals are located in below grid    Patient and or family understand(s) diagnosis, prognosis, and plan of care.    Frequency of treatments: Patient will be seen  daily.         Prior Level of Function: Patient ambulated independently with cane  Rehab Potential: good   for baseline    Past medical history:   Past Medical History:   Diagnosis Date    Anxiety     Hyperlipidemia     Hypertension      Past Surgical History:   Procedure Laterality Date    ADRENALECTOMY      COLONOSCOPY      IR BIOPSY PERCUTANEOUS DEEP BONE  1/4/2024    IR BIOPSY PERCUTANEOUS DEEP BONE 1/4/2024 [id number] SPECIAL PROCEDURES    PROSTATE SURGERY      UPPER GASTROINTESTINAL ENDOSCOPY         SUBJECTIVE:    Precautions: , falls and alarm ,   2 Liters of o2 via nasal cannula , contact/droplet    Social history: Patient lives with spouse in a ranch home  with  1+3  steps  to enter home.        Equipment owned: Nikos,       AM-PAC Basic Mobility        AM-PAC Basic Mobility - Inpatient   How much help is needed turning from your back to your side while in a flat bed without using bedrails?: A Little  How much help is needed moving from lying on your back to sitting on the side

## 2024-01-08 NOTE — PROGRESS NOTES
ICU Intensivist Attestation:    I saw, examined, and discussed the patient with   Blanca TAVARES and agree with her assessment and plan.    I have examined the patient and gone over his plan with CHAITANYA Russell.,  The patient is doing quite well and is awaiting his port insertion as well as his Pleurx catheter for recurrent malignant pleural effusion presumably from prostate cancer.    The patient is in good spirits and is not experiencing any shortness of breath.  The catheter should be placed tomorrow so that further treatment and plans for discharge can be contemplated.    Electronically signed by Fabrizio Johnson MD on 1/8/2024 at 7:19 PM    [facility]  Department of Internal Medicine  Division of Pulmonary, Critical Care and Sleep Medicine  Consult Note    Peter Cesar DO, MPH, FCCP, FACOI, FACP  Debra Slade DO, [facility]  MD Adolfo Dougherty MD David Weiner, MD Bridget Nance, APRN    PRIMARY PULMONOLOGIST: NONE    SUBJECTIVE: We are following Mr. Wilmer Fuentes for malignant pleural effusion. He is awake and alert on examination, remains on room air. Awaiting placement of Pleurx catheter and mediport.    OBJECTIVE:     PHYSICAL EXAM:   VITALS:   Vitals:    01/07/24 2015 01/07/24 2326 01/08/24 0530 01/08/24 0911   BP: 128/67 (!) 141/80 124/75 132/84   Pulse: 92 79 82 88   Resp: 20 20 20 16   Temp: 97.2 °F (36.2 °C) 97.4 °F (36.3 °C) 98.1 °F (36.7 °C) 97.6 °F (36.4 °C)   TempSrc: Temporal Infrared Oral Temporal   SpO2: 95% 98% 94% 92%   Weight:       Height:            Intake/Output Summary (Last 24 hours) at 1/8/2024 1341  Last data filed at 1/8/2024 0541  Gross per 24 hour   Intake --   Output 370 ml   Net -370 ml        CONSTITUTIONAL:   A&O x 3, NAD  SKIN:     No rash, No suspicious lesions, No skin discoloration  HEENT:     EOMI, MMM, No thrush  NECK:    No bruits, No JVP appreciated  CV:      Sinus,  No murmur, No rubs, No  metastases, awaiting results of bone biopsy  Elevated alkaline phosphatase  Nontraumatic rib fractures, pathological etiology is possible  Elevated troponin possible NSTEMI versus demand ischemia  Hyperlipidemia  Systemic hypertension        Plan:     Pleural effusion is exudative.  Cytology is positive for adenocarcinoma  Left-sided chest tube inserted for large pleural effusion.  Continue with chest tube to suction -30.  And continues to have significant chest tube output. IR is consulted to convert chest tube to a Pleurx catheter  Ceftriaxone and doxycycline, completed  CT abdomen and pelvis / CT head reviewed   Prophylactic DVT treatment  Nephrology is managing hyponatremia  Monitor blood pressure off amlodipine   As needed Toradol for pain  CT scan of the chest January 4, 2024 is suggestive of entrapped lung.      Case and plan discussed with Dr. Elizabeth Chandler, APRN - CNP

## 2024-01-08 NOTE — ANESTHESIA PRE PROCEDURE
01/08/24 1445   BP: (!) 141/80 124/75 132/84 136/75   Pulse: 79 82 88 78   Resp: 20 20 16 20   Temp: 97.4 °F (36.3 °C) 98.1 °F (36.7 °C) 97.6 °F (36.4 °C) 97.4 °F (36.3 °C)   TempSrc: Infrared Oral Temporal Temporal   SpO2: 98% 94% 92% 100%   Weight:       Height:                                                  BP Readings from Last 3 Encounters:   01/08/24 136/75   06/22/23 (!) 145/80   04/12/22 (!) 148/68       NPO Status:                                                                                 BMI:   Wt Readings from Last 3 Encounters:   12/31/23 89.6 kg (197 lb 8 oz)   06/22/23 88.9 kg (196 lb)   04/12/22 88.5 kg (195 lb)     Body mass index is 27.56 kg/m².    CBC:   Lab Results   Component Value Date/Time    WBC 8.6 01/08/2024 06:45 AM    RBC 3.77 01/08/2024 06:45 AM    HGB 12.0 01/08/2024 06:45 AM    HCT 35.2 01/08/2024 06:45 AM    MCV 93.4 01/08/2024 06:45 AM    RDW 12.7 01/08/2024 06:45 AM     01/08/2024 06:45 AM       CMP:   Lab Results   Component Value Date/Time     01/08/2024 06:45 AM    K 3.5 01/08/2024 06:45 AM     01/08/2024 06:45 AM    CO2 28 01/08/2024 06:45 AM    BUN 12 01/08/2024 06:45 AM    CREATININE 0.8 01/08/2024 06:45 AM    GFRAA >60 08/05/2021 08:42 AM    LABGLOM >60 01/08/2024 06:45 AM    GLUCOSE 99 01/08/2024 06:45 AM    GLUCOSE 88 10/06/2011 08:35 AM    PROT 6.2 12/30/2023 05:55 AM    CALCIUM 8.3 01/08/2024 06:45 AM    BILITOT 0.2 12/30/2023 05:55 AM    ALKPHOS 378 12/30/2023 05:55 AM    AST 17 12/30/2023 05:55 AM    ALT 12 12/30/2023 05:55 AM       POC Tests: No results for input(s): \"POCGLU\", \"POCNA\", \"POCK\", \"POCCL\", \"POCBUN\", \"POCHEMO\", \"POCHCT\" in the last 72 hours.    Coags:   Lab Results   Component Value Date/Time    PROTIME 15.1 12/29/2023 06:48 AM    INR 1.3 12/29/2023 06:48 AM       HCG (If Applicable): No results found for: \"PREGTESTUR\", \"PREGSERUM\", \"HCG\", \"HCGQUANT\"     ABGs: No results found for: \"PHART\", \"PO2ART\", \"TZA9RGU\", \"PHV6YHV\", \"BEART\",

## 2024-01-09 ENCOUNTER — ANESTHESIA (OUTPATIENT)
Dept: OPERATING ROOM | Age: 79
DRG: 180 | End: 2024-01-09
Payer: MEDICARE

## 2024-01-09 ENCOUNTER — APPOINTMENT (OUTPATIENT)
Dept: GENERAL RADIOLOGY | Age: 79
DRG: 180 | End: 2024-01-09
Payer: MEDICARE

## 2024-01-09 ENCOUNTER — APPOINTMENT (OUTPATIENT)
Dept: INTERVENTIONAL RADIOLOGY/VASCULAR | Age: 79
DRG: 180 | End: 2024-01-09
Payer: MEDICARE

## 2024-01-09 PROBLEM — I87.8 POOR VENOUS ACCESS: Status: ACTIVE | Noted: 2024-01-09

## 2024-01-09 LAB
MICROORGANISM SPEC CULT: NORMAL
MICROORGANISM/AGENT SPEC: NORMAL
SPECIMEN DESCRIPTION: NORMAL

## 2024-01-09 PROCEDURE — 3600000013 HC SURGERY LEVEL 3 ADDTL 15MIN: Performed by: SURGERY

## 2024-01-09 PROCEDURE — 2060000000 HC ICU INTERMEDIATE R&B

## 2024-01-09 PROCEDURE — 2500000003 HC RX 250 WO HCPCS: Performed by: SURGERY

## 2024-01-09 PROCEDURE — 2580000003 HC RX 258

## 2024-01-09 PROCEDURE — 71045 X-RAY EXAM CHEST 1 VIEW: CPT

## 2024-01-09 PROCEDURE — 6370000000 HC RX 637 (ALT 250 FOR IP)

## 2024-01-09 PROCEDURE — 02HV33Z INSERTION OF INFUSION DEVICE INTO SUPERIOR VENA CAVA, PERCUTANEOUS APPROACH: ICD-10-PCS | Performed by: SURGERY

## 2024-01-09 PROCEDURE — C1788 PORT, INDWELLING, IMP: HCPCS | Performed by: SURGERY

## 2024-01-09 PROCEDURE — 2580000003 HC RX 258: Performed by: SURGERY

## 2024-01-09 PROCEDURE — 3700000001 HC ADD 15 MINUTES (ANESTHESIA): Performed by: SURGERY

## 2024-01-09 PROCEDURE — A4216 STERILE WATER/SALINE, 10 ML: HCPCS | Performed by: SURGERY

## 2024-01-09 PROCEDURE — C1881 DIALYSIS ACCESS SYSTEM: HCPCS | Performed by: SURGERY

## 2024-01-09 PROCEDURE — 0JH60WZ INSERTION OF TOTALLY IMPLANTABLE VASCULAR ACCESS DEVICE INTO CHEST SUBCUTANEOUS TISSUE AND FASCIA, OPEN APPROACH: ICD-10-PCS | Performed by: SURGERY

## 2024-01-09 PROCEDURE — 3700000000 HC ANESTHESIA ATTENDED CARE: Performed by: SURGERY

## 2024-01-09 PROCEDURE — 3600000003 HC SURGERY LEVEL 3 BASE: Performed by: SURGERY

## 2024-01-09 PROCEDURE — 6360000002 HC RX W HCPCS: Performed by: SURGERY

## 2024-01-09 PROCEDURE — 6370000000 HC RX 637 (ALT 250 FOR IP): Performed by: INTERNAL MEDICINE

## 2024-01-09 PROCEDURE — 0JH63XZ INSERTION OF TUNNELED VASCULAR ACCESS DEVICE INTO CHEST SUBCUTANEOUS TISSUE AND FASCIA, PERCUTANEOUS APPROACH: ICD-10-PCS | Performed by: SURGERY

## 2024-01-09 PROCEDURE — 6360000002 HC RX W HCPCS

## 2024-01-09 PROCEDURE — 2709999900 HC NON-CHARGEABLE SUPPLY: Performed by: SURGERY

## 2024-01-09 PROCEDURE — 7100000000 HC PACU RECOVERY - FIRST 15 MIN: Performed by: SURGERY

## 2024-01-09 PROCEDURE — 99222 1ST HOSP IP/OBS MODERATE 55: CPT | Performed by: SURGERY

## 2024-01-09 PROCEDURE — 36561 INSERT TUNNELED CV CATH: CPT | Performed by: SURGERY

## 2024-01-09 PROCEDURE — 2700000000 HC OXYGEN THERAPY PER DAY

## 2024-01-09 PROCEDURE — 7100000001 HC PACU RECOVERY - ADDTL 15 MIN: Performed by: SURGERY

## 2024-01-09 PROCEDURE — 0W9B30Z DRAINAGE OF LEFT PLEURAL CAVITY WITH DRAINAGE DEVICE, PERCUTANEOUS APPROACH: ICD-10-PCS | Performed by: INTERNAL MEDICINE

## 2024-01-09 DEVICE — SMART PORT CT SINGLE TITANIUM PORT SYSTEM WITH ATTACHABLE 8.0F X 66CM POLYURETHANE CATHETER AND 8 F INTRODUCER KIT (WITH VALVED INTRODUCER)
Type: IMPLANTABLE DEVICE | Site: CHEST | Status: FUNCTIONAL
Brand: SMART PORT CT

## 2024-01-09 RX ORDER — GLYCOPYRROLATE 0.2 MG/ML
INJECTION INTRAMUSCULAR; INTRAVENOUS PRN
Status: DISCONTINUED | OUTPATIENT
Start: 2024-01-09 | End: 2024-01-09 | Stop reason: SDUPTHER

## 2024-01-09 RX ORDER — DIPHENHYDRAMINE HYDROCHLORIDE 50 MG/ML
12.5 INJECTION INTRAMUSCULAR; INTRAVENOUS
Status: DISCONTINUED | OUTPATIENT
Start: 2024-01-09 | End: 2024-01-09 | Stop reason: HOSPADM

## 2024-01-09 RX ORDER — PROCHLORPERAZINE EDISYLATE 5 MG/ML
5 INJECTION INTRAMUSCULAR; INTRAVENOUS
Status: DISCONTINUED | OUTPATIENT
Start: 2024-01-09 | End: 2024-01-09 | Stop reason: HOSPADM

## 2024-01-09 RX ORDER — CEFAZOLIN 2 G/1
INJECTION, POWDER, FOR SOLUTION INTRAMUSCULAR; INTRAVENOUS PRN
Status: DISCONTINUED | OUTPATIENT
Start: 2024-01-09 | End: 2024-01-09 | Stop reason: SDUPTHER

## 2024-01-09 RX ORDER — MEPERIDINE HYDROCHLORIDE 25 MG/ML
12.5 INJECTION INTRAMUSCULAR; INTRAVENOUS; SUBCUTANEOUS EVERY 5 MIN PRN
Status: DISCONTINUED | OUTPATIENT
Start: 2024-01-09 | End: 2024-01-09 | Stop reason: HOSPADM

## 2024-01-09 RX ORDER — LIDOCAINE HYDROCHLORIDE 20 MG/ML
INJECTION, SOLUTION INTRAVENOUS PRN
Status: DISCONTINUED | OUTPATIENT
Start: 2024-01-09 | End: 2024-01-09 | Stop reason: SDUPTHER

## 2024-01-09 RX ORDER — HALOPERIDOL 5 MG/ML
1 INJECTION INTRAMUSCULAR
Status: DISCONTINUED | OUTPATIENT
Start: 2024-01-09 | End: 2024-01-09 | Stop reason: HOSPADM

## 2024-01-09 RX ORDER — SODIUM CHLORIDE 0.9 % (FLUSH) 0.9 %
5-40 SYRINGE (ML) INJECTION PRN
Status: DISCONTINUED | OUTPATIENT
Start: 2024-01-09 | End: 2024-01-09 | Stop reason: HOSPADM

## 2024-01-09 RX ORDER — HEPARIN 100 UNIT/ML
SYRINGE INTRAVENOUS PRN
Status: DISCONTINUED | OUTPATIENT
Start: 2024-01-09 | End: 2024-01-09 | Stop reason: ALTCHOICE

## 2024-01-09 RX ORDER — PROPOFOL 10 MG/ML
INJECTION, EMULSION INTRAVENOUS CONTINUOUS PRN
Status: DISCONTINUED | OUTPATIENT
Start: 2024-01-09 | End: 2024-01-09 | Stop reason: SDUPTHER

## 2024-01-09 RX ORDER — LABETALOL HYDROCHLORIDE 5 MG/ML
10 INJECTION, SOLUTION INTRAVENOUS
Status: DISCONTINUED | OUTPATIENT
Start: 2024-01-09 | End: 2024-01-09 | Stop reason: HOSPADM

## 2024-01-09 RX ORDER — SODIUM CHLORIDE, SODIUM LACTATE, POTASSIUM CHLORIDE, CALCIUM CHLORIDE 600; 310; 30; 20 MG/100ML; MG/100ML; MG/100ML; MG/100ML
INJECTION, SOLUTION INTRAVENOUS CONTINUOUS PRN
Status: DISCONTINUED | OUTPATIENT
Start: 2024-01-09 | End: 2024-01-09 | Stop reason: SDUPTHER

## 2024-01-09 RX ORDER — SODIUM CHLORIDE 9 MG/ML
INJECTION INTRAVENOUS PRN
Status: DISCONTINUED | OUTPATIENT
Start: 2024-01-09 | End: 2024-01-09 | Stop reason: ALTCHOICE

## 2024-01-09 RX ORDER — FENTANYL CITRATE 50 UG/ML
INJECTION, SOLUTION INTRAMUSCULAR; INTRAVENOUS PRN
Status: DISCONTINUED | OUTPATIENT
Start: 2024-01-09 | End: 2024-01-09 | Stop reason: SDUPTHER

## 2024-01-09 RX ORDER — SODIUM CHLORIDE 0.9 % (FLUSH) 0.9 %
5-40 SYRINGE (ML) INJECTION EVERY 12 HOURS SCHEDULED
Status: DISCONTINUED | OUTPATIENT
Start: 2024-01-09 | End: 2024-01-09 | Stop reason: HOSPADM

## 2024-01-09 RX ORDER — SODIUM CHLORIDE 9 MG/ML
INJECTION, SOLUTION INTRAVENOUS PRN
Status: DISCONTINUED | OUTPATIENT
Start: 2024-01-09 | End: 2024-01-09 | Stop reason: HOSPADM

## 2024-01-09 RX ORDER — KETOROLAC TROMETHAMINE 15 MG/ML
15 INJECTION, SOLUTION INTRAMUSCULAR; INTRAVENOUS ONCE
Status: DISCONTINUED | OUTPATIENT
Start: 2024-01-09 | End: 2024-01-09 | Stop reason: HOSPADM

## 2024-01-09 RX ORDER — HYDROMORPHONE HYDROCHLORIDE 1 MG/ML
0.25 INJECTION, SOLUTION INTRAMUSCULAR; INTRAVENOUS; SUBCUTANEOUS EVERY 5 MIN PRN
Status: DISCONTINUED | OUTPATIENT
Start: 2024-01-09 | End: 2024-01-09 | Stop reason: HOSPADM

## 2024-01-09 RX ORDER — HYDRALAZINE HYDROCHLORIDE 20 MG/ML
10 INJECTION INTRAMUSCULAR; INTRAVENOUS
Status: DISCONTINUED | OUTPATIENT
Start: 2024-01-09 | End: 2024-01-09 | Stop reason: HOSPADM

## 2024-01-09 RX ORDER — BUPIVACAINE HYDROCHLORIDE AND EPINEPHRINE 2.5; 5 MG/ML; UG/ML
INJECTION, SOLUTION EPIDURAL; INFILTRATION; INTRACAUDAL; PERINEURAL PRN
Status: DISCONTINUED | OUTPATIENT
Start: 2024-01-09 | End: 2024-01-09 | Stop reason: ALTCHOICE

## 2024-01-09 RX ORDER — IPRATROPIUM BROMIDE AND ALBUTEROL SULFATE 2.5; .5 MG/3ML; MG/3ML
1 SOLUTION RESPIRATORY (INHALATION)
Status: DISCONTINUED | OUTPATIENT
Start: 2024-01-09 | End: 2024-01-09 | Stop reason: HOSPADM

## 2024-01-09 RX ADMIN — DOCUSATE SODIUM 100 MG: 100 CAPSULE, LIQUID FILLED ORAL at 08:40

## 2024-01-09 RX ADMIN — PROPRANOLOL HYDROCHLORIDE 20 MG: 10 TABLET ORAL at 20:21

## 2024-01-09 RX ADMIN — LIDOCAINE HYDROCHLORIDE 100 MG: 20 INJECTION, SOLUTION INTRAVENOUS at 10:59

## 2024-01-09 RX ADMIN — PROPRANOLOL HYDROCHLORIDE 20 MG: 10 TABLET ORAL at 15:35

## 2024-01-09 RX ADMIN — FENTANYL CITRATE 50 MCG: 50 INJECTION, SOLUTION INTRAMUSCULAR; INTRAVENOUS at 11:03

## 2024-01-09 RX ADMIN — Medication 10 ML: at 08:57

## 2024-01-09 RX ADMIN — CEFAZOLIN 2 G: 2 INJECTION, POWDER, FOR SOLUTION INTRAMUSCULAR; INTRAVENOUS at 11:07

## 2024-01-09 RX ADMIN — PROPOFOL 75 MCG/KG/MIN: 10 INJECTION, EMULSION INTRAVENOUS at 10:59

## 2024-01-09 RX ADMIN — SODIUM CHLORIDE, POTASSIUM CHLORIDE, SODIUM LACTATE AND CALCIUM CHLORIDE: 600; 310; 30; 20 INJECTION, SOLUTION INTRAVENOUS at 10:53

## 2024-01-09 RX ADMIN — FENTANYL CITRATE 50 MCG: 50 INJECTION, SOLUTION INTRAMUSCULAR; INTRAVENOUS at 10:57

## 2024-01-09 RX ADMIN — PROPRANOLOL HYDROCHLORIDE 20 MG: 10 TABLET ORAL at 08:40

## 2024-01-09 RX ADMIN — PROPOFOL 50 MG: 10 INJECTION, EMULSION INTRAVENOUS at 10:58

## 2024-01-09 RX ADMIN — GLYCOPYRROLATE 0.2 MG: 0.2 INJECTION, SOLUTION INTRAMUSCULAR; INTRAVENOUS at 10:59

## 2024-01-09 NOTE — ANESTHESIA POSTPROCEDURE EVALUATION
Department of Anesthesiology  Postprocedure Note    Patient: Wilmer Fuentes  MRN: [id number]  YOB: 1945  Date of evaluation: 1/9/2024    Procedure Summary       Date: 01/09/24 Room / Location: [address]    Anesthesia Start: 1053 Anesthesia Stop: 1128    Procedure: MEDIPORT CATHETER INSERTION Diagnosis:       Poor venous access      (Poor venous access [I87.8])    Surgeons: Ulises Boo MD Responsible Provider: Erick Scruggs MD    Anesthesia Type: MAC ASA Status: 3            Anesthesia Type: MAC    Sharmila Phase I: Sharmila Score: 9    Sharmila Phase II:      Anesthesia Post Evaluation    Patient location during evaluation: PACU  Patient participation: complete - patient participated  Level of consciousness: awake  Pain score: 0  Airway patency: patent  Nausea & Vomiting: no nausea and no vomiting  Cardiovascular status: hemodynamically stable  Respiratory status: acceptable  Hydration status: euvolemic  Pain management: adequate        No notable events documented.

## 2024-01-09 NOTE — PROGRESS NOTES
towards the right. 3. Moderately displaced fracture of the posterior-lateral left 8th rib. The margins are indistinct, and this may be a pathologic fracture.     Vascular duplex lower extremity venous bilateral    Result Date: 12/28/2023  EXAMINATION: DUPLEX VENOUS ULTRASOUND OF THE BILATERAL LOWER ARYZRNUSPEK47/28/2023 9:08 pm TECHNIQUE: Duplex ultrasound using B-mode/gray scaled imaging and Doppler spectral analysis and color flow was obtained of the deep venous structures of the bilateral extremities. COMPARISON: None. HISTORY: ORDERING SYSTEM PROVIDED HISTORY: LEG SWELLING, PAIN, DVT SUSPECTED FINDINGS: The visualized veins of the bilateral lower extremities are patent and free of echogenic thrombus. The veins demonstrate good compressibility with normal color flow study and spectral analysis.     No evidence of DVT in either lower extremity.         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    1/1/2024  Awake and very interactive and main issue is dyspnea on minimal exertion  No fever or chills.  Continues on ceftriaxone and doxycycline.  Left-sided chest tube was inserted yesterday with over 3 L of output chest tube was put on gravity this morning.  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  Right benign-appearing renal cyst noted.  No evidence of visceral intra-abdominal metastasis.    PSA elevated with bone scan showing widespread skeletal metastasis with elevated alkaline phosphatase    Rule out metastatic prostate cancer  Rule out lung cancer with pleural effusion and bony metastasis    1/2/24  - Breathing and overall clinical picture slowly improving  - Diffusely metastatic process as above  - Pending cytology from pleural fluid  - PSA was 20.9  - Chest tube draining to gravity   - RSV  - Pending cytology, may require biopsy of bone. Once diagnosis is finalized, systemic therapy options will be addressed  - Will follow    1/3/24  Continues to improve with less dyspnea.  Cytology from pleural fluid still pending  He understands need for definite tissue diagnosis and is agreeable to CT-guided bone biopsy  Chest tube draining serosanguineous fluid to gravity  Lungs with decreased breath sounds at left base  Ultrasound of retroperitoneum shows a large benign-appearing 6 cm right kidney cysts and prostatic enlargement  For CT-guided biopsy of most accessible bony metastatic lesion to the discretion of IR apparently scheduled for later this afternoon    1/4/2023  Exudative pleural effusion with diffuse sclerotic lytic bone lesions.  S/p chest tube. Trapped lung. Pleurex planned  Elevated psa 20  Cytology from pleural fluid pending  Image guided bone lesion biopsy requested. Done today  Will follow    1/5/23  - Cytology + for adenocarcinoma , GI or pancreaticobiliary. Staining

## 2024-01-09 NOTE — PROGRESS NOTES
Internal Medicine Progress Note    [facility]    Jonnathan Pollock D.O., CICI Hutchins D.O., CICI Arellano D.O.    Joan Rai, MSN, APRN, NP-C  Chavez Harmon, MSN, APRN-CNP  Reid Oliveros, MSN, APRN, NP-C     Primary Care Physician: Todd Cross DO   Admitting Physician:  Jonnathan Pollock DO  Admission date and time: 12/28/2023  8:08 PM    Room:  [address] [place] [address]  Admitting diagnosis: Hyponatremia [E87.1]  Pleural effusion [J90]    Patient Name: Wilmer Fuentes  MRN: [id number]    Date of Service: 1/9/2024     Subjective:  Wilmer is a 78 y.o. male who was seen and examined today,1/9/2024, at the bedside. Wilmer is scheduled to undergo Mediport placement today and transitioning to Pleurx chest tube.  This will allow discharge home with home health care and drainage as necessary.    Review of System:   Constitutional:   Improving malaise and fatigue.  HEENT:   Denies ear pain, sore throat, sinus or eye problems.  Nasal cannula oxygen has been weaned off.  Cardiovascular:   Denies any chest pain, irregular heartbeats, or palpitations.   Respiratory:   Shortness of breath is stable.  There is some mild pain on inspiration related to the chest tube as to be expected.  Gastrointestinal:   Increased appetite with increased oral intake.  Genitourinary:    Denies any urgency, frequency, hematuria. Voiding without difficulty.  Extremities:   Denies lower extremity swelling, edema or cyanosis.   Neurology:    Denies any headache or focal neurological deficits, ongoing weakness and deconditioning.  Psch:   Denies being anxious or depressed.  Musculoskeletal:    Denies  myalgias, joint complaints or back pain.   Integumentary:   Denies any rashes, ulcers, or excoriations.  Denies bruising.  Hematologic/Lymphatic:  Denies bruising or bleeding.    Physical Exam:  No intake/output data recorded.    Intake/Output Summary (Last 24 hours) at  01/08/2024 06:45 AM    HGB 12.0 01/08/2024 06:45 AM    HCT 35.2 01/08/2024 06:45 AM     01/08/2024 06:45 AM    MCV 93.4 01/08/2024 06:45 AM    MCH 31.8 01/08/2024 06:45 AM    MCHC 34.1 01/08/2024 06:45 AM    RDW 12.7 01/08/2024 06:45 AM    SEGSPCT 39 05/07/2013 09:00 AM    LYMPHOPCT 18 01/08/2024 06:45 AM    MONOPCT 11 01/08/2024 06:45 AM    BASOPCT 0 01/08/2024 06:45 AM    MONOSABS 0.95 01/08/2024 06:45 AM    LYMPHSABS 1.52 01/08/2024 06:45 AM    EOSABS 0.16 01/08/2024 06:45 AM    BASOSABS 0.02 01/08/2024 06:45 AM     BMP:    Lab Results   Component Value Date/Time     01/08/2024 06:45 AM    K 3.5 01/08/2024 06:45 AM     01/08/2024 06:45 AM    CO2 28 01/08/2024 06:45 AM    BUN 12 01/08/2024 06:45 AM    LABALBU 3.2 12/30/2023 05:55 AM    LABALBU 4.1 10/06/2011 08:35 AM    CREATININE 0.8 01/08/2024 06:45 AM    CALCIUM 8.3 01/08/2024 06:45 AM    GFRAA >60 08/05/2021 08:42 AM    LABGLOM >60 01/08/2024 06:45 AM    GLUCOSE 99 01/08/2024 06:45 AM    GLUCOSE 88 10/06/2011 08:35 AM       Assessment:  Diffusely metastatic process resulting in malignant pleural effusion status post chest tube placement with preliminary pleural fluid cytology suggesting adenocarcinoma of unknown primary  Hyponatremia compatible with SIADH  Non-ST elevated myocardial infarction compatible with demand ischemia  Viral infection with RSV  Essential hypertension  Gastroesophageal reflux disease    Plan:   Mediport will be placed today with plan for transitioning to Pleurx catheter chest tube.  His respiratory status remains stable.  He requires increasing work with the therapy teams.  Discharge planning is underway as we await final pathology to determine plan of action moving forward for the metastatic malignancy of unknown origin.  We appreciate the input from the multiple subspecialist providing care.    Ozia requires this high level of physician care and nursing on the IMC unit due the complexity of decision management and

## 2024-01-09 NOTE — PROGRESS NOTES
Patient came down to IR specials to have left pleurx catheter placed.  Once patient was scanned with ultrasound by Dr. Turpin,  stated there was not enough fluid to be able to safely place the pleurx catheter today.  Dr. Turpin stated to continue to clamp the chest tube and will try again tomorrow.  Atrium was removed and a blue cap was placed at the 3 way stopcock.  There was a total of 1400 cc in atrium, will document in flowsheet. New tegaderm dressing placed over site.  New atrium sent back up with patient incase patient needs to be connected to atrium once again.        Nurse to nurse given to Azucena RN, nurse notified of above information and stated nurse needed to come down and help transport patient back to floor, patient still has chest tube placed.

## 2024-01-09 NOTE — CONSULTS
General Surgery consult    Patient's Name/Date of Birth: Wilmer Fuentes / 1945    Date: January 9, 2024     Surgeon: Ulises Boo M.D.    PCP: Todd Cross DO     Chief Complaint: poor venous access with cancer     HPI:   Wilmer Fuentes is a 78 y.o. male who presents for evaluation of cancer and poor venous access for port placement.       Past Medical History:   Diagnosis Date    Anxiety     Hyperlipidemia     Hypertension        Past Surgical History:   Procedure Laterality Date    ADRENALECTOMY      COLONOSCOPY      IR BIOPSY PERCUTANEOUS DEEP BONE  1/4/2024    IR BIOPSY PERCUTANEOUS DEEP BONE 1/4/2024 [facility] PROCEDURES    PROSTATE SURGERY      UPPER GASTROINTESTINAL ENDOSCOPY         Current Facility-Administered Medications   Medication Dose Route Frequency Provider Last Rate Last Admin    ipratropium 0.5 mg-albuterol 2.5 mg (DUONEB) nebulizer solution 1 Dose  1 Dose Inhalation Q4H PRN Lorie Roy, MERLENE - CNP        docusate sodium (COLACE) capsule 100 mg  100 mg Oral Daily Brown Bennett MD   100 mg at 01/09/24 0840    polyethylene glycol (GLYCOLAX) packet 17 g  17 g Oral Daily PRN Brown Bennett MD   17 g at 12/31/23 0758    magnesium sulfate 2000 mg in 50 mL IVPB premix  2,000 mg IntraVENous PRN Primitivo Hutchins DO        sodium phosphate 14.37 mmol in sodium chloride 0.9 % 250 mL IVPB  0.16 mmol/kg IntraVENous PRN Primitivo Hutchins DO        Or    sodium phosphate 28.77 mmol in sodium chloride 0.9 % 500 mL IVPB  0.32 mmol/kg IntraVENous PRN Primitivo Hutchins DO        potassium chloride (KLOR-CON M) extended release tablet 40 mEq  40 mEq Oral PRN Primitivo Hutchins,         Or    potassium bicarb-citric acid (EFFER-K) effervescent tablet 40 mEq  40 mEq Oral PRN Primitivo Hutchins,         Or    potassium chloride 10 mEq/100 mL IVPB (Peripheral Line)  10 mEq IntraVENous PRN Primitivo Hutchins,         urea (URE-NA) packet 15 g  15 g Oral Daily Ayan Santiago MD   15 g at 01/02/24 0823     perflutren lipid microspheres (DEFINITY) injection 1.5 mL  1.5 mL IntraVENous ONCE PRN Lorie Roy APRN - CNP        propranolol (INDERAL) tablet 20 mg  20 mg Oral TID Reid Oliveros APRN - CNP   20 mg at 01/09/24 0840    sodium chloride flush 0.9 % injection 5-40 mL  5-40 mL IntraVENous 2 times per day Reid Oliveros APRN - CNP   10 mL at 01/08/24 2016    sodium chloride flush 0.9 % injection 5-40 mL  5-40 mL IntraVENous PRN Reid Oliveros APRN - CNP        0.9 % sodium chloride infusion   IntraVENous PRN Reid Oliveros APRN - CNP        pantoprazole (PROTONIX) tablet 40 mg  40 mg Oral QAM AC Fabrizio Johnson MD   40 mg at 01/08/24 0540    enoxaparin (LOVENOX) injection 40 mg  40 mg SubCUTAneous Daily Fabrizio Johnson MD   40 mg at 01/08/24 0921       No Known Allergies    The patient has a family history that is negative for severe cardiovascular or respiratory issues, negative for reaction to anesthesia.    Social History     Socioeconomic History    Marital status:      Spouse name: Not on file    Number of children: Not on file    Years of education: Not on file    Highest education level: Not on file   Occupational History    Not on file   Tobacco Use    Smoking status: Former    Smokeless tobacco: Never   Vaping Use    Vaping Use: Never used   Substance and Sexual Activity    Alcohol use: Yes     Alcohol/week: 1.0 standard drink of alcohol     Types: 1 Glasses of wine per week     Comment: social    Drug use: No    Sexual activity: Never   Other Topics Concern    Not on file   Social History Narrative    Not on file     Social Determinants of Health     Financial Resource Strain: Not on file   Food Insecurity: No Food Insecurity (12/29/2023)    Hunger Vital Sign     Worried About Running Out of Food in the Last Year: Never true     Ran Out of Food in the Last Year: Never true   Transportation Needs: No Transportation Needs (12/29/2023)    PRAPARE - Transportation     Lack of Transportation (Medical): No

## 2024-01-09 NOTE — PROGRESS NOTES
Attempted tx with pt, however pt currently OOR for mediport  catheter insertion. Nsg states pt will be having a pleurex later.  Will attempt tx with pt at later time/date.PAOLO Hathaway/ALONSO #[id number]

## 2024-01-09 NOTE — OP NOTE
Wilmer Alfredo  1945      DATE OF PROCEDURE: 1/9/2024     SURGEON: Ulises Boo M.D.     ASSISTANT: kei     PREOPERATIVE DIAGNOSIS: poor venous access and cancer.     POSTOPERATIVE DIAGNOSIS: Same    OPERATION: Insertion of left subclavian central venous catheter with subcutaneous reservoir, fluoroscopy     ANESTHESIA: LMAC     ESTIMATED BLOOD LOSS: Minimal     COMPLICATIONS: None    DESCRIPTION OF PROCEDURE: The patient was identified and the procedure was confirmed. The left neck and chest were prepped and draped in the usual sterile fashion. Next, local anesthesic was used for local anesthesia. The left subclavian vein was percutaneously entered and a guidewire was advanced into the superior vena cava under fluoroscopic guidance. A small incision was made around the wire. A second skin incision was made below the clavicle and a pocket was made in the subcutaneous tissue for the reservoir.  The catheter was pulled through a subcutaneous tunnel from the inferior chest incision to the access incision. The introducer was passed over the wire then the catheter was passed through the introducer and the tip was positioned in the superior vena cava right atrial junction under fluoroscopic guidance. The catheter was connected to the reservoir and the locking hub was engaged.  The port was noted to withdrawal and flush blood easily and was flushed with heparinized saline solution. The reservoir was secured in the pocket with vicryl sutures.  Hemostasis was assured and the incisions were irrigated.  The incisions were approximated with Vicryl sutures and Dermabond was applied to the incisions in the operating room.  Needle, sponge, and instrument counts were reported as correct times two.  The patient tolerated the procedure and was transferred back to the floor in satisfactory condition.       Ulises Boo MD  11:19 AM  1/9/2024

## 2024-01-09 NOTE — CARE COORDINATION
1/9/24 1057 CM note: RSV (+) 12/29/23. Room air. L thoracentesis w/ 1000 cc off 12/29/23, L chest tube [id number]/31/23, and bone lesion bx done 1/4/24. Plan for mediport placement and L pleurx catheter insertion today. Discharge plan is home with [facility]. WILL NEED HHC ORDERS and WILL NEED ORDERS FOR FREQUENCY/AMOUNT TO DRAIN FROM PLEURX CATHETER. Pts family will provide transportation home. CM will follow. Electronically signed by Karol Davenport RN on 1/9/2024 at 11:00 AM

## 2024-01-09 NOTE — PROGRESS NOTES
ICU Intensivist Attestation:    I saw, examined, and discussed the patient with Blanca ANDRADE and agree with his assessment and plan.    The patient is comfortable but could only have his Mediport inserted today.  The Pleurx catheter cannot be placed because it was not enough fluid to allow the tube to enter.  Tomorrow he is scheduled to have a reassessment after imaging is obtained.    Otherwise, the patient is feeling well and is not short of breath and does not need any oxygen.  He will need chemotherapy after discharge.    Electronically signed by Fabrizio Johnson MD on 1/9/2024 at 3:42 PM  ICU Intensivist Attestation:    I saw, examined, and discussed the patient with   Blanca TAVARES and agree with her assessment and plan.    I have examined the patient and gone over his plan with CHAITANYA Russell.,  The patient is doing quite well and is awaiting his port insertion as well as his Pleurx catheter for recurrent malignant pleural effusion presumably from prostate cancer.    The patient is in good spirits and is not experiencing any shortness of breath.  The catheter should be placed tomorrow so that further treatment and plans for discharge can be contemplated.    Electronically signed by MERLENE Palacios CNP on 1/9/2024 at 1:30 PM    [facility]  Department of Internal Medicine  Division of Pulmonary, Critical Care and Sleep Medicine  Consult Note    Peter Cesar DO, MPH, FCCP, FACOI, FACP  Debra Slade DO, [facility]  MD Adolfo Dougherty MD David Weiner, MD Bridget Nance, APRN    PRIMARY PULMONOLOGIST: NONE    SUBJECTIVE: We are following Mr. Wilmer Fuentes for malignant pleural effusion. He is awake and alert on examination, remains on room air. Awaiting placement of Pleurx catheter and medi port which is planned for today. No new concerns, anxious for discharge.     OBJECTIVE:     PHYSICAL EXAM:   VITALS:   Vitals:        CREATININE 0.8 01/08/2024 06:45 AM    GFRAA >60 08/05/2021 08:42 AM    LABGLOM >60 01/08/2024 06:45 AM    GLUCOSE 99 01/08/2024 06:45 AM    GLUCOSE 88 10/06/2011 08:35 AM    PROT 6.2 12/30/2023 05:55 AM    LABALBU 3.2 12/30/2023 05:55 AM    LABALBU 4.1 10/06/2011 08:35 AM    CALCIUM 8.3 01/08/2024 06:45 AM    BILITOT 0.2 12/30/2023 05:55 AM    ALKPHOS 378 12/30/2023 05:55 AM    AST 17 12/30/2023 05:55 AM    ALT 12 12/30/2023 05:55 AM     Magnesium:    Lab Results   Component Value Date/Time    MG 2.0 01/08/2024 06:45 AM     Phosphorus:    Lab Results   Component Value Date/Time    PHOS 3.3 01/02/2024 08:03 AM     ABG:  No results found for: \"PH\", \"PCO2\", \"PO2\", \"HCO3\", \"BE\", \"THGB\", \"TCO2\", \"O2SAT\"     PRO-BNP:   Lab Results   Component Value Date    PROBNP 91 12/28/2023      ABGs: No results found for: \"PH\", \"PO2\", \"PCO2\"  Hemoglobin A1C: No components found for: \"HGBA1C\"    IMAGING:  Imaging tests were completed and reviewed and discussed radiology and care team involved and reveals   IR BIOPSY DEEP BONE    Result Date: 1/4/2024  PROCEDURE: CT GUIDED BONE MARROW ASPIRATION AND CORE NEEDLE BONE BIOPSY OF THE left ILIAC BONE. MODERATE CONSCIOUS SEDATION 1/4/2024 HISTORY: ORDERING SYSTEM PROVIDED HISTORY: Bone biopsy for tissue diagnosis of apparent cancerous/metasatic disease TECHNOLOGIST PROVIDED HISTORY: Reason for exam:->Bone biopsy for tissue diagnosis of apparent cancerous/metasatic disease SEDATION: Moderate sedation was ordered and supervised by the attending with physician face-to-face monitoring. Medications were provided and recorded by Radiology nurses.  1 mg of IV Versed and 50 mcg of IV fentanyl was administered. TECHNIQUE: Informed consent was obtained following a detailed explanation of the procedure including risks, benefits, and alternatives.  Universal protocol was followed. Sterile gowns, masks, hats and gloves utilized for maximal sterile barrier. Axial images were obtained through the iliac

## 2024-01-09 NOTE — PLAN OF CARE
Problem: Discharge Planning  Goal: Discharge to home or other facility with appropriate resources  Outcome: Progressing     Problem: Skin/Tissue Integrity  Goal: Absence of new skin breakdown  Description: 1.  Monitor for areas of redness and/or skin breakdown  2.  Assess vascular access sites hourly  3.  Every 4-6 hours minimum:  Change oxygen saturation probe site  4.  Every 4-6 hours:  If on nasal continuous positive airway pressure, respiratory therapy assess nares and determine need for appliance change or resting period.  Outcome: Progressing     Problem: Respiratory - Adult  Goal: Achieves optimal ventilation and oxygenation  Outcome: Progressing     Problem: Metabolic/Fluid and Electrolytes - Adult  Goal: Electrolytes maintained within normal limits  Outcome: Progressing     Problem: Pain  Goal: Verbalizes/displays adequate comfort level or baseline comfort level  Outcome: Progressing     Problem: Safety - Adult  Goal: Free from fall injury  Outcome: Progressing     Problem: Nutrition Deficit:  Goal: Optimize nutritional status  Outcome: Progressing

## 2024-01-10 ENCOUNTER — APPOINTMENT (OUTPATIENT)
Dept: GENERAL RADIOLOGY | Age: 79
DRG: 180 | End: 2024-01-10
Payer: MEDICARE

## 2024-01-10 ENCOUNTER — APPOINTMENT (OUTPATIENT)
Dept: INTERVENTIONAL RADIOLOGY/VASCULAR | Age: 79
DRG: 180 | End: 2024-01-10
Payer: MEDICARE

## 2024-01-10 PROCEDURE — 6370000000 HC RX 637 (ALT 250 FOR IP)

## 2024-01-10 PROCEDURE — C1769 GUIDE WIRE: HCPCS

## 2024-01-10 PROCEDURE — 2700000000 HC OXYGEN THERAPY PER DAY

## 2024-01-10 PROCEDURE — 97110 THERAPEUTIC EXERCISES: CPT | Performed by: PHYSICAL THERAPIST

## 2024-01-10 PROCEDURE — 99232 SBSQ HOSP IP/OBS MODERATE 35: CPT | Performed by: INTERNAL MEDICINE

## 2024-01-10 PROCEDURE — 71045 X-RAY EXAM CHEST 1 VIEW: CPT

## 2024-01-10 PROCEDURE — 97530 THERAPEUTIC ACTIVITIES: CPT | Performed by: PHYSICAL THERAPIST

## 2024-01-10 PROCEDURE — 75989 ABSCESS DRAINAGE UNDER X-RAY: CPT

## 2024-01-10 PROCEDURE — 2060000000 HC ICU INTERMEDIATE R&B

## 2024-01-10 PROCEDURE — 6370000000 HC RX 637 (ALT 250 FOR IP): Performed by: INTERNAL MEDICINE

## 2024-01-10 PROCEDURE — 32550 INSERT PLEURAL CATH: CPT

## 2024-01-10 RX ADMIN — PROPRANOLOL HYDROCHLORIDE 20 MG: 10 TABLET ORAL at 15:19

## 2024-01-10 RX ADMIN — DOCUSATE SODIUM 100 MG: 100 CAPSULE, LIQUID FILLED ORAL at 09:07

## 2024-01-10 RX ADMIN — PROPRANOLOL HYDROCHLORIDE 20 MG: 10 TABLET ORAL at 09:07

## 2024-01-10 RX ADMIN — PROPRANOLOL HYDROCHLORIDE 20 MG: 10 TABLET ORAL at 21:41

## 2024-01-10 ASSESSMENT — PAIN SCALES - GENERAL
PAINLEVEL_OUTOF10: 0

## 2024-01-10 NOTE — PROGRESS NOTES
ICU Intensivist Attestation:    I saw, examined, and discussed the patient with Blanca ANDRADE and agree with her assessment and plan.    The patient is doing quite well.  I examined him myself and listen to his chest after the Pleurx catheter was inserted.    The patient is in good spirits.  500 cc were drained today.  The patient is a candidate to be discharged tomorrow.  He will likely need catheter drainage either 2 or 3 times per week depending on how much fluid comes out.  Over time, the pleural space may emerge at the visceral and parietal surfaces and the drainage will stop.    We will suggest in place in the orders to schedule for home drainage Monday Wednesday and Friday.    For now, we will sign off since the patient is very stable from the respiratory standpoint.  Please not hesitate to call if any issues arise or any problems come up.    Electronically signed by Fabrizio Johnson MD on 1/10/2024 at 4:08 PM      [facility]  Department of Internal Medicine  Division of Pulmonary, Critical Care and Sleep Medicine  Consult Note    Peter Cesar DO, MPH, FCCP, FACOI, FACP  Debra Slade DO, FCCP  MD Adolfo Dougherty MD David Weiner, MD Bridget Nance, APRN    PRIMARY PULMONOLOGIST: NONE    SUBJECTIVE: We are following Mr. Wilmer Fuentes for malignant pleural effusion. He is awake and alert on examination, remains on room air. Wife present at the bedside. Wilmer remains on room air, left side mediport placed 1/9/2024 awaiting placement of pleurx catheter today. Denies any new concerns or complaints. Anxious for discharge.     OBJECTIVE:     PHYSICAL EXAM:   VITALS:   Vitals:    01/09/24 1939 01/09/24 2356 01/10/24 0523 01/10/24 0758   BP: 132/78 135/82 128/83 (!) 144/72   Pulse: 79 82 81 86   Resp: 18 19 16 22   Temp: 98.2 °F (36.8 °C) 97.6 °F (36.4 °C) 98 °F (36.7 °C) 97.7 °F (36.5 °C)   TempSrc: Oral Temporal Temporal Temporal   SpO2: 98% 98% 95%  97%   Weight:       Height:            Intake/Output Summary (Last 24 hours) at 1/10/2024 1147  Last data filed at 1/10/2024 0643  Gross per 24 hour   Intake 100 ml   Output 300 ml   Net -200 ml          CONSTITUTIONAL:   A&O x 3, NAD  SKIN:     No rash, No suspicious lesions, No skin discoloration  HEENT:     EOMI, MMM, No thrush  NECK:    No bruits, No JVP appreciated  CV:      Sinus,  No murmur, No rubs, No gallops  PULMONARY:   Diminished BS left hemithorax,  No Wheezing, No Rales, No Rhonchi, left side chest tube to suction      No noted egophony  ABDOMEN:     Soft, non-tender. BS normal. No R/R/G  EXT:    No deformities .  No clubbing.       No lower extremity edema, No venous stasis  PULSE:   Appears equal and palpable.  PSYCHIATRIC:  Seems appropriate, No acute psychosis  MS:    No fractures, No gross weakness  NEUROLOGIC:   The clinical assessment is non-focal     DATA: IMAGING & TESTING:     LABORATORY TESTS:    CBC with Differential:    Lab Results   Component Value Date/Time    WBC 8.6 01/08/2024 06:45 AM    RBC 3.77 01/08/2024 06:45 AM    HGB 12.0 01/08/2024 06:45 AM    HCT 35.2 01/08/2024 06:45 AM     01/08/2024 06:45 AM    MCV 93.4 01/08/2024 06:45 AM    MCH 31.8 01/08/2024 06:45 AM    MCHC 34.1 01/08/2024 06:45 AM    RDW 12.7 01/08/2024 06:45 AM    SEGSPCT 39 05/07/2013 09:00 AM    LYMPHOPCT 18 01/08/2024 06:45 AM    MONOPCT 11 01/08/2024 06:45 AM    BASOPCT 0 01/08/2024 06:45 AM    MONOSABS 0.95 01/08/2024 06:45 AM    LYMPHSABS 1.52 01/08/2024 06:45 AM    EOSABS 0.16 01/08/2024 06:45 AM    BASOSABS 0.02 01/08/2024 06:45 AM     CMP:    Lab Results   Component Value Date/Time     01/08/2024 06:45 AM    K 3.5 01/08/2024 06:45 AM     01/08/2024 06:45 AM    CO2 28 01/08/2024 06:45 AM    BUN 12 01/08/2024 06:45 AM    CREATININE 0.8 01/08/2024 06:45 AM    GFRAA >60 08/05/2021 08:42 AM    LABGLOM >60 01/08/2024 06:45 AM    GLUCOSE 99 01/08/2024 06:45 AM    GLUCOSE 88 10/06/2011 08:35 AM

## 2024-01-10 NOTE — PROGRESS NOTES
[facility]  Hematology/Oncology  Consult      Patient Name: Wilmer Fuentes  YOB: 1945  PCP: Todd Cross DO   Referring Provider:      Reason for Consultation:   Chief Complaint   Patient presents with    Shortness of Breath     Was at [facility] today and pcp called pt to go to ed, cough couple weeks, no n/v/d, no fever or chills        History of Present Illness: 78-year-old man with past medical history relevant for hypertension, hyperlipidemia and anxiety hospitalized with progressively worsening dyspnea and leg edema.  No fever or chest pain nausea vomiting or hemoptysis.  His BMP on admission showed hyponatremia.  Serum creatinine was normal.  Hepatic panel with hypoalbuminemia with elevated alk phos with normal transaminases and normal bilirubin.  CBC with a hemoglobin of 13 with normal platelet and neutrophilic leukocytosis related to Solumedrol .  PSA was elevated at 20.9.  CEA was 5.0 with minimally elevated  at 37  Chest x-ray and CTA of the chest showed a large left pleural effusion with partial collapse of left upper lobe.  There was groundglass opacities in the right lung apex suggestive of infectious inflammatory process.  Suspicious sclerotic foci at T10 and T11 vertebral bodies noted which may represent metastatic disease .  Bone scans show multiple focal areas of increased activity suspicious for metastatic disease most prominent in the right posterior calvarium and thoracolumbar spine as well as left posterior iliac wing and right hip intertrochanteric area.  CT abdomen, pelvis and CT head pending  Left-sided thoracentesis performed and fluid was exudative with markedly elevated total proteins and LDH highly consistent with malignant effusion and cytology pending.        Diagnostic Data:     Past Medical History:   Diagnosis Date    Anxiety     Hyperlipidemia     Hypertension        Patient Active Problem List    Diagnosis Date Noted    Poor venous access

## 2024-01-10 NOTE — PROGRESS NOTES
Left chest tube removed by Dr. Turpin. One suture to left mid back. Dressing of 4x4, petrolatum dressing and tegaderm applied. Electronically signed by Hayley Castañeda RN on 1/10/2024 at 2:20 PM

## 2024-01-10 NOTE — PROGRESS NOTES
while in a flat bed without using bedrails?: A Little  How much help is needed moving from lying on your back to sitting on the side of a flat bed without using bedrails?: A Little  How much help is needed moving to and from a bed to a chair?: A Little  How much help is needed standing up from a chair using your arms?: A Little  How much help is needed walking in hospital room?: A Little  How much help is needed climbing 3-5 steps with a railing?: A Little  AM-PAC Inpatient Mobility Raw Score : 18  AM-PAC Inpatient T-Scale Score : 43.63  Mobility Inpatient CMS 0-100% Score: 46.58  Mobility Inpatient CMS G-Code Modifier : CK    Nursing cleared patient for PT treatment.      OBJECTIVE:   Initial Evaluation  Date: 1/2/2024 Treatment Date:  1/10/2024     Short Term/ Long Term   Goals   Was pt agreeable to Eval/treatment? Yes Y To be met in 5 days   Pain level   5/10  Chest tube insertion 0/10    Bed Mobility  Using rails and head of bed elevated:     Rolling: Moderate assist of 1    Supine to sit: Moderate assist of 1    Sit to supine: Moderate assist of 1    Scooting: Moderate assist of 1   Using rails and head of bed elevated:     Rolling: Supervision    Supine to sit: Supervision    Sit to supine: Supervision    Scooting: Supervision     Rolling: Independent    Supine to sit: Independent    Sit to supine: Independent    Scooting: Independent     Transfers Sit to stand: Moderate assist of 1   Sit to stand: Supervision     Sit to stand: Independent     Ambulation     5 steps using  cane with Moderate assist of 1   for balance, upright, and O2 line management  and cues for upright posture and safety 50 feet using  cane with Supervision    for balance, upright, weight shift, and safety     50 feet using  least restrictive device with Supervision     Stair negotiation: ascended and descended   Not assessed  Not assessed      3 steps, 1 rail, cane, Minimal assist of 1      ROM Within functional limits        Strength BUE:    3+/5  RLE:  3/5  LLE:  3/5  Increase strength in affected mm groups by 1/3 grade   Balance Sitting EOB:  fair    Dynamic Standing:  poor    Sitting EOB: fair +  Dynamic Standing: fair with cane   Sitting EOB:  good    Dynamic Standing: fair       Patient is Alert & Oriented x person, place, time, and situation and follows directions    Sensation:  Patient  denies numbness/tingling   Edema:  yes bilateral lower extremities   Endurance: fair       Vitals: room air   Blood Pressure at rest  Blood Pressure during session    Heart Rate at rest   Heart Rate during session     SPO2 at rest %  SPO2 during session 88-92%     Patient education  Patient educated on role of Physical Therapy, risks of immobility, safety and plan of care, importance of positional changes for oxygen exchange,  importance of mobility while in hospital , ankle pumps, quad set and glut set for edema control, blood clot prevention, safety , O2 line management and safety , and positioning for skin integrity and comfort     Patient response to education:   Pt verbalized understanding Pt demonstrated skill Pt requires further education in this area   Yes Partial Yes      Treatment:  Patient practiced and was instructed/facilitated in the following treatment: Patient   Sat edge of bed 10 minutes with Supervision  to increase dynamic sitting balance and activity tolerance. Pt performed bed mobility, transfers, ambulation in room, seated exercises.        Therapeutic Exercises:  ankle pumps, heel raises, hip abduction/adduction, long arc quad, seated marching, and incentive spirometer   x 15-20 reps each.       At end of session, patient in chair with spouse present call light and phone within reach,  all lines and tubes intact, nursing notified.      Patient would benefit from continued skilled Physical Therapy to improve functional independence and quality of life.          Patient's/ family goals   home    Time in    1102  Time out  1125    Total

## 2024-01-10 NOTE — PROGRESS NOTES
Pt. Came to specials for left chest pleurx catheter placement.     Dr. Turpin explained procedure and answered any questions.  Patient was educated about the amount of radiation used with today's procedure.  Consent signed.     Patient positioned, secured to table.   Emotional support given.  All monitors and safety equipment secured. Prep begun     1359  procedure start /85  91  26  99% on room air      Left Pleurx catheter secured with non absorbable sutures.     two non absorbable suture placed at incision site.    1414  procedure completed /77  88  25  97% on room air     1414 draining complete     550 cc of dinora colored pleural fluid drained.  DSD applied to left back. CDI. Vss.       Pt. tolerated well.    Post chest xray taken     Nurse to nurse called to floor spoke with Kami BYRD, nurse notified of above information.  Nurse assumed post sedation vital signs     Patient transferred back to the 5th floor.

## 2024-01-10 NOTE — PROGRESS NOTES
Internal Medicine Progress Note    [facility]    Jonnathan Pollock D.O., CICI Hutchins D.O., CICI Arellano D.O.    Joan Rai, MSN, APRN, NP-C  Chavez Harmon, MSN, APRN-CNP  Reid Oliveros, MSN, APRN, NP-C     Primary Care Physician: Todd Cross DO   Admitting Physician:  Jonnathan Pollock DO  Admission date and time: 12/28/2023  8:08 PM    Room:  [address] [place] [address]  Admitting diagnosis: Hyponatremia [E87.1]  Pleural effusion [J90]    Patient Name: Wilmer Fuentes  MRN: [id number]    Date of Service: 1/10/2024     Subjective:  Wilmer is a 78 y.o. male who was seen and examined today,1/10/2024, at the bedside.  Continues to improve.  He is very anxious awaiting discharge.  Plan is to place Pleurx catheter hopefully today.  Arrangements were made for discharge home.  He is breathing easier without oxygen    Wife present at the bedside      Review of System:   Constitutional:   Improving malaise and fatigue.  HEENT:   Denies ear pain, sore throat, sinus or eye problems.  Nasal cannula oxygen has been weaned off.  Cardiovascular:   Denies any chest pain, irregular heartbeats, or palpitations.   Respiratory:   Shortness of breath is stable.  There is some mild pain on inspiration related to the chest tube as to be expected.  Gastrointestinal:   Increased appetite with increased oral intake.  Genitourinary:    Denies any urgency, frequency, hematuria. Voiding without difficulty.  Extremities:   Denies lower extremity swelling, edema or cyanosis.   Neurology:    Denies any headache or focal neurological deficits, ongoing weakness and deconditioning.  Psch:   Denies being anxious or depressed.  Musculoskeletal:    Denies  myalgias, joint complaints or back pain.   Integumentary:   Denies any rashes, ulcers, or excoriations.  Denies bruising.  Hematologic/Lymphatic:  Denies bruising or bleeding.    Physical Exam:  No intake/output data  recorded.    Intake/Output Summary (Last 24 hours) at 1/10/2024 1821  Last data filed at 1/10/2024 0643  Gross per 24 hour   Intake --   Output 300 ml   Net -300 ml   I/O last 3 completed shifts:  In: 200 [I.V.:200]  Out: 600 [Urine:600]  No data found.    Vital Signs:   Blood pressure 128/77, pulse 81, temperature 98.2 °F (36.8 °C), temperature source Oral, resp. rate 17, height 1.803 m (5' 10.98\"), weight 89.6 kg (197 lb 8 oz), SpO2 97 %.    General appearance:  Improving on a daily basis.  Awake and alert..  Head:  Normocephalic. No masses, lesions or tenderness.  Eyes:  PERRLA.  EOMI.  Sclera clear.  Buccal mucosa moist.  ENT:  Ears normal. Mucosa normal.  Nasal cannula oxygen has been weaned off.  Neck:    Supple. Trachea midline. No thyromegaly. No JVD. No bruits.  Heart:    Rhythm regular. Rate controlled.  Systolic murmur.  Lungs:    Chest tube in and currently clamped  Abdomen:   Soft. Non-tender. Non-distended. Bowel sounds positive. No organomegaly or masses.  No pain on palpation.  Extremities:    Peripheral pulses present.  No significant pitting peripheral edema.  No ulcers. No cyanosis. No clubbing.  Neurologic:    Alert x 3.  No focal deficit.  Cranial nerves grossly intact. No focal weakness.  Psych:   Behavior is normal. Mood appears normal. Speech is not rapid and/or pressured.  Musculoskeletal:   Spine ROM normal. Muscular strength intact. Gait not assessed.  Integumentary:  No rashes  Skin normal color and texture.  Genitalia/Breast:  Deferred    Medication:  Scheduled Meds:   docusate sodium  100 mg Oral Daily    urea  15 g Oral Daily    propranolol  20 mg Oral TID    pantoprazole  40 mg Oral QAM AC    enoxaparin  40 mg SubCUTAneous Daily     Continuous Infusions:        Objective Data:  CBC with Differential:    Lab Results   Component Value Date/Time    WBC 8.6 01/08/2024 06:45 AM    RBC 3.77 01/08/2024 06:45 AM    HGB 12.0 01/08/2024 06:45 AM    HCT 35.2 01/08/2024 06:45 AM

## 2024-01-10 NOTE — PLAN OF CARE
Problem: Skin/Tissue Integrity  Goal: Absence of new skin breakdown  Description: 1.  Monitor for areas of redness and/or skin breakdown  2.  Assess vascular access sites hourly  3.  Every 4-6 hours minimum:  Change oxygen saturation probe site  4.  Every 4-6 hours:  If on nasal continuous positive airway pressure, respiratory therapy assess nares and determine need for appliance change or resting period.  Outcome: Progressing     Problem: Respiratory - Adult  Goal: Achieves optimal ventilation and oxygenation  Outcome: Progressing     Problem: Pain  Goal: Verbalizes/displays adequate comfort level or baseline comfort level  Outcome: Progressing     Problem: Safety - Adult  Goal: Free from fall injury  Outcome: Progressing  Flowsheets (Taken 1/10/2024 1157 by Reta Waller RN)  Free From Fall Injury: Instruct family/caregiver on patient safety

## 2024-01-11 VITALS
TEMPERATURE: 98 F | BODY MASS INDEX: 25.87 KG/M2 | DIASTOLIC BLOOD PRESSURE: 82 MMHG | OXYGEN SATURATION: 95 % | HEART RATE: 88 BPM | SYSTOLIC BLOOD PRESSURE: 142 MMHG | RESPIRATION RATE: 24 BRPM | WEIGHT: 184.8 LBS | HEIGHT: 71 IN

## 2024-01-11 LAB — SURGICAL PATHOLOGY REPORT: NORMAL

## 2024-01-11 PROCEDURE — 6360000002 HC RX W HCPCS: Performed by: INTERNAL MEDICINE

## 2024-01-11 PROCEDURE — 6370000000 HC RX 637 (ALT 250 FOR IP): Performed by: INTERNAL MEDICINE

## 2024-01-11 PROCEDURE — 6370000000 HC RX 637 (ALT 250 FOR IP)

## 2024-01-11 RX ORDER — ASPIRIN 81 MG/1
81 TABLET ORAL DAILY
Qty: 90 TABLET | Refills: 1 | COMMUNITY
Start: 2024-01-11

## 2024-01-11 RX ORDER — PANTOPRAZOLE SODIUM 40 MG/1
40 TABLET, DELAYED RELEASE ORAL
Qty: 30 TABLET | Refills: 3 | Status: SHIPPED | OUTPATIENT
Start: 2024-01-12

## 2024-01-11 RX ADMIN — ENOXAPARIN SODIUM 40 MG: 100 INJECTION SUBCUTANEOUS at 08:39

## 2024-01-11 RX ADMIN — DOCUSATE SODIUM 100 MG: 100 CAPSULE, LIQUID FILLED ORAL at 08:40

## 2024-01-11 RX ADMIN — PROPRANOLOL HYDROCHLORIDE 20 MG: 10 TABLET ORAL at 08:40

## 2024-01-11 NOTE — PLAN OF CARE
Problem: Discharge Planning  Goal: Discharge to home or other facility with appropriate resources  Outcome: Completed     Problem: Skin/Tissue Integrity  Goal: Absence of new skin breakdown  Description: 1.  Monitor for areas of redness and/or skin breakdown  2.  Assess vascular access sites hourly  3.  Every 4-6 hours minimum:  Change oxygen saturation probe site  4.  Every 4-6 hours:  If on nasal continuous positive airway pressure, respiratory therapy assess nares and determine need for appliance change or resting period.  Outcome: Completed     Problem: Respiratory - Adult  Goal: Achieves optimal ventilation and oxygenation  Outcome: Completed     Problem: Metabolic/Fluid and Electrolytes - Adult  Goal: Electrolytes maintained within normal limits  Outcome: Completed     Problem: Pain  Goal: Verbalizes/displays adequate comfort level or baseline comfort level  Outcome: Completed     Problem: Safety - Adult  Goal: Free from fall injury  Outcome: Completed     Problem: Nutrition Deficit:  Goal: Optimize nutritional status  Outcome: Completed  Flowsheets (Taken 1/11/2024 1009 by Cele Partida RD)  Nutrient intake appropriate for improving, restoring, or maintaining nutritional needs: Recommend appropriate diets, oral nutritional supplements, and vitamin/mineral supplements

## 2024-01-11 NOTE — PROGRESS NOTES
CLINICAL PHARMACY NOTE: MEDS TO BEDS    Total # of Prescriptions Filled: 1   The following medications were delivered to the patient:  Pantoprazole 40 mg    Additional Documentation:

## 2024-01-11 NOTE — CARE COORDINATION
1/11/24 0931 CM note:  RSV (+) 12/29/23. Room air. L thoracentesis w/ 1000 ml off 12/29/23, L chest tube placed 12/31/23, and bone lesion bx done 1/4/24. Mediport placed 1/9/24. L chest tube removed 1/10/24 and replaced w/ L pleurx catheter and 550cc off. Discharge plan is home with [facility]. [facility] order noted. Per Janeth, [facility] liaison, will need to send patient home with 3 pleurx catheter kits and they will order pleurx supplies (it usually takes a week to obtain first supplies). Notified pts CARSON Madrigal. Notified Janeth that orders are on the chart for drainage of pluerx catheter at discharge. Updated patient on above. Pts family will provide transportation home. CM will follow. Electronically signed by Karol Davenport RN on 1/11/2024 at 9:49 AM

## 2024-01-11 NOTE — PROGRESS NOTES
Comprehensive Nutrition Assessment    Type and Reason for Visit:  Reassess    Nutrition Recommendations/Plan:   Recommend Gelatein 20 BID for remainder of admission (does not count towards fluids)  Continue current diet  Discussed and encouraged good nutrition for home; discuss if/when to implement ONS at home.      Malnutrition Assessment:  Malnutrition Status:  Insufficient data (JENNY d/t isolation status) (01/05/24 1128)    Context:  Chronic Illness     Findings of the 6 clinical characteristics of malnutrition:  Energy Intake:  Mild decrease in energy intake (Comment) (poor po intake ~3wks)  Weight Loss:  No significant weight loss     Body Fat Loss:  Unable to assess     Muscle Mass Loss:  Unable to assess    Fluid Accumulation:  Unable to assess     Strength:  Not Performed    Nutrition Assessment:    Pt notes good oral intake, self reported >75% of what he is ordering. Pt admit for hypoNA+, Left Pleural Effusion w/ mediastinal shift, NSTEMI, Rib fx, SIADH, prostate cancer w/ anu to brain/skull. PMHx: HTN, HLD, cancer. Pt with inadequate oral intake PTA. S/p  L thoracentesis 12/29/23, L chest tube placed 12/31/23 (removed 1/10/24; replaced w/ L Pleurx Catheter, and bone lesion bx done 1/4/24. Mediport placed 1/9/24. Discussed good nutrition for home at bedside if to discharge. Current fluid restriction, will add gelatein BID for remainder of admission.    Nutrition Related Findings:    A/O x 4; -12.4 L I/Os; +hand tremors; abd flat, soft, active BS, no edema; Pleurx Cath x 1; mediport noted. Wound Type: Surgical Incision (L Pleurx catheter)       Current Nutrition Intake & Therapies:    Average Meal Intake: % (self reported)  Average Supplements Intake: None Ordered  ADULT DIET; Regular; No Added Salt (3-4 gm); 1500 ml    Anthropometric Measures:  Height: 180.3 cm (5' 10.98\")  Ideal Body Weight (IBW): 172 lbs (78 kg)    Admission Body Weight: 89.6 kg (197 lb 8.5 oz)  Current Body Weight: 83.8 kg (184

## 2024-01-12 NOTE — DISCHARGE SUMMARY
[address] [address]                               DISCHARGE SUMMARY    PATIENT NAME: RANDY CLARKE                         :        1945  MED REC NO:   [id number]                            ROOM:       0517  ACCOUNT NO:   [id number]                           ADMIT DATE: 2023  PROVIDER:     Jonnathan Pollock DO                  DISCHARGE DATE:  2024    ADMITTING DIAGNOSIS:  Dyspnea.    FINAL DISCHARGE DIAGNOSES:  Diffusely metastatic process involving  malignant pleural effusion suggesting adenocarcinoma, site unknown, with  status post chest tube placement.    SECONDARY DISCHARGE DIAGNOSES:  1.  Electrolyte imbalance with hyponatremia, compatible with SIADH.  2.  Non-ST elevation myocardial infarction, compatible with demand  ischemia.  3.  Viral infection with RSV.  4.  Essential hypertension.  5.  Gastroesophageal reflux disease.    OPERATIONS PERFORMED:  Chest tube placement.  MediPort and PleurX  catheter.  Also, a bone biopsy.    CONSULTATIONS OBTAINED:  Dr. Boo, General Surgery; Dr. Lee,  Nephrology; Critical Care.  No OMT was given.    ADMITTING PROVIDERS:  Dr. Pollock and Dr. Hutchins.    CHIEF COMPLAINT AND HISTORY OF CHIEF COMPLAINT:  This is a pleasant  78-year-old male who was admitted to [facility].  The patient  presented to the hospital here on 2023 with increasing amount of  shortness of breath and difficulty breathing.  The patient noted at this  time that he did have a nonproductive cough.  Chest x-ray and CT did  show a large pleural effusion.  Evidence of hyponatremia was noted with  sodium being 120.  The patient was admitted to the hospital to the  intensive coronary care unit at this time.    PAST MEDICAL HISTORY:  Positive for history of usual childhood diseases,  history of anxiety, hyperlipidemia, and hypertension.    PHYSICAL EXAMINATION:  VITAL SIGNS:  Showed blood  PleurX catheter was placed.  Chest x-ray was improved.  The patient  was discharged home at this time by private car in improved and stable  condition.    Reviewing his vital signs at this time did show the following:  His  blood pressure was 142/82, temperature 98, pulse 88, respirations 24, O2  sat 95%, height 5 feet 10 inches, and weight 184 pounds.    The patient was discharged on aspirin 81 daily, Protonix 40 daily.  The  patient will be maintained on Crestor and Inderal 20 t.i.d.    The patient will follow up with Dr. Todd Cross, his primary care  doctor.  The patient will follow up with Oncology for institution of  treatment.  The patient did not require oxygen therapy.  The patient was  discharged home with drainage of the PleurX catheter as clinically  indicated.  On the day of discharge, the patient was improved.    More than 40 minutes was spent on the day of discharge with more than  50% of the time spent face-to-face with the patient discussing plan of  care and treatment at this time.        CECELIA KINGSTON DO    D: 01/11/2024 15:34:34       T: 01/11/2024 16:11:17     [email or web address]  Job#: [id number]     Doc#: [id number]    CC:

## 2024-01-14 LAB
MICROORGANISM SPEC CULT: NORMAL
MICROORGANISM/AGENT SPEC: NORMAL
SPECIMEN DESCRIPTION: NORMAL

## 2024-01-15 ENCOUNTER — TELEPHONE (OUTPATIENT)
Dept: CASE MANAGEMENT | Age: 79
End: 2024-01-15

## 2024-01-15 ENCOUNTER — TELEPHONE (OUTPATIENT)
Dept: INFUSION THERAPY | Age: 79
End: 2024-01-15

## 2024-01-15 ENCOUNTER — OFFICE VISIT (OUTPATIENT)
Dept: ONCOLOGY | Age: 79
End: 2024-01-15
Payer: MEDICARE

## 2024-01-15 ENCOUNTER — HOSPITAL ENCOUNTER (OUTPATIENT)
Dept: INFUSION THERAPY | Age: 79
Discharge: HOME | End: 2024-01-15
Payer: MEDICARE

## 2024-01-15 VITALS
OXYGEN SATURATION: 97 % | SYSTOLIC BLOOD PRESSURE: 134 MMHG | BODY MASS INDEX: 26.88 KG/M2 | HEIGHT: 71 IN | DIASTOLIC BLOOD PRESSURE: 79 MMHG | HEART RATE: 79 BPM | WEIGHT: 192 LBS | TEMPERATURE: 97.1 F

## 2024-01-15 DIAGNOSIS — C78.02 MALIGNANT NEOPLASM METASTATIC TO LEFT LUNG (HCC): Primary | ICD-10-CM

## 2024-01-15 PROCEDURE — 99214 OFFICE O/P EST MOD 30 MIN: CPT

## 2024-01-15 PROCEDURE — 36415 COLL VENOUS BLD VENIPUNCTURE: CPT

## 2024-01-15 RX ORDER — IBUPROFEN 200 MG
400 TABLET ORAL EVERY 6 HOURS PRN
COMMUNITY

## 2024-01-15 RX ORDER — SODIUM CHLORIDE 0.9 % (FLUSH) 0.9 %
5-40 SYRINGE (ML) INJECTION PRN
OUTPATIENT
Start: 2024-01-15

## 2024-01-15 RX ORDER — SODIUM CHLORIDE 9 MG/ML
25 INJECTION, SOLUTION INTRAVENOUS PRN
OUTPATIENT
Start: 2024-01-15

## 2024-01-15 RX ORDER — HEPARIN 100 UNIT/ML
500 SYRINGE INTRAVENOUS PRN
OUTPATIENT
Start: 2024-01-15

## 2024-01-15 NOTE — TELEPHONE ENCOUNTER
Met with patient, his wife and daughter during the initial consult with Dr. Davis for metastatic adenocarcinoma of unknown primary. Introduced nurse navigator role, gave contact information and informed of other supportive services in clinic: , financial navigator and nutrition. Patient denied issues with living arrangements, transportation, insurance and prescription coverage. Discussed FMLA options with his daughter. He has a Pleurx catheter which is being managed per [facility]. Left a voice message with their office regarding documentation for drainage amount, requested per Dr. Davis. Patient reported current appetite as poor over the past 2 weeks, denying unplanned weight loss. Informed the clinic dietitian will be consulted for additional support, patient was agreeable. He was informed a PET scan has been ordered. Provided the [employer] scheduling number to call to make an appointments and asked to contact this nurse navigator once he has one. Reviewed and provided patient with PET scan prep instructions, thoroughly reviewed all dietary instructions to follow 24 hours prior to the scan. Informed patient he'll need dental clearance for Xgeva injections, provided with a dental clearance form to be faxed back to clinic once he's evaluated. Informed a referral will be made to palliative care for symptom management and that they'll contact him with an appointment. Patient will return to see Dr. Davis on 01/29/2024 to discuss his plan of care. Prior to patient leaving clinic Guardant 360 liquid biopsy reflex to tissue was drawn, patient provided the patient education pamphlet and an order faxed to [facility] for Cancer Type ID testing, confirmation received. Patient and his family denied further needs today, encouraged to call should any arise, they verbalized understanding. Patient's daughter contacted clinic, PET scan is scheduled for 01/22/2024 at [facility].Lisa Pérez RN,

## 2024-01-15 NOTE — TELEPHONE ENCOUNTER
Wilmer Fuentes  1/15/2024  Ht Readings from Last 1 Encounters:   01/15/24 1.803 m (5' 11\")     Wt Readings from Last 10 Encounters:   01/15/24 87.1 kg (192 lb)   01/11/24 83.8 kg (184 lb 12.8 oz)   06/22/23 88.9 kg (196 lb)   04/12/22 88.5 kg (195 lb)   05/24/19 88.5 kg (195 lb)     BMI=26.78    Assessment: Met with Wilmer, his wife and daughter while in for new patient OV with Dr. Davis for metastatic cancer. He was hospitalized 12/28/23-1/11/24. During hospitalization he was on 1500ml fluid restriction but no longer following FR at home. He has a Pleurex. Current weight 192#. Decreased appetite reported, patient complains of early satiety and constipation. Discussed importance of adequate energy/protein intake to maintain lean muscle mass to maintain activity and reduce risk of falls. Recommended eating 6 small meals per day due to early satiety. Provided resource of \"High Calorie/Protein Ideas\". Also made suggestions to improve bowel regularity, and provided resource of \"Constipation Remedies\".  Had previously tried Boost Plus and didn't mind. Provided samples of Ensure Complete/coupons. Explained my role at cancer center. Nutrition questions answered to apparent satisfaction. Provided \"Nutrition Therapy for Cancer Related Side Effects\" and contact information. Encouraged them to call with questions or concerns.     Weight change: 3.9% significant weight gain x 4 days (different scales, clothing. He denied increased SOB), 3.95% weight loss compared to 12/29/23. 2.04% weight loss x 6 months  Appetite: Decreased appetite, eating less/smaller portions.   Nutritional Side Effects: constipation, early satiety  Calculated Needs: 28-30 kcal/kg CBW = 3368-0177 kcal, 1.3-1.5 gm/kg/IBW = 100-120 gm pro, 20-25 = 9561-2453 ml fluids  Malnutrition Status: High risk    Recommendations: Eat 6 small/protein containing meals per day. Utilize oral nutrition supplement of choice (such as Ensure, Boost, [place] Breakfast Essentials

## 2024-01-15 NOTE — PROGRESS NOTES
Wilmer Alfredo  1945 78 y.o.      Referring Physician:     PCP: Todd Cross, DO    Vitals:    01/15/24 1024   BP: 134/79   Pulse: 79   Temp: 97.1 °F (36.2 °C)   SpO2: 97%        Wt Readings from Last 3 Encounters:   01/15/24 87.1 kg (192 lb)   24 83.8 kg (184 lb 12.8 oz)   23 88.9 kg (196 lb)        Body mass index is 26.78 kg/m².          Chief Complaint:   Chief Complaint   Patient presents with    New Patient     Lung cancer          Cancer Staging   No matching staging information was found for the patient.    Prior Radiation Therapy? NO    Concurrent Chemo/radiation? NO    Prior Chemotherapy? NO    Prior Hormonal Therapy? NO    Head and Neck Cancer? No, patient does NOT have HN cancer.      LMP: na    Age at first Menses: na    : na    Para: na          Current Outpatient Medications:     ibuprofen (ADVIL;MOTRIN) 200 MG tablet, Take 2 tablets by mouth every 6 hours as needed for Pain, Disp: , Rfl:     pantoprazole (PROTONIX) 40 MG tablet, Take 1 tablet by mouth every morning (before breakfast), Disp: 30 tablet, Rfl: 3    aspirin 81 MG EC tablet, Take 1 tablet by mouth daily, Disp: 90 tablet, Rfl: 1    rosuvastatin (CRESTOR) 20 MG tablet, Take 1 tablet by mouth daily, Disp: , Rfl:     propranolol (INDERAL) 20 MG tablet, TAKE 1 TABLET BY MOUTH 3 TIMES A DAY, Disp: , Rfl: 3       Past Medical History:   Diagnosis Date    Anxiety     Cancer (HCC)     Constipation     Hearing loss     Hyperlipidemia     Hypertension     Urinary incontinence        Past Surgical History:   Procedure Laterality Date    ADRENALECTOMY      CATHETER INSERTION N/A 2024    MEDIPORT CATHETER INSERTION performed by Ulises Boo MD at [facility] OR    COLONOSCOPY      IR BIOPSY PERCUTANEOUS DEEP BONE  2024    IR BIOPSY PERCUTANEOUS DEEP BONE 2024 [facility] SPECIAL PROCEDURES    IR INSERT TUNNELED PLEURA CATH W CUFF  01/10/2024    IR INSERT TUNNELED PLEURA CATH W CUFF 1/10/2024 [facility] SPECIAL PROCEDURES

## 2024-01-15 NOTE — PROGRESS NOTES
[facility]  Hematology/Oncology  Consult      Patient Name: Wilmer Fuentes  YOB: 1945  PCP: Todd Cross DO   Referring Provider:      Reason for Consultation:   No chief complaint on file.       History of Present Illness: 78-year-old man with past medical history relevant for hypertension, hyperlipidemia and anxiety hospitalized with progressively worsening dyspnea and leg edema.  No fever or chest pain nausea vomiting or hemoptysis.  His BMP on admission showed hyponatremia.  Serum creatinine was normal.  Hepatic panel with hypoalbuminemia with elevated alk phos with normal transaminases and normal bilirubin.  CBC with a hemoglobin of 13 with normal platelet and neutrophilic leukocytosis related to Solumedrol .  PSA was elevated at 20.9.  CEA was 5.0 with minimally elevated  at 37  Chest x-ray and CTA of the chest showed a large left pleural effusion with partial collapse of left upper lobe.  There was groundglass opacities in the right lung apex suggestive of infectious inflammatory process.  Suspicious sclerotic foci at T10 and T11 vertebral bodies noted which may represent metastatic disease .  Bone scans show multiple focal areas of increased activity suspicious for metastatic disease most prominent in the right posterior calvarium and thoracolumbar spine as well as left posterior iliac wing and right hip intertrochanteric area.  CT abdomen, pelvis and CT head pending  Left-sided thoracentesis performed and fluid was exudative with markedly elevated total proteins and LDH highly consistent with malignant effusion and cytology pending.        Diagnostic Data:     Past Medical History:   Diagnosis Date    Anxiety     Hyperlipidemia     Hypertension        Patient Active Problem List    Diagnosis Date Noted    Poor venous access 01/09/2024    Pleural effusion 12/31/2023    Metastatic cancer (HCC) 12/30/2023    Hyponatremia 12/29/2023        Past Surgical History:   Procedure

## 2024-01-15 NOTE — PROGRESS NOTES
Patient presents to clinic for labs and office visit today.  Left chest  SQ port accessed per policy using 20G .75in  Wyman needle for NO blood return.   Site flushed easily with 10 mL NSS followed by 5 mL Heparin solution 100 units/ml rinse prior to de-access.  Dry sterile dressing to area.  Tolerated procedure well.   Labs drawn peripherally. Specimen sent to [place] [id number].

## 2024-01-16 LAB
MICROORGANISM SPEC CULT: NORMAL
MICROORGANISM/AGENT SPEC: NORMAL
SPECIMEN DESCRIPTION: NORMAL

## 2024-01-17 ENCOUNTER — CLINICAL DOCUMENTATION (OUTPATIENT)
Dept: INFUSION THERAPY | Age: 79
End: 2024-01-17

## 2024-01-19 ENCOUNTER — TELEPHONE (OUTPATIENT)
Dept: PALLATIVE CARE | Age: 79
End: 2024-01-19

## 2024-01-19 NOTE — TELEPHONE ENCOUNTER
Placed call to pt regarding referral to Palliative care from Dr. Davis office, unable to speak with pt, no answer. Spoke with pt daughter Anat, scheduled pt to be seen as soon as possible in clinic due to ongoing pain issues. Pt scheduled to be seen on 1[facility]31 at 9am at [facility].       Vannessa CRUZ,[facility]  Palliative Medicine

## 2024-01-21 LAB
MICROORGANISM SPEC CULT: NORMAL
MICROORGANISM/AGENT SPEC: NORMAL
SPECIMEN DESCRIPTION: NORMAL

## 2024-01-22 ENCOUNTER — HOSPITAL ENCOUNTER (OUTPATIENT)
Dept: NUCLEAR MEDICINE | Age: 79
Discharge: HOME | End: 2024-01-22
Payer: MEDICARE

## 2024-01-22 DIAGNOSIS — C78.02 MALIGNANT NEOPLASM METASTATIC TO LEFT LUNG (HCC): ICD-10-CM

## 2024-01-22 PROCEDURE — 78815 PET IMAGE W/CT SKULL-THIGH: CPT

## 2024-01-22 RX ORDER — FLUDEOXYGLUCOSE F 18 200 MCI/ML
15 INJECTION, SOLUTION INTRAVENOUS
Status: DISCONTINUED | OUTPATIENT
Start: 2024-01-22 | End: 2024-01-23 | Stop reason: HOSPADM

## 2024-01-23 ENCOUNTER — TELEPHONE (OUTPATIENT)
Dept: ONCOLOGY | Age: 79
End: 2024-01-23

## 2024-01-23 LAB
MICROORGANISM SPEC CULT: NORMAL
MICROORGANISM/AGENT SPEC: NORMAL
SPECIMEN DESCRIPTION: NORMAL

## 2024-01-23 NOTE — TELEPHONE ENCOUNTER
SW contacted pt to review positive distress screen.  SW spoke with pt's wife.  Mrs. Fuentes stating that pt \"seems to be holding his own,\" however stated that he was more tired and sleeping often and did not have much of an appetite at this time.  Mrs. Fuentes stated that she was trying to have him eat smaller meals throughout the day and that pt still has home health nurse coming three times per week.  Mrs. Fuentes denied any other needs at this time.  SW to follow up with pt and wife at upcoming appointment on 1/29/24.        Elver Joseph MSW, THOMAS-S  Oncology Social Worker

## 2024-01-25 LAB — SURGICAL PATHOLOGY REPORT: NORMAL

## 2024-01-28 LAB
MICROORGANISM SPEC CULT: NORMAL
MICROORGANISM/AGENT SPEC: NORMAL
SPECIMEN DESCRIPTION: NORMAL

## 2024-01-29 ENCOUNTER — TELEPHONE (OUTPATIENT)
Dept: ONCOLOGY | Age: 79
End: 2024-01-29

## 2024-01-29 ENCOUNTER — TELEPHONE (OUTPATIENT)
Dept: INFUSION THERAPY | Age: 79
End: 2024-01-29

## 2024-01-29 ENCOUNTER — HOSPITAL ENCOUNTER (OUTPATIENT)
Dept: INFUSION THERAPY | Age: 79
Discharge: HOME | End: 2024-01-29
Payer: MEDICARE

## 2024-01-29 ENCOUNTER — OFFICE VISIT (OUTPATIENT)
Dept: ONCOLOGY | Age: 79
End: 2024-01-29
Payer: MEDICARE

## 2024-01-29 VITALS
TEMPERATURE: 97.1 F | HEIGHT: 71 IN | WEIGHT: 184.1 LBS | SYSTOLIC BLOOD PRESSURE: 161 MMHG | OXYGEN SATURATION: 97 % | BODY MASS INDEX: 25.77 KG/M2 | HEART RATE: 86 BPM | DIASTOLIC BLOOD PRESSURE: 94 MMHG

## 2024-01-29 DIAGNOSIS — C79.51 SECONDARY MALIGNANCY OF BONE OF LOWER EXTREMITY (HCC): ICD-10-CM

## 2024-01-29 DIAGNOSIS — C79.9 METASTATIC MALIGNANT NEOPLASM, UNSPECIFIED SITE (HCC): Primary | ICD-10-CM

## 2024-01-29 DIAGNOSIS — C79.51 CHOLANGIOCARCINOMA METASTATIC TO BONE (HCC): ICD-10-CM

## 2024-01-29 DIAGNOSIS — C22.1 CHOLANGIOCARCINOMA METASTATIC TO BONE (HCC): Primary | ICD-10-CM

## 2024-01-29 DIAGNOSIS — C78.02 MALIGNANT NEOPLASM METASTATIC TO LEFT LUNG (HCC): ICD-10-CM

## 2024-01-29 DIAGNOSIS — C78.02 MALIGNANT NEOPLASM METASTATIC TO LEFT LUNG (HCC): Primary | ICD-10-CM

## 2024-01-29 DIAGNOSIS — C79.51 CHOLANGIOCARCINOMA METASTATIC TO BONE (HCC): Primary | ICD-10-CM

## 2024-01-29 DIAGNOSIS — C22.1 CHOLANGIOCARCINOMA METASTATIC TO BONE (HCC): ICD-10-CM

## 2024-01-29 LAB
ALBUMIN SERPL-MCNC: 3.1 G/DL (ref 3.5–5.2)
ALP SERPL-CCNC: 673 U/L (ref 40–129)
ALT SERPL-CCNC: 32 U/L (ref 0–40)
ANION GAP SERPL CALCULATED.3IONS-SCNC: 8 MMOL/L (ref 7–16)
AST SERPL-CCNC: 37 U/L (ref 0–39)
BASOPHILS # BLD: 0.03 K/UL (ref 0–0.2)
BASOPHILS NFR BLD: 0 % (ref 0–2)
BILIRUB SERPL-MCNC: 0.5 MG/DL (ref 0–1.2)
BUN SERPL-MCNC: 9 MG/DL (ref 6–23)
CALCIUM SERPL-MCNC: 8.8 MG/DL (ref 8.6–10.2)
CHLORIDE SERPL-SCNC: 96 MMOL/L (ref 98–107)
CO2 SERPL-SCNC: 29 MMOL/L (ref 22–29)
CREAT SERPL-MCNC: 0.8 MG/DL (ref 0.7–1.2)
EOSINOPHIL # BLD: 0.07 K/UL (ref 0.05–0.5)
EOSINOPHILS RELATIVE PERCENT: 1 % (ref 0–6)
ERYTHROCYTE [DISTWIDTH] IN BLOOD BY AUTOMATED COUNT: 13.5 % (ref 11.5–15)
GFR SERPL CREATININE-BSD FRML MDRD: >60 ML/MIN/1.73M2
GLUCOSE SERPL-MCNC: 113 MG/DL (ref 74–99)
HCT VFR BLD AUTO: 33.4 % (ref 37–54)
HGB BLD-MCNC: 11.2 G/DL (ref 12.5–16.5)
IMM GRANULOCYTES # BLD AUTO: 0.12 K/UL (ref 0–0.58)
IMM GRANULOCYTES NFR BLD: 1 % (ref 0–5)
LYMPHOCYTES NFR BLD: 2.02 K/UL (ref 1.5–4)
LYMPHOCYTES RELATIVE PERCENT: 16 % (ref 20–42)
MCH RBC QN AUTO: 30.8 PG (ref 26–35)
MCHC RBC AUTO-ENTMCNC: 33.5 G/DL (ref 32–34.5)
MCV RBC AUTO: 91.8 FL (ref 80–99.9)
MONOCYTES NFR BLD: 1.23 K/UL (ref 0.1–0.95)
MONOCYTES NFR BLD: 10 % (ref 2–12)
NEUTROPHILS NFR BLD: 72 % (ref 43–80)
NEUTS SEG NFR BLD: 8.87 K/UL (ref 1.8–7.3)
PLATELET # BLD AUTO: 226 K/UL (ref 130–450)
PMV BLD AUTO: 9.1 FL (ref 7–12)
POTASSIUM SERPL-SCNC: 4 MMOL/L (ref 3.5–5)
PROT SERPL-MCNC: 6.1 G/DL (ref 6.4–8.3)
RBC # BLD AUTO: 3.64 M/UL (ref 3.8–5.8)
SODIUM SERPL-SCNC: 133 MMOL/L (ref 132–146)
WBC OTHER # BLD: 12.3 K/UL (ref 4.5–11.5)

## 2024-01-29 PROCEDURE — 36415 COLL VENOUS BLD VENIPUNCTURE: CPT

## 2024-01-29 PROCEDURE — 36593 DECLOT VASCULAR DEVICE: CPT

## 2024-01-29 PROCEDURE — 80053 COMPREHEN METABOLIC PANEL: CPT

## 2024-01-29 PROCEDURE — 83735 ASSAY OF MAGNESIUM: CPT

## 2024-01-29 PROCEDURE — 99213 OFFICE O/P EST LOW 20 MIN: CPT

## 2024-01-29 PROCEDURE — 6360000002 HC RX W HCPCS: Performed by: INTERNAL MEDICINE

## 2024-01-29 PROCEDURE — 85025 COMPLETE CBC W/AUTO DIFF WBC: CPT

## 2024-01-29 PROCEDURE — 2580000003 HC RX 258: Performed by: INTERNAL MEDICINE

## 2024-01-29 RX ORDER — SODIUM CHLORIDE 9 MG/ML
5-250 INJECTION, SOLUTION INTRAVENOUS PRN
Status: CANCELLED | OUTPATIENT
Start: 2024-01-31

## 2024-01-29 RX ORDER — ALBUTEROL SULFATE 90 UG/1
4 AEROSOL, METERED RESPIRATORY (INHALATION) PRN
OUTPATIENT
Start: 2024-02-07

## 2024-01-29 RX ORDER — SODIUM CHLORIDE 0.9 % (FLUSH) 0.9 %
5-40 SYRINGE (ML) INJECTION PRN
Status: CANCELLED | OUTPATIENT
Start: 2024-01-31

## 2024-01-29 RX ORDER — ALBUTEROL SULFATE 90 UG/1
4 AEROSOL, METERED RESPIRATORY (INHALATION) PRN
Status: CANCELLED | OUTPATIENT
Start: 2024-01-31

## 2024-01-29 RX ORDER — SODIUM CHLORIDE 9 MG/ML
5-250 INJECTION, SOLUTION INTRAVENOUS PRN
OUTPATIENT
Start: 2024-02-07

## 2024-01-29 RX ORDER — ACETAMINOPHEN 325 MG/1
650 TABLET ORAL
Status: CANCELLED | OUTPATIENT
Start: 2024-01-31

## 2024-01-29 RX ORDER — MEPERIDINE HYDROCHLORIDE 25 MG/ML
12.5 INJECTION INTRAMUSCULAR; INTRAVENOUS; SUBCUTANEOUS PRN
Status: CANCELLED | OUTPATIENT
Start: 2024-01-31

## 2024-01-29 RX ORDER — EPINEPHRINE 1 MG/ML
0.3 INJECTION, SOLUTION, CONCENTRATE INTRAVENOUS PRN
OUTPATIENT
Start: 2024-02-07

## 2024-01-29 RX ORDER — SODIUM CHLORIDE 9 MG/ML
25 INJECTION, SOLUTION INTRAVENOUS PRN
OUTPATIENT
Start: 2024-01-29

## 2024-01-29 RX ORDER — DIPHENHYDRAMINE HYDROCHLORIDE 50 MG/ML
50 INJECTION INTRAMUSCULAR; INTRAVENOUS
OUTPATIENT
Start: 2024-02-07

## 2024-01-29 RX ORDER — ONDANSETRON 2 MG/ML
8 INJECTION INTRAMUSCULAR; INTRAVENOUS
OUTPATIENT
Start: 2024-02-07

## 2024-01-29 RX ORDER — HEPARIN 100 UNIT/ML
500 SYRINGE INTRAVENOUS PRN
Status: DISCONTINUED | OUTPATIENT
Start: 2024-01-29 | End: 2024-01-30 | Stop reason: HOSPADM

## 2024-01-29 RX ORDER — HEPARIN 100 UNIT/ML
500 SYRINGE INTRAVENOUS PRN
OUTPATIENT
Start: 2024-02-07

## 2024-01-29 RX ORDER — ACETAMINOPHEN 325 MG/1
650 TABLET ORAL
OUTPATIENT
Start: 2024-02-07

## 2024-01-29 RX ORDER — SODIUM CHLORIDE 9 MG/ML
INJECTION, SOLUTION INTRAVENOUS CONTINUOUS
OUTPATIENT
Start: 2024-02-07

## 2024-01-29 RX ORDER — DEXAMETHASONE SODIUM PHOSPHATE 10 MG/ML
10 INJECTION, SOLUTION INTRAMUSCULAR; INTRAVENOUS ONCE
Status: CANCELLED | OUTPATIENT
Start: 2024-01-31 | End: 2024-01-31

## 2024-01-29 RX ORDER — ONDANSETRON 2 MG/ML
8 INJECTION INTRAMUSCULAR; INTRAVENOUS
Status: CANCELLED | OUTPATIENT
Start: 2024-01-31

## 2024-01-29 RX ORDER — EPINEPHRINE 1 MG/ML
0.3 INJECTION, SOLUTION, CONCENTRATE INTRAVENOUS PRN
Status: CANCELLED | OUTPATIENT
Start: 2024-01-31

## 2024-01-29 RX ORDER — SODIUM CHLORIDE 9 MG/ML
INJECTION, SOLUTION INTRAVENOUS CONTINUOUS
Status: CANCELLED | OUTPATIENT
Start: 2024-01-31

## 2024-01-29 RX ORDER — SODIUM CHLORIDE 0.9 % (FLUSH) 0.9 %
5-40 SYRINGE (ML) INJECTION PRN
Status: DISCONTINUED | OUTPATIENT
Start: 2024-01-29 | End: 2024-01-30 | Stop reason: HOSPADM

## 2024-01-29 RX ORDER — HEPARIN 100 UNIT/ML
500 SYRINGE INTRAVENOUS PRN
OUTPATIENT
Start: 2024-01-29

## 2024-01-29 RX ORDER — DIPHENHYDRAMINE HYDROCHLORIDE 50 MG/ML
50 INJECTION INTRAMUSCULAR; INTRAVENOUS
Status: CANCELLED | OUTPATIENT
Start: 2024-01-31

## 2024-01-29 RX ORDER — MEPERIDINE HYDROCHLORIDE 25 MG/ML
12.5 INJECTION INTRAMUSCULAR; INTRAVENOUS; SUBCUTANEOUS PRN
OUTPATIENT
Start: 2024-02-07

## 2024-01-29 RX ORDER — SODIUM CHLORIDE 0.9 % (FLUSH) 0.9 %
5-40 SYRINGE (ML) INJECTION PRN
OUTPATIENT
Start: 2024-02-07

## 2024-01-29 RX ORDER — HEPARIN 100 UNIT/ML
500 SYRINGE INTRAVENOUS PRN
Status: CANCELLED | OUTPATIENT
Start: 2024-01-31

## 2024-01-29 RX ORDER — SODIUM CHLORIDE 0.9 % (FLUSH) 0.9 %
5-40 SYRINGE (ML) INJECTION PRN
OUTPATIENT
Start: 2024-01-29

## 2024-01-29 RX ORDER — PALONOSETRON HYDROCHLORIDE 0.05 MG/ML
0.25 INJECTION, SOLUTION INTRAVENOUS ONCE
Status: CANCELLED | OUTPATIENT
Start: 2024-01-31 | End: 2024-01-31

## 2024-01-29 RX ORDER — PALONOSETRON HYDROCHLORIDE 0.05 MG/ML
0.25 INJECTION, SOLUTION INTRAVENOUS ONCE
OUTPATIENT
Start: 2024-02-07 | End: 2024-02-07

## 2024-01-29 RX ORDER — DEXAMETHASONE SODIUM PHOSPHATE 10 MG/ML
10 INJECTION, SOLUTION INTRAMUSCULAR; INTRAVENOUS ONCE
OUTPATIENT
Start: 2024-02-07 | End: 2024-02-07

## 2024-01-29 RX ADMIN — Medication 500 UNITS: at 11:26

## 2024-01-29 RX ADMIN — ALTEPLASE 2 MG: 2.2 INJECTION, POWDER, LYOPHILIZED, FOR SOLUTION INTRAVENOUS at 10:24

## 2024-01-29 RX ADMIN — SODIUM CHLORIDE, PRESERVATIVE FREE 10 ML: 5 INJECTION INTRAVENOUS at 11:26

## 2024-01-29 NOTE — PROGRESS NOTES
[facility]  Hematology/Oncology  Consult      Patient Name: Wilmer Fuentes  YOB: 1945  PCP: Todd Cross DO   Referring Provider:      Reason for Consultation:   No chief complaint on file.       History of Present Illness: 78-year-old man with past medical history relevant for hypertension, hyperlipidemia and anxiety hospitalized with progressively worsening dyspnea and leg edema.  No fever or chest pain nausea vomiting or hemoptysis.  His BMP on admission showed hyponatremia.  Serum creatinine was normal.  Hepatic panel with hypoalbuminemia with elevated alk phos with normal transaminases and normal bilirubin.  CBC with a hemoglobin of 13 with normal platelet and neutrophilic leukocytosis related to Solumedrol .  PSA was elevated at 20.9.  CEA was 5.0 with minimally elevated  at 37  Chest x-ray and CTA of the chest showed a large left pleural effusion with partial collapse of left upper lobe.  There was groundglass opacities in the right lung apex suggestive of infectious inflammatory process.  Suspicious sclerotic foci at T10 and T11 vertebral bodies noted which may represent metastatic disease .  Bone scans show multiple focal areas of increased activity suspicious for metastatic disease most prominent in the right posterior calvarium and thoracolumbar spine as well as left posterior iliac wing and right hip intertrochanteric area.  CT abdomen, pelvis and CT head pending  Left-sided thoracentesis performed and fluid was exudative with markedly elevated total proteins and LDH highly consistent with malignant effusion and cytology pending.        Diagnostic Data:     Past Medical History:   Diagnosis Date    Anxiety     Cancer (HCC)     Constipation     GERD (gastroesophageal reflux disease)     Hearing loss     Hyperlipidemia     Hypertension     Urinary incontinence        Patient Active Problem List    Diagnosis Date Noted    Poor venous access 01/09/2024    Pleural effusion

## 2024-01-29 NOTE — TELEPHONE ENCOUNTER
SW met with pt and wife after their appointment with Dr. Davis.  SW introduced self in person to pt and wife and discussed role of oncology social work.  Pt denied any needs at this time and was encouraged to reach out to this provider should any needs arise.  Pt and wife reported understanding.        Elver CRUZ, THOMAS-S  Oncology Social Worker

## 2024-01-29 NOTE — TELEPHONE ENCOUNTER
Per Dr. Davis, patient's ECOG is a 1. Voiced understanding and will apply for a prior authorization for treatment.

## 2024-01-30 ENCOUNTER — TELEPHONE (OUTPATIENT)
Dept: INFUSION THERAPY | Age: 79
End: 2024-01-30

## 2024-01-30 ENCOUNTER — HOSPITAL ENCOUNTER (OUTPATIENT)
Dept: INTERVENTIONAL RADIOLOGY/VASCULAR | Age: 79
Discharge: HOME | End: 2024-01-30
Payer: MEDICARE

## 2024-01-30 DIAGNOSIS — C79.51 CHOLANGIOCARCINOMA METASTATIC TO BONE (HCC): ICD-10-CM

## 2024-01-30 DIAGNOSIS — C22.1 CHOLANGIOCARCINOMA METASTATIC TO BONE (HCC): ICD-10-CM

## 2024-01-30 DIAGNOSIS — C79.51 CHOLANGIOCARCINOMA METASTATIC TO BONE (HCC): Primary | ICD-10-CM

## 2024-01-30 DIAGNOSIS — C22.1 CHOLANGIOCARCINOMA METASTATIC TO BONE (HCC): Primary | ICD-10-CM

## 2024-01-30 LAB
MICROORGANISM SPEC CULT: NORMAL
MICROORGANISM SPEC CULT: NORMAL
MICROORGANISM/AGENT SPEC: NORMAL
MICROORGANISM/AGENT SPEC: NORMAL
SPECIMEN DESCRIPTION: NORMAL
SPECIMEN DESCRIPTION: NORMAL

## 2024-01-30 PROCEDURE — 6360000004 HC RX CONTRAST MEDICATION: Performed by: RADIOLOGY

## 2024-01-30 PROCEDURE — 36598 INJ W/FLUOR EVAL CV DEVICE: CPT

## 2024-01-30 RX ORDER — PROCHLORPERAZINE MALEATE 10 MG
10 TABLET ORAL EVERY 6 HOURS PRN
Qty: 80 TABLET | Refills: 3 | Status: SHIPPED | OUTPATIENT
Start: 2024-01-30

## 2024-01-30 RX ADMIN — IOPAMIDOL 6 ML: 612 INJECTION, SOLUTION INTRAVENOUS at 12:51

## 2024-01-30 NOTE — PROGRESS NOTES
Spoke with patient/wife via telephone about treatment medications (durvalumab,gemcitabine,cisplatin).    We went over the protocol to be used, what to expect as far as side effects, and when to call with problems.  This was intended to reinforce the education done by Dr. Davis.    Prescriptions were sent to patient's local pharmacy for prochlorperazine .    Patient was provided medication handout to take home and patient was told to call if there are any questions once they had a chance to absorb the information.    Patient had the opportunity to ask questions with all questions being answered to their satisfaction.  The patient expressed understanding and acceptance of instructions.

## 2024-01-30 NOTE — PROGRESS NOTES
Patient came to special procedures for mediport check which was ordered by Dr. Davis.   Patient was educated about the amount of radiation used with today's procedure.    Reason for exam is malfunctioning port, no blood return.      Accessed patients mediport with 20 gauge x 0.75 inch gomez needle with sterile technique.      Able to flush port but no blood return.     Test finished.  Dr. Turpin spoke with patient.  Mediport flushed with 0.9% Sodium Chloride and heparin.  Mediport was de accessed.  Dry dressing was applied.     See radiology report for findings.    Patient discharged home.

## 2024-01-30 NOTE — TELEPHONE ENCOUNTER
Returned call to [facility] insurance several times with regards to patient's pending Xgeva case [id number], no answer each time. Left several messages for Lisa and Eric to please return call to [phone]. Awaiting a return call and or a determination for this case.

## 2024-01-31 ENCOUNTER — TELEPHONE (OUTPATIENT)
Dept: ONCOLOGY | Age: 79
End: 2024-01-31

## 2024-01-31 ENCOUNTER — TELEPHONE (OUTPATIENT)
Dept: CASE MANAGEMENT | Age: 79
End: 2024-01-31

## 2024-01-31 ENCOUNTER — OFFICE VISIT (OUTPATIENT)
Dept: ONCOLOGY | Age: 79
End: 2024-01-31

## 2024-01-31 ENCOUNTER — TELEPHONE (OUTPATIENT)
Dept: PALLATIVE CARE | Age: 79
End: 2024-01-31

## 2024-01-31 ENCOUNTER — OFFICE VISIT (OUTPATIENT)
Dept: PALLATIVE CARE | Age: 79
End: 2024-01-31
Payer: MEDICARE

## 2024-01-31 ENCOUNTER — HOSPITAL ENCOUNTER (OUTPATIENT)
Dept: INFUSION THERAPY | Age: 79
Discharge: HOME | End: 2024-01-31
Payer: MEDICARE

## 2024-01-31 VITALS
SYSTOLIC BLOOD PRESSURE: 135 MMHG | HEART RATE: 83 BPM | OXYGEN SATURATION: 96 % | RESPIRATION RATE: 20 BRPM | TEMPERATURE: 97.6 F | DIASTOLIC BLOOD PRESSURE: 78 MMHG

## 2024-01-31 VITALS
BODY MASS INDEX: 25.52 KG/M2 | OXYGEN SATURATION: 100 % | SYSTOLIC BLOOD PRESSURE: 132 MMHG | HEART RATE: 85 BPM | DIASTOLIC BLOOD PRESSURE: 73 MMHG | TEMPERATURE: 97.2 F | WEIGHT: 183 LBS

## 2024-01-31 VITALS — WEIGHT: 183 LBS | BODY MASS INDEX: 25.52 KG/M2

## 2024-01-31 DIAGNOSIS — C22.1 CHOLANGIOCARCINOMA METASTATIC TO BONE (HCC): Primary | ICD-10-CM

## 2024-01-31 DIAGNOSIS — C22.1 CHOLANGIOCARCINOMA METASTATIC TO BONE (HCC): ICD-10-CM

## 2024-01-31 DIAGNOSIS — C79.51 CHOLANGIOCARCINOMA METASTATIC TO BONE (HCC): ICD-10-CM

## 2024-01-31 DIAGNOSIS — C79.51 SECONDARY MALIGNANCY OF BONE OF LOWER EXTREMITY (HCC): Primary | ICD-10-CM

## 2024-01-31 DIAGNOSIS — R53.81 PHYSICAL DEBILITY: Primary | ICD-10-CM

## 2024-01-31 DIAGNOSIS — Z51.5 PALLIATIVE CARE BY SPECIALIST: ICD-10-CM

## 2024-01-31 DIAGNOSIS — C79.51 CHOLANGIOCARCINOMA METASTATIC TO BONE (HCC): Primary | ICD-10-CM

## 2024-01-31 DIAGNOSIS — R63.0 POOR APPETITE: ICD-10-CM

## 2024-01-31 DIAGNOSIS — C78.02 MALIGNANT NEOPLASM METASTATIC TO LEFT LUNG (HCC): ICD-10-CM

## 2024-01-31 DIAGNOSIS — R53.0 NEOPLASTIC MALIGNANT RELATED FATIGUE: ICD-10-CM

## 2024-01-31 DIAGNOSIS — G89.3 PAIN DUE TO NEOPLASM: ICD-10-CM

## 2024-01-31 LAB — MAGNESIUM SERPL-MCNC: 2 MG/DL (ref 1.6–2.6)

## 2024-01-31 PROCEDURE — 96375 TX/PRO/DX INJ NEW DRUG ADDON: CPT

## 2024-01-31 PROCEDURE — 96366 THER/PROPH/DIAG IV INF ADDON: CPT

## 2024-01-31 PROCEDURE — 1111F DSCHRG MED/CURRENT MED MERGE: CPT | Performed by: NURSE PRACTITIONER

## 2024-01-31 PROCEDURE — G8419 CALC BMI OUT NRM PARAM NOF/U: HCPCS | Performed by: NURSE PRACTITIONER

## 2024-01-31 PROCEDURE — 96417 CHEMO IV INFUS EACH ADDL SEQ: CPT

## 2024-01-31 PROCEDURE — G8484 FLU IMMUNIZE NO ADMIN: HCPCS | Performed by: NURSE PRACTITIONER

## 2024-01-31 PROCEDURE — G8427 DOCREV CUR MEDS BY ELIG CLIN: HCPCS | Performed by: NURSE PRACTITIONER

## 2024-01-31 PROCEDURE — 2580000003 HC RX 258: Performed by: INTERNAL MEDICINE

## 2024-01-31 PROCEDURE — 6360000002 HC RX W HCPCS: Performed by: INTERNAL MEDICINE

## 2024-01-31 PROCEDURE — 96415 CHEMO IV INFUSION ADDL HR: CPT

## 2024-01-31 PROCEDURE — 96413 CHEMO IV INFUSION 1 HR: CPT

## 2024-01-31 PROCEDURE — 96367 TX/PROPH/DG ADDL SEQ IV INF: CPT

## 2024-01-31 PROCEDURE — 99205 OFFICE O/P NEW HI 60 MIN: CPT | Performed by: NURSE PRACTITIONER

## 2024-01-31 PROCEDURE — 96361 HYDRATE IV INFUSION ADD-ON: CPT

## 2024-01-31 PROCEDURE — 6360000002 HC RX W HCPCS: Performed by: NURSE PRACTITIONER

## 2024-01-31 PROCEDURE — 1036F TOBACCO NON-USER: CPT | Performed by: NURSE PRACTITIONER

## 2024-01-31 PROCEDURE — 99203 OFFICE O/P NEW LOW 30 MIN: CPT | Performed by: NURSE PRACTITIONER

## 2024-01-31 PROCEDURE — 1123F ACP DISCUSS/DSCN MKR DOCD: CPT | Performed by: NURSE PRACTITIONER

## 2024-01-31 RX ORDER — ZOLEDRONIC ACID 0.04 MG/ML
4 INJECTION, SOLUTION INTRAVENOUS ONCE
Status: CANCELLED | OUTPATIENT
Start: 2024-01-31 | End: 2024-01-31

## 2024-01-31 RX ORDER — SODIUM CHLORIDE 0.9 % (FLUSH) 0.9 %
5-40 SYRINGE (ML) INJECTION PRN
Status: DISCONTINUED | OUTPATIENT
Start: 2024-01-31 | End: 2024-02-01 | Stop reason: HOSPADM

## 2024-01-31 RX ORDER — SODIUM CHLORIDE 9 MG/ML
INJECTION, SOLUTION INTRAVENOUS CONTINUOUS
Status: CANCELLED | OUTPATIENT
Start: 2024-01-31

## 2024-01-31 RX ORDER — SODIUM CHLORIDE 0.9 % (FLUSH) 0.9 %
5-40 SYRINGE (ML) INJECTION PRN
Status: CANCELLED | OUTPATIENT
Start: 2024-01-31

## 2024-01-31 RX ORDER — ONDANSETRON 4 MG/1
8 TABLET, FILM COATED ORAL EVERY 8 HOURS PRN
Qty: 15 TABLET | Refills: 3 | Status: SHIPPED | OUTPATIENT
Start: 2024-01-31

## 2024-01-31 RX ORDER — ONDANSETRON 2 MG/ML
8 INJECTION INTRAMUSCULAR; INTRAVENOUS
Status: CANCELLED | OUTPATIENT
Start: 2024-01-31

## 2024-01-31 RX ORDER — SODIUM CHLORIDE 9 MG/ML
5-250 INJECTION, SOLUTION INTRAVENOUS PRN
Status: CANCELLED | OUTPATIENT
Start: 2024-01-31

## 2024-01-31 RX ORDER — DIPHENHYDRAMINE HYDROCHLORIDE 50 MG/ML
50 INJECTION INTRAMUSCULAR; INTRAVENOUS
Status: CANCELLED | OUTPATIENT
Start: 2024-01-31

## 2024-01-31 RX ORDER — MEPERIDINE HYDROCHLORIDE 25 MG/ML
12.5 INJECTION INTRAMUSCULAR; INTRAVENOUS; SUBCUTANEOUS PRN
Status: CANCELLED | OUTPATIENT
Start: 2024-01-31

## 2024-01-31 RX ORDER — PALONOSETRON HYDROCHLORIDE 0.05 MG/ML
0.25 INJECTION, SOLUTION INTRAVENOUS ONCE
Status: COMPLETED | OUTPATIENT
Start: 2024-01-31 | End: 2024-01-31

## 2024-01-31 RX ORDER — HEPARIN 100 UNIT/ML
500 SYRINGE INTRAVENOUS PRN
Status: DISCONTINUED | OUTPATIENT
Start: 2024-01-31 | End: 2024-02-01 | Stop reason: HOSPADM

## 2024-01-31 RX ORDER — HEPARIN 100 UNIT/ML
500 SYRINGE INTRAVENOUS PRN
Status: CANCELLED | OUTPATIENT
Start: 2024-01-31

## 2024-01-31 RX ORDER — ALBUTEROL SULFATE 90 UG/1
4 AEROSOL, METERED RESPIRATORY (INHALATION) PRN
Status: CANCELLED | OUTPATIENT
Start: 2024-01-31

## 2024-01-31 RX ORDER — ACETAMINOPHEN 325 MG/1
650 TABLET ORAL
Status: CANCELLED | OUTPATIENT
Start: 2024-01-31

## 2024-01-31 RX ORDER — SODIUM CHLORIDE 9 MG/ML
5-250 INJECTION, SOLUTION INTRAVENOUS PRN
Status: DISCONTINUED | OUTPATIENT
Start: 2024-01-31 | End: 2024-02-01 | Stop reason: HOSPADM

## 2024-01-31 RX ORDER — ZOLEDRONIC ACID 0.04 MG/ML
4 INJECTION, SOLUTION INTRAVENOUS ONCE
Status: COMPLETED | OUTPATIENT
Start: 2024-01-31 | End: 2024-01-31

## 2024-01-31 RX ORDER — MIRTAZAPINE 15 MG/1
TABLET, FILM COATED ORAL
Qty: 53 TABLET | Refills: 0 | Status: SHIPPED | OUTPATIENT
Start: 2024-01-31 | End: 2024-03-31

## 2024-01-31 RX ORDER — DEXAMETHASONE SODIUM PHOSPHATE 10 MG/ML
10 INJECTION INTRAMUSCULAR; INTRAVENOUS ONCE
Status: COMPLETED | OUTPATIENT
Start: 2024-01-31 | End: 2024-01-31

## 2024-01-31 RX ORDER — EPINEPHRINE 1 MG/ML
0.3 INJECTION, SOLUTION, CONCENTRATE INTRAVENOUS PRN
Status: CANCELLED | OUTPATIENT
Start: 2024-01-31

## 2024-01-31 RX ADMIN — FOSAPREPITANT 150 MG: 150 INJECTION, POWDER, LYOPHILIZED, FOR SOLUTION INTRAVENOUS at 11:01

## 2024-01-31 RX ADMIN — GEMCITABINE 1800 MG: 38 INJECTION, SOLUTION INTRAVENOUS at 13:12

## 2024-01-31 RX ADMIN — DEXAMETHASONE SODIUM PHOSPHATE 10 MG: 10 INJECTION INTRAMUSCULAR; INTRAVENOUS at 10:33

## 2024-01-31 RX ADMIN — HEPARIN 500 UNITS: 100 SYRINGE at 16:28

## 2024-01-31 RX ADMIN — POTASSIUM CHLORIDE: 2 INJECTION, SOLUTION, CONCENTRATE INTRAVENOUS at 13:47

## 2024-01-31 RX ADMIN — PALONOSETRON 0.25 MG: 0.25 INJECTION, SOLUTION INTRAVENOUS at 10:30

## 2024-01-31 RX ADMIN — SODIUM CHLORIDE 1500 MG: 9 INJECTION, SOLUTION INTRAVENOUS at 11:56

## 2024-01-31 RX ADMIN — Medication 10 ML: at 16:28

## 2024-01-31 RX ADMIN — ZOLEDRONIC ACID 4 MG: 0.04 INJECTION, SOLUTION INTRAVENOUS at 11:30

## 2024-01-31 RX ADMIN — SODIUM CHLORIDE 20 ML/HR: 9 INJECTION, SOLUTION INTRAVENOUS at 10:25

## 2024-01-31 ASSESSMENT — PAIN DESCRIPTION - ORIENTATION: ORIENTATION: RIGHT

## 2024-01-31 ASSESSMENT — PAIN DESCRIPTION - LOCATION: LOCATION: ARM

## 2024-01-31 ASSESSMENT — PAIN DESCRIPTION - ONSET: ONSET: ON-GOING

## 2024-01-31 ASSESSMENT — PAIN DESCRIPTION - DESCRIPTORS: DESCRIPTORS: ACHING

## 2024-01-31 ASSESSMENT — PAIN SCALES - GENERAL: PAINLEVEL_OUTOF10: 3

## 2024-01-31 ASSESSMENT — PAIN - FUNCTIONAL ASSESSMENT: PAIN_FUNCTIONAL_ASSESSMENT: ACTIVITIES ARE NOT PREVENTED

## 2024-01-31 ASSESSMENT — PAIN DESCRIPTION - FREQUENCY: FREQUENCY: CONTINUOUS

## 2024-01-31 NOTE — TELEPHONE ENCOUNTER
Provided patent with an audiology evaluation appointment scheduled for 02/05/2024 at 1515 at the [facility] and their department contact number.Lisa Pérez  RN, ADN, BSE, OCN  Patient Nurse Navigator

## 2024-01-31 NOTE — TELEPHONE ENCOUNTER
Palliative Medicine  Social Work Progress Note      Patient: Wilmer Fuentes     met with pt, pt wife and pt daughter during New patient Palliative care appt. Introduced self and role of Palliative care  and ways that SW can be of use to pt and family as needed. No major needs at this time for pt, pt and family requested a wheelchair order for pt to be placed. ZIGGY Valenzuela will place orders for pt to have a lightweight wheelchair. Orders will be sent to [facility] per pt and family request. No other needs identified at this time. Will continue to follow as needed.      Vannessa Arriaga MSW,LSW  Palliative Medicine

## 2024-01-31 NOTE — TELEPHONE ENCOUNTER
SW briefly followed up with pt at first infusion.  Pt denied any needs at this time.  Pt was encouraged to reach out to this provider should any needs arise.      Elver CRUZ, THOMAS-S  Oncology Social Worker

## 2024-01-31 NOTE — TELEPHONE ENCOUNTER
Dr. Davis updated [facility] called this morning stating they are unable to do the NGS testing on the bone tissue since is was decalcified but are completing the liquid biopsy. He verbalized understanding, no new orders received. Lisa Pérez  RN, ADN, BSE, OCN  Patient Nurse Navigator

## 2024-01-31 NOTE — PROGRESS NOTES
Palliative Care Department  Provider: MERLENE Hahn - CNP    Referring Provider: Dr. Davis    Location of Service:   [facility]    Chief Complaint: Wilmer Fuentes is a 78 y.o. 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 Davis   -  Diagnosed in Jan. 2024   -  s/p pleurex catheter placement   -  to start treatment with Gemcitabine/Cisplatin and Durvalumab    Pain related to Neoplasm:   -  Ibuprofen increase to 400 mg TID PRN   -  Declined need for opioid therapy    Poor appetite:   -  Mirtazapine     Fatigue/physical Debility/Weakness:   -  Needs a wheelchair to assist with mobility   -  Has [facility] following with [facility]    Palliative Care Encounter:      - Goals of care: live longer, improve or maintain function/quality of life, and continue current management    -He has a Living Will, copy to be scanned to Epic media     - Code Status: full code    Prognosis: unknown    Referrals to: none today      Follow Up:  4 weeks.  They were encouraged to call with any questions, concerns, needs, or changes in symptoms.    Subjective:     HPI:  Wilmer Fuentes is a 78 y.o. male diagnosed in January 2024 with metastatic cholangiocarcinoma, possibly with secondary cancer, with metastasis to his bones, as well as malignant pleural effusion.  He is following with Dr. Davis, and is to start treatment.  He was referred to [facility] Palliative Medicine symptomatic management.    Subjective/Events/Discussions:  Mr. Fuentes is seen today accompanied by his wife and daughter  He is alert and able to voice his needs and concerns well  He complains of pain, primarily in his right shoulder, as well as within his hips, this is likely related to bone metastasis  We discussed this further, and he is currently utilizing heating

## 2024-01-31 NOTE — PROGRESS NOTES
and PSA stains.  Clinical and radiologic correlation   is needed.     Intradepartmental consultation is obtained.     Patient's prior positive pleural fluid showing adenocarcinoma      His CT scan of abdomen and pelvis done recently showed normal liver spleen and pancreas.  Bowel pattern was unremarkable without any suspicious bowel wall thickening.  There was no evidence of intra-abdominal lymphadenopathy or ascites.  Only multiple mixed lytic and sclerotic lesions were noted involving bilateral femurs pubic bones iliac bones and spine.    Clinically his presentation fits best lung cancer with his massive malignant left-sided pleural effusion requiring CT drainage with conversion into Pleurx catheter.    His cellblock to be sent for tissue of origin assay  PET CT scan to be done    NGS on liquid with reflex to cellblock will also be done with consideration for treatment after PET/CT reviewed.    Patient is being followed by home care nurse agency to drain his Pleurx catheter and on Friday he had 250 cc of serosanguineous fluid removed and it is being done 3 times per week.    Patient is a Jehovah witness and is not to receive any form of blood transfusion but is agreeable to [facility] when needed    He is requesting palliative care consult.    1/29/2024  Patient continues to have significant drainage from his Pleurx catheter.  His PET CT scan showed the following:    Extensive osseous hypermetabolic metastatic disease involving the cervical,  thoracic and lumbar spine, the pelvis, the multiple ribs, scapulae and  proximal femurs.     Focal hypermetabolic activity in dense left lower lobe airspace disease may  represent obscured pulmonary mass.  The curvilinear increased metabolic  activity within the left pleura the is also worrisome for metastasis.     There is hypermetabolism within the inferior pancreatic head which may  represent a primary or secondary focus of malignancy.     Central right hepatic lobe

## 2024-01-31 NOTE — DISCHARGE INSTR - COC
Conduit: {YES / NO:27890}       Date of Last BM: ***  No intake or output data in the 24 hours ending 24 1506  No intake/output data recorded.    Safety Concerns:     { BERTO Safety Concerns:360812745}    Impairments/Disabilities:      { BERTO Impairments/Disabilities:887860099}    Nutrition Therapy:  Current Nutrition Therapy:   { BERTO Diet List:533981608}    Routes of Feeding: {[facility] Other Feedings:463617624}  Liquids: {Slp liquid thickness:94505}  Daily Fluid Restriction: {[facility] Yes amt example:088911244}  Last Modified Barium Swallow with Video (Video Swallowing Test): {Done Not Done Date:692406726}    Treatments at the Time of Hospital Discharge:   Respiratory Treatments: ***  Oxygen Therapy:  {Therapy; copd oxygen:45761}  Ventilator:    { CC Vent List:812270095}    Rehab Therapies: {THERAPEUTIC INTERVENTION:0249340077}  Weight Bearing Status/Restrictions: {[facility] Weight Bearin}  Other Medical Equipment (for information only, NOT a DME order):  {EQUIPMENT:140741591}  Other Treatments: ***    Patient's personal belongings (please select all that are sent with patient):  {[facility] DME Belongings:302356782}    RN SIGNATURE:  {Esignature:341128484}    CASE MANAGEMENT/SOCIAL WORK SECTION    Inpatient Status Date: ***    Readmission Risk Assessment Score:  Readmission Risk              Risk of Unplanned Readmission:  0           Discharging to Facility/ Agency   Name:   Address:  Phone:  Fax:    Dialysis Facility (if applicable)   Name:  Address:  Dialysis Schedule:  Phone:  Fax:    / signature: {Esignature:181474271}    PHYSICIAN SECTION    Prognosis: {Prognosis:3698459611}    Condition at Discharge: { Patient Condition:065378229}    Rehab Potential (if transferring to Rehab): {Prognosis:1340246972}    Recommended Labs or Other Treatments After Discharge: ***    Physician Certification: I certify the above information and transfer of Wilmer Alfredo  is necessary for the

## 2024-02-02 ENCOUNTER — HOSPITAL ENCOUNTER (EMERGENCY)
Age: 79
Discharge: HOME | End: 2024-02-02
Attending: EMERGENCY MEDICINE
Payer: MEDICARE

## 2024-02-02 ENCOUNTER — HOSPITAL ENCOUNTER (OUTPATIENT)
Dept: INFUSION THERAPY | Age: 79
End: 2024-02-02

## 2024-02-02 ENCOUNTER — APPOINTMENT (OUTPATIENT)
Dept: CT IMAGING | Age: 79
End: 2024-02-02
Payer: MEDICARE

## 2024-02-02 VITALS
SYSTOLIC BLOOD PRESSURE: 116 MMHG | OXYGEN SATURATION: 100 % | HEART RATE: 90 BPM | RESPIRATION RATE: 18 BRPM | HEIGHT: 70 IN | DIASTOLIC BLOOD PRESSURE: 69 MMHG | BODY MASS INDEX: 26.48 KG/M2 | WEIGHT: 185 LBS | TEMPERATURE: 97.6 F

## 2024-02-02 DIAGNOSIS — C79.51 MALIGNANT NEOPLASM METASTATIC TO BONE (HCC): Primary | ICD-10-CM

## 2024-02-02 DIAGNOSIS — J94.2 HEMOTHORAX ON LEFT: ICD-10-CM

## 2024-02-02 LAB
ANION GAP SERPL CALCULATED.3IONS-SCNC: 8 MMOL/L (ref 7–16)
APPEARANCE FLD: NORMAL
BASOPHILS # BLD: 0.01 K/UL (ref 0–0.2)
BASOPHILS NFR BLD: 0 % (ref 0–2)
BODY FLD TYPE: NORMAL
BUN SERPL-MCNC: 23 MG/DL (ref 6–23)
CALCIUM SERPL-MCNC: 7.8 MG/DL (ref 8.6–10.2)
CHLORIDE SERPL-SCNC: 97 MMOL/L (ref 98–107)
CLOT CHECK: NORMAL
CO2 SERPL-SCNC: 27 MMOL/L (ref 22–29)
COLOR FLD: NORMAL
CREAT SERPL-MCNC: 0.9 MG/DL (ref 0.7–1.2)
EKG ATRIAL RATE: 89 BPM
EKG P AXIS: 51 DEGREES
EKG P-R INTERVAL: 132 MS
EKG Q-T INTERVAL: 348 MS
EKG QRS DURATION: 86 MS
EKG QTC CALCULATION (BAZETT): 423 MS
EKG R AXIS: 33 DEGREES
EKG T AXIS: 49 DEGREES
EKG VENTRICULAR RATE: 89 BPM
EOSINOPHIL # BLD: 0.04 K/UL (ref 0.05–0.5)
EOSINOPHILS RELATIVE PERCENT: 0 % (ref 0–6)
ERYTHROCYTE [DISTWIDTH] IN BLOOD BY AUTOMATED COUNT: 13.7 % (ref 11.5–15)
GFR SERPL CREATININE-BSD FRML MDRD: >60 ML/MIN/1.73M2
GLUCOSE SERPL-MCNC: 165 MG/DL (ref 74–99)
HCT VFR BLD AUTO: 33.9 % (ref 37–54)
HGB BLD-MCNC: 11.2 G/DL (ref 12.5–16.5)
IMM GRANULOCYTES # BLD AUTO: 0.1 K/UL (ref 0–0.58)
IMM GRANULOCYTES NFR BLD: 1 % (ref 0–5)
LYMPHOCYTES NFR BLD: 1.13 K/UL (ref 1.5–4)
LYMPHOCYTES RELATIVE PERCENT: 7 % (ref 20–42)
MCH RBC QN AUTO: 30.9 PG (ref 26–35)
MCHC RBC AUTO-ENTMCNC: 33 G/DL (ref 32–34.5)
MCV RBC AUTO: 93.4 FL (ref 80–99.9)
MONOCYTES NFR BLD: 0.32 K/UL (ref 0.1–0.95)
MONOCYTES NFR BLD: 2 % (ref 2–12)
MONOCYTES NFR FLD: 20 %
NEUTROPHILS NFR BLD: 90 % (ref 43–80)
NEUTROPHILS NFR FLD: 80 %
NEUTS SEG NFR BLD: 14.15 K/UL (ref 1.8–7.3)
NUC CELL # FLD: NORMAL CELLS/UL
PLATELET # BLD AUTO: 214 K/UL (ref 130–450)
PMV BLD AUTO: 9.2 FL (ref 7–12)
POTASSIUM SERPL-SCNC: 4.3 MMOL/L (ref 3.5–5)
RBC # BLD AUTO: 3.63 M/UL (ref 3.8–5.8)
RBC # FLD: NORMAL CELLS/UL
SODIUM SERPL-SCNC: 132 MMOL/L (ref 132–146)
WBC # FLD: 2636 CELLS/UL
WBC OTHER # BLD: 15.8 K/UL (ref 4.5–11.5)

## 2024-02-02 PROCEDURE — 72125 CT NECK SPINE W/O DYE: CPT

## 2024-02-02 PROCEDURE — 93005 ELECTROCARDIOGRAM TRACING: CPT | Performed by: EMERGENCY MEDICINE

## 2024-02-02 PROCEDURE — 99285 EMERGENCY DEPT VISIT HI MDM: CPT

## 2024-02-02 PROCEDURE — 80048 BASIC METABOLIC PNL TOTAL CA: CPT

## 2024-02-02 PROCEDURE — 6360000004 HC RX CONTRAST MEDICATION: Performed by: RADIOLOGY

## 2024-02-02 PROCEDURE — 2580000003 HC RX 258: Performed by: EMERGENCY MEDICINE

## 2024-02-02 PROCEDURE — 6360000002 HC RX W HCPCS: Performed by: EMERGENCY MEDICINE

## 2024-02-02 PROCEDURE — 96374 THER/PROPH/DIAG INJ IV PUSH: CPT

## 2024-02-02 PROCEDURE — 93010 ELECTROCARDIOGRAM REPORT: CPT | Performed by: INTERNAL MEDICINE

## 2024-02-02 PROCEDURE — 71260 CT THORAX DX C+: CPT

## 2024-02-02 PROCEDURE — 85025 COMPLETE CBC W/AUTO DIFF WBC: CPT

## 2024-02-02 PROCEDURE — 89051 BODY FLUID CELL COUNT: CPT

## 2024-02-02 RX ORDER — SODIUM CHLORIDE 9 MG/ML
5-250 INJECTION, SOLUTION INTRAVENOUS PRN
OUTPATIENT
Start: 2024-02-02

## 2024-02-02 RX ORDER — 0.9 % SODIUM CHLORIDE 0.9 %
500 INTRAVENOUS SOLUTION INTRAVENOUS ONCE
OUTPATIENT
Start: 2024-02-02 | End: 2024-02-02

## 2024-02-02 RX ORDER — DIPHENHYDRAMINE HYDROCHLORIDE 50 MG/ML
50 INJECTION INTRAMUSCULAR; INTRAVENOUS
OUTPATIENT
Start: 2024-02-02

## 2024-02-02 RX ORDER — ONDANSETRON 2 MG/ML
8 INJECTION INTRAMUSCULAR; INTRAVENOUS
OUTPATIENT
Start: 2024-02-02

## 2024-02-02 RX ORDER — SODIUM CHLORIDE 9 MG/ML
INJECTION, SOLUTION INTRAVENOUS CONTINUOUS
OUTPATIENT
Start: 2024-02-02

## 2024-02-02 RX ORDER — HEPARIN 100 UNIT/ML
500 SYRINGE INTRAVENOUS PRN
OUTPATIENT
Start: 2024-02-02

## 2024-02-02 RX ORDER — ACETAMINOPHEN 325 MG/1
650 TABLET ORAL
OUTPATIENT
Start: 2024-02-02

## 2024-02-02 RX ORDER — 0.9 % SODIUM CHLORIDE 0.9 %
1000 INTRAVENOUS SOLUTION INTRAVENOUS ONCE
Status: COMPLETED | OUTPATIENT
Start: 2024-02-02 | End: 2024-02-02

## 2024-02-02 RX ORDER — LIDOCAINE HYDROCHLORIDE 10 MG/ML
0.25 INJECTION, SOLUTION EPIDURAL; INFILTRATION; INTRACAUDAL; PERINEURAL
OUTPATIENT
Start: 2024-02-02

## 2024-02-02 RX ORDER — SODIUM CHLORIDE 0.9 % (FLUSH) 0.9 %
5-40 SYRINGE (ML) INJECTION PRN
OUTPATIENT
Start: 2024-02-02

## 2024-02-02 RX ORDER — FENTANYL CITRATE 0.05 MG/ML
50 INJECTION, SOLUTION INTRAMUSCULAR; INTRAVENOUS ONCE
Status: COMPLETED | OUTPATIENT
Start: 2024-02-02 | End: 2024-02-02

## 2024-02-02 RX ORDER — ALBUTEROL SULFATE 2.5 MG/3ML
2.5 SOLUTION RESPIRATORY (INHALATION)
OUTPATIENT
Start: 2024-02-02

## 2024-02-02 RX ORDER — FAMOTIDINE 10 MG/ML
20 INJECTION, SOLUTION INTRAVENOUS
OUTPATIENT
Start: 2024-02-02

## 2024-02-02 RX ORDER — EPINEPHRINE 1 MG/ML
0.3 INJECTION, SOLUTION, CONCENTRATE INTRAVENOUS PRN
OUTPATIENT
Start: 2024-02-02

## 2024-02-02 RX ADMIN — FENTANYL CITRATE 50 MCG: 0.05 INJECTION, SOLUTION INTRAMUSCULAR; INTRAVENOUS at 11:45

## 2024-02-02 RX ADMIN — IOPAMIDOL 70 ML: 755 INJECTION, SOLUTION INTRAVENOUS at 12:55

## 2024-02-02 RX ADMIN — SODIUM CHLORIDE 1000 ML: 9 INJECTION, SOLUTION INTRAVENOUS at 11:44

## 2024-02-02 ASSESSMENT — PAIN SCALES - GENERAL
PAINLEVEL_OUTOF10: 3
PAINLEVEL_OUTOF10: 4

## 2024-02-02 ASSESSMENT — PAIN DESCRIPTION - ORIENTATION: ORIENTATION: RIGHT

## 2024-02-02 ASSESSMENT — PAIN DESCRIPTION - LOCATION
LOCATION: NECK;ARM
LOCATION: NECK

## 2024-02-02 ASSESSMENT — PAIN - FUNCTIONAL ASSESSMENT: PAIN_FUNCTIONAL_ASSESSMENT: 0-10

## 2024-02-02 NOTE — ED PROVIDER NOTES
[facility] EMERGENCY DEPARTMENT  EMERGENCY DEPARTMENT ENCOUNTER        Pt Name: Wilmer Fuentes  MRN: [id number]  Birthdate 1945  Date of evaluation: 2/2/2024  Provider: Elver Roche DO  PCP: Todd Cross DO  Note Started: 11:37 AM EST 2/2/24    CHIEF COMPLAINT       Chief Complaint   Patient presents with    Hypotension     Patient had fluid removed from left lung this am by home health nurse, drained approx 300cc, patient became hypotensive with bp in 70s    Neck Pain     Patient states right sided neck and shoulder pain which began this morning, states pain improved at this time, but still 3/10       HISTORY OF PRESENT ILLNESS: 1 or more Elements   History From: patient    Limitations to history : None    Wilmer Fuentes is a 78 y.o. male who presents diagnosis of cholangiole carcinoma with metastatic disease to bone presents to the ED with a complaint of neck pain as well as right shoulder blade pain.  Visiting nurse came to his home today and noticed that he was hypotensive.  Patient was denying any dizziness or lightness.  No syncope.  No chest pain or shortness of breath.  She also noticed that when she drained the Pleurx catheter it was mostly blood.  She drained approximately 300 cc of bloody fluid.    Nursing Notes were all reviewed and agreed with or any disagreements were addressed in the HPI.      REVIEW OF EXTERNAL NOTE :       Chart review shows that on hematology progress note on 1/31/2024 patient has known sclerotic foci of T10 and T11 which is favored to represent metastatic disease.  Patient also had large left pleural effusion with partial lung collapse.  Patient recently had Pleurx catheter placed by pulmonology/intensivist    REVIEW OF SYSTEMS :           Positives and Pertinent negatives as per HPI.     SURGICAL HISTORY     Past Surgical History:   Procedure Laterality Date    ADRENALECTOMY      CATHETER INSERTION N/A 01/09/2024    MEDIPORT CATHETER  in no distress.  No conversational dyspnea or accessory muscle use.  No diaphoresis or pallor. Differential diagnoses included but not limited to persistent pleural effusion, hemothorax, hypotension from volume loss, metastatic disease to the right shoulder blade and cervical spine. Workup in the ED consisted of appropriate labs and imaging.  The following labs were evaluated interpreted by me.  CBC shows no evidence of anemia.  Mild leukocytosis.  BMP shows no electrolyte abnormalities or evidence of renal insufficiency.  CT of the cervical spine showed no acute fracture detected.  There are lytic lesions noted at C7 and T2.  Degenerative changes noted as well.  CT of the chest interpreted by the radiologist as showing small left hydropneumothorax with left chest tube positioned as discussed above.  Left lung consolidation with pleural thickening consistent with primary neoplasm or metastatic disease.  There is left chest wall extension of neoplasm noted.  Multiple hypodense right hepatic lesions consistent with metastatic disease.  Right perinephric nodule, probable lymph node.  Scattered mixed lytic and sclerotic osseous lesions consistent with metastatic disease.  I did discuss these findings with Dr. Johnson for pulmonology.  He mentioned that is not unusual to have blood in the pleural fluid specially with metastatic disease to the lung.  Did not recommend further admission unless patient is hypotensive or hypoxic or in signs of distress.  Patient is comfortable in bed and is not hypoxic or hypotensive.  Patient was given IV fluids in the ED. Patient continues to be non-toxic on re-evaluation. Findings were discussed with the patient and reasons to immediately return to the ED were articulated to them. They will follow-up with their PMD and oncology.      CONSULTS: (Who and What was discussed)  Pulmonology/intensivist      I am the Primary Clinician of Record.    FINAL IMPRESSION      1. Malignant neoplasm

## 2024-02-05 ENCOUNTER — PROCEDURE VISIT (OUTPATIENT)
Dept: AUDIOLOGY | Age: 79
End: 2024-02-05
Payer: MEDICARE

## 2024-02-05 DIAGNOSIS — C79.51 SECONDARY MALIGNANCY OF BONE OF LOWER EXTREMITY (HCC): ICD-10-CM

## 2024-02-05 DIAGNOSIS — C22.1 CHOLANGIOCARCINOMA METASTATIC TO BONE (HCC): ICD-10-CM

## 2024-02-05 DIAGNOSIS — S12.090A COMPRESSION FRACTURE OF C1 VERTEBRA, INITIAL ENCOUNTER (HCC): ICD-10-CM

## 2024-02-05 DIAGNOSIS — H90.A22 SENSORINEURAL HEARING LOSS (SNHL) OF LEFT EAR WITH RESTRICTED HEARING OF RIGHT EAR: Primary | ICD-10-CM

## 2024-02-05 DIAGNOSIS — C79.51 CHOLANGIOCARCINOMA METASTATIC TO BONE (HCC): ICD-10-CM

## 2024-02-05 DIAGNOSIS — S12.090A COMPRESSION FRACTURE OF C1 VERTEBRA, INITIAL ENCOUNTER (HCC): Primary | ICD-10-CM

## 2024-02-05 DIAGNOSIS — H90.A31 MIXED CONDUCTIVE AND SENSORINEURAL HEARING LOSS OF RIGHT EAR WITH RESTRICTED HEARING OF LEFT EAR: ICD-10-CM

## 2024-02-05 LAB
APPEARANCE FLD: NORMAL
BODY FLD TYPE: NORMAL
CLOT CHECK: NORMAL
COLOR FLD: NORMAL
MANUAL DIF COMMENT FLD-IMP: NORMAL
MONOCYTES NFR FLD: 20 %
NEUTROPHILS NFR FLD: 80 %
NUC CELL # FLD: NORMAL CELLS/UL
RBC # FLD: NORMAL CELLS/UL
WBC # FLD: 2636 CELLS/UL

## 2024-02-05 PROCEDURE — 92567 TYMPANOMETRY: CPT | Performed by: AUDIOLOGIST

## 2024-02-05 PROCEDURE — 92588 EVOKED AUDITORY TST COMPLETE: CPT | Performed by: AUDIOLOGIST

## 2024-02-05 PROCEDURE — 92557 COMPREHENSIVE HEARING TEST: CPT | Performed by: AUDIOLOGIST

## 2024-02-05 NOTE — PROGRESS NOTES
This patient was referred for audiometric and tympanometric testing due to  oncology treatments.  He was accompanied by his wife.  He reports a known hearing loss for which he wears hearing from the [employer] .  He notes one ear to be better than the other.    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 Reliability was good. Speech reception thresholds were in good agreement with the pure tone averages, bilaterally. Speech discrimination scores were good, bilaterally.    High frequency emphasis audiometry yielded no response to the limits of the audiometer 9k-20 K Hz.      Distortion product otoacoustic emissions (DPOAE) testing was conducted bilaterally and revealed absent cochlear responses, bilaterally.     Tympanometry revealed normal middle ear peak pressure and compliance, bilaterally.    The results were reviewed with the patient.     Recommendations for follow up will be made pending physician consult.    Electronically signed by Vanna Foley on 2/5/2024 at 2:03 PM

## 2024-02-06 ENCOUNTER — HOSPITAL ENCOUNTER (EMERGENCY)
Age: 79
Discharge: HOME | End: 2024-02-07
Attending: EMERGENCY MEDICINE
Payer: MEDICARE

## 2024-02-06 DIAGNOSIS — J90 RECURRENT LEFT PLEURAL EFFUSION: ICD-10-CM

## 2024-02-06 DIAGNOSIS — D64.9 ANEMIA, UNSPECIFIED TYPE: ICD-10-CM

## 2024-02-06 DIAGNOSIS — Z85.9 HISTORY OF CANCER: ICD-10-CM

## 2024-02-06 DIAGNOSIS — D69.6 THROMBOCYTOPENIA (HCC): ICD-10-CM

## 2024-02-06 DIAGNOSIS — R06.09 EXERTIONAL DYSPNEA: Primary | ICD-10-CM

## 2024-02-06 DIAGNOSIS — Z79.899 ON ANTINEOPLASTIC CHEMOTHERAPY: ICD-10-CM

## 2024-02-06 DIAGNOSIS — R79.89 ELEVATED LFTS: ICD-10-CM

## 2024-02-06 LAB
MICROORGANISM SPEC CULT: NORMAL
MICROORGANISM/AGENT SPEC: NORMAL
SPECIMEN DESCRIPTION: NORMAL

## 2024-02-06 PROCEDURE — 99285 EMERGENCY DEPT VISIT HI MDM: CPT

## 2024-02-06 RX ORDER — MORPHINE SULFATE 5 MG/ML
5 INJECTION, SOLUTION INTRAMUSCULAR; INTRAVENOUS ONCE
Status: DISCONTINUED | OUTPATIENT
Start: 2024-02-07 | End: 2024-02-07

## 2024-02-07 ENCOUNTER — OFFICE VISIT (OUTPATIENT)
Dept: ONCOLOGY | Age: 79
End: 2024-02-07

## 2024-02-07 ENCOUNTER — TELEPHONE (OUTPATIENT)
Dept: ONCOLOGY | Age: 79
End: 2024-02-07

## 2024-02-07 ENCOUNTER — TELEPHONE (OUTPATIENT)
Dept: INFUSION THERAPY | Age: 79
End: 2024-02-07

## 2024-02-07 ENCOUNTER — APPOINTMENT (OUTPATIENT)
Dept: CT IMAGING | Age: 79
End: 2024-02-07
Payer: MEDICARE

## 2024-02-07 ENCOUNTER — HOSPITAL ENCOUNTER (OUTPATIENT)
Dept: INFUSION THERAPY | Age: 79
Discharge: HOME | End: 2024-02-07
Payer: MEDICARE

## 2024-02-07 ENCOUNTER — TELEPHONE (OUTPATIENT)
Dept: PALLATIVE CARE | Age: 79
End: 2024-02-07

## 2024-02-07 ENCOUNTER — HOSPITAL ENCOUNTER (OUTPATIENT)
Dept: INFUSION THERAPY | Age: 79
Discharge: HOME | End: 2024-02-07

## 2024-02-07 VITALS
HEART RATE: 99 BPM | TEMPERATURE: 97.3 F | OXYGEN SATURATION: 98 % | DIASTOLIC BLOOD PRESSURE: 83 MMHG | SYSTOLIC BLOOD PRESSURE: 139 MMHG | RESPIRATION RATE: 28 BRPM

## 2024-02-07 VITALS
OXYGEN SATURATION: 99 % | SYSTOLIC BLOOD PRESSURE: 113 MMHG | BODY MASS INDEX: 26.77 KG/M2 | TEMPERATURE: 97.8 F | HEART RATE: 83 BPM | DIASTOLIC BLOOD PRESSURE: 68 MMHG | WEIGHT: 187 LBS | HEIGHT: 70 IN

## 2024-02-07 VITALS — HEART RATE: 79 BPM | SYSTOLIC BLOOD PRESSURE: 140 MMHG | DIASTOLIC BLOOD PRESSURE: 83 MMHG | TEMPERATURE: 96.3 F

## 2024-02-07 DIAGNOSIS — C22.1 CHOLANGIOCARCINOMA METASTATIC TO BONE (HCC): ICD-10-CM

## 2024-02-07 DIAGNOSIS — C79.51 CHOLANGIOCARCINOMA METASTATIC TO BONE (HCC): ICD-10-CM

## 2024-02-07 DIAGNOSIS — D50.0 IRON DEFICIENCY ANEMIA DUE TO CHRONIC BLOOD LOSS: Primary | ICD-10-CM

## 2024-02-07 DIAGNOSIS — D64.81 ANTINEOPLASTIC CHEMOTHERAPY INDUCED ANEMIA: ICD-10-CM

## 2024-02-07 DIAGNOSIS — T45.1X5A ANTINEOPLASTIC CHEMOTHERAPY INDUCED ANEMIA: ICD-10-CM

## 2024-02-07 DIAGNOSIS — C79.51 CHOLANGIOCARCINOMA METASTATIC TO BONE (HCC): Primary | ICD-10-CM

## 2024-02-07 DIAGNOSIS — G89.3 PAIN DUE TO NEOPLASM: Primary | ICD-10-CM

## 2024-02-07 DIAGNOSIS — D50.0 IRON DEFICIENCY ANEMIA DUE TO CHRONIC BLOOD LOSS: ICD-10-CM

## 2024-02-07 DIAGNOSIS — Z51.5 PALLIATIVE CARE BY SPECIALIST: ICD-10-CM

## 2024-02-07 DIAGNOSIS — C22.1 CHOLANGIOCARCINOMA METASTATIC TO BONE (HCC): Primary | ICD-10-CM

## 2024-02-07 DIAGNOSIS — Z51.5 PALLIATIVE CARE BY SPECIALIST: Primary | ICD-10-CM

## 2024-02-07 DIAGNOSIS — G89.3 PAIN DUE TO NEOPLASM: ICD-10-CM

## 2024-02-07 LAB
ALBUMIN SERPL-MCNC: 2.5 G/DL (ref 3.5–5.2)
ALP SERPL-CCNC: 501 U/L (ref 40–129)
ALT SERPL-CCNC: 54 U/L (ref 0–40)
ANION GAP SERPL CALCULATED.3IONS-SCNC: 12 MMOL/L (ref 7–16)
AST SERPL-CCNC: 55 U/L (ref 0–39)
BASOPHILS # BLD: 0.01 K/UL (ref 0–0.2)
BASOPHILS NFR BLD: 0 % (ref 0–2)
BILIRUB SERPL-MCNC: 0.4 MG/DL (ref 0–1.2)
BNP SERPL-MCNC: 253 PG/ML (ref 0–450)
BUN SERPL-MCNC: 9 MG/DL (ref 6–23)
CALCIUM SERPL-MCNC: 6.9 MG/DL (ref 8.6–10.2)
CHLORIDE SERPL-SCNC: 95 MMOL/L (ref 98–107)
CO2 SERPL-SCNC: 24 MMOL/L (ref 22–29)
CREAT SERPL-MCNC: 0.7 MG/DL (ref 0.7–1.2)
EKG ATRIAL RATE: 95 BPM
EKG P AXIS: 48 DEGREES
EKG P-R INTERVAL: 136 MS
EKG Q-T INTERVAL: 350 MS
EKG QRS DURATION: 94 MS
EKG QTC CALCULATION (BAZETT): 439 MS
EKG R AXIS: 40 DEGREES
EKG T AXIS: 53 DEGREES
EKG VENTRICULAR RATE: 95 BPM
EOSINOPHIL # BLD: 0.06 K/UL (ref 0.05–0.5)
EOSINOPHILS RELATIVE PERCENT: 1 % (ref 0–6)
ERYTHROCYTE [DISTWIDTH] IN BLOOD BY AUTOMATED COUNT: 13.6 % (ref 11.5–15)
FERRITIN SERPL-MCNC: 1995 NG/ML
GFR SERPL CREATININE-BSD FRML MDRD: >60 ML/MIN/1.73M2
GLUCOSE SERPL-MCNC: 102 MG/DL (ref 74–99)
HCT VFR BLD AUTO: 25.8 % (ref 37–54)
HGB BLD-MCNC: 8.8 G/DL (ref 12.5–16.5)
IMM GRANULOCYTES # BLD AUTO: 0.11 K/UL (ref 0–0.58)
IMM GRANULOCYTES NFR BLD: 2 % (ref 0–5)
IRON SATN MFR SERPL: 17 % (ref 20–55)
IRON SERPL-MCNC: 27 UG/DL (ref 59–158)
LYMPHOCYTES NFR BLD: 1.43 K/UL (ref 1.5–4)
LYMPHOCYTES RELATIVE PERCENT: 20 % (ref 20–42)
MAGNESIUM SERPL-MCNC: 2.4 MG/DL (ref 1.6–2.6)
MCH RBC QN AUTO: 31.4 PG (ref 26–35)
MCHC RBC AUTO-ENTMCNC: 34.1 G/DL (ref 32–34.5)
MCV RBC AUTO: 92.1 FL (ref 80–99.9)
MONOCYTES NFR BLD: 0.42 K/UL (ref 0.1–0.95)
MONOCYTES NFR BLD: 6 % (ref 2–12)
NEUTROPHILS NFR BLD: 72 % (ref 43–80)
NEUTS SEG NFR BLD: 5.11 K/UL (ref 1.8–7.3)
PHOSPHATE SERPL-MCNC: 1.8 MG/DL (ref 2.5–4.5)
PLATELET # BLD AUTO: 102 K/UL (ref 130–450)
PMV BLD AUTO: 9.1 FL (ref 7–12)
POTASSIUM SERPL-SCNC: 3.9 MMOL/L (ref 3.5–5)
PROT SERPL-MCNC: 5.7 G/DL (ref 6.4–8.3)
RBC # BLD AUTO: 2.8 M/UL (ref 3.8–5.8)
SODIUM SERPL-SCNC: 131 MMOL/L (ref 132–146)
TIBC SERPL-MCNC: 160 UG/DL (ref 250–450)
TROPONIN I SERPL HS-MCNC: 22 NG/L (ref 0–11)
TROPONIN I SERPL HS-MCNC: 26 NG/L (ref 0–11)
WBC OTHER # BLD: 7.1 K/UL (ref 4.5–11.5)

## 2024-02-07 PROCEDURE — 96415 CHEMO IV INFUSION ADDL HR: CPT

## 2024-02-07 PROCEDURE — 96367 TX/PROPH/DG ADDL SEQ IV INF: CPT

## 2024-02-07 PROCEDURE — 6360000002 HC RX W HCPCS: Performed by: INTERNAL MEDICINE

## 2024-02-07 PROCEDURE — 96413 CHEMO IV INFUSION 1 HR: CPT

## 2024-02-07 PROCEDURE — 96375 TX/PRO/DX INJ NEW DRUG ADDON: CPT

## 2024-02-07 PROCEDURE — 96374 THER/PROPH/DIAG INJ IV PUSH: CPT

## 2024-02-07 PROCEDURE — 96417 CHEMO IV INFUS EACH ADDL SEQ: CPT

## 2024-02-07 PROCEDURE — 80053 COMPREHEN METABOLIC PANEL: CPT

## 2024-02-07 PROCEDURE — 2580000003 HC RX 258: Performed by: INTERNAL MEDICINE

## 2024-02-07 PROCEDURE — 71275 CT ANGIOGRAPHY CHEST: CPT

## 2024-02-07 PROCEDURE — 93010 ELECTROCARDIOGRAM REPORT: CPT | Performed by: INTERNAL MEDICINE

## 2024-02-07 PROCEDURE — 83735 ASSAY OF MAGNESIUM: CPT

## 2024-02-07 PROCEDURE — 82728 ASSAY OF FERRITIN: CPT

## 2024-02-07 PROCEDURE — 84100 ASSAY OF PHOSPHORUS: CPT

## 2024-02-07 PROCEDURE — 83880 ASSAY OF NATRIURETIC PEPTIDE: CPT

## 2024-02-07 PROCEDURE — 6360000002 HC RX W HCPCS: Performed by: EMERGENCY MEDICINE

## 2024-02-07 PROCEDURE — 93005 ELECTROCARDIOGRAM TRACING: CPT | Performed by: EMERGENCY MEDICINE

## 2024-02-07 PROCEDURE — 6360000004 HC RX CONTRAST MEDICATION: Performed by: RADIOLOGY

## 2024-02-07 PROCEDURE — 85025 COMPLETE CBC W/AUTO DIFF WBC: CPT

## 2024-02-07 PROCEDURE — 83540 ASSAY OF IRON: CPT

## 2024-02-07 PROCEDURE — 83550 IRON BINDING TEST: CPT

## 2024-02-07 PROCEDURE — 84484 ASSAY OF TROPONIN QUANT: CPT

## 2024-02-07 RX ORDER — DEXAMETHASONE SODIUM PHOSPHATE 10 MG/ML
10 INJECTION INTRAMUSCULAR; INTRAVENOUS ONCE
Status: COMPLETED | OUTPATIENT
Start: 2024-02-07 | End: 2024-02-07

## 2024-02-07 RX ORDER — SODIUM CHLORIDE 9 MG/ML
INJECTION, SOLUTION INTRAVENOUS CONTINUOUS
Status: CANCELLED | OUTPATIENT
Start: 2024-02-07

## 2024-02-07 RX ORDER — KETOROLAC TROMETHAMINE 15 MG/ML
15 INJECTION, SOLUTION INTRAMUSCULAR; INTRAVENOUS ONCE
Status: COMPLETED | OUTPATIENT
Start: 2024-02-07 | End: 2024-02-07

## 2024-02-07 RX ORDER — DIPHENHYDRAMINE HYDROCHLORIDE 50 MG/ML
50 INJECTION INTRAMUSCULAR; INTRAVENOUS
Status: CANCELLED | OUTPATIENT
Start: 2024-02-07

## 2024-02-07 RX ORDER — SODIUM CHLORIDE 9 MG/ML
5-250 INJECTION, SOLUTION INTRAVENOUS PRN
Status: DISCONTINUED | OUTPATIENT
Start: 2024-02-07 | End: 2024-02-08 | Stop reason: HOSPADM

## 2024-02-07 RX ORDER — PALONOSETRON HYDROCHLORIDE 0.05 MG/ML
0.25 INJECTION, SOLUTION INTRAVENOUS ONCE
Status: COMPLETED | OUTPATIENT
Start: 2024-02-07 | End: 2024-02-07

## 2024-02-07 RX ORDER — ACETAMINOPHEN 325 MG/1
650 TABLET ORAL
Status: CANCELLED | OUTPATIENT
Start: 2024-02-07

## 2024-02-07 RX ORDER — ONDANSETRON 2 MG/ML
8 INJECTION INTRAMUSCULAR; INTRAVENOUS
Status: CANCELLED | OUTPATIENT
Start: 2024-02-07

## 2024-02-07 RX ORDER — TRAMADOL HYDROCHLORIDE 50 MG/1
50 TABLET ORAL EVERY 6 HOURS PRN
Qty: 28 TABLET | Refills: 0 | Status: SHIPPED | OUTPATIENT
Start: 2024-02-07 | End: 2024-02-14

## 2024-02-07 RX ORDER — SODIUM CHLORIDE 0.9 % (FLUSH) 0.9 %
5-40 SYRINGE (ML) INJECTION PRN
Status: DISCONTINUED | OUTPATIENT
Start: 2024-02-07 | End: 2024-02-08 | Stop reason: HOSPADM

## 2024-02-07 RX ORDER — EPINEPHRINE 1 MG/ML
0.3 INJECTION, SOLUTION, CONCENTRATE INTRAVENOUS PRN
Status: CANCELLED | OUTPATIENT
Start: 2024-02-07

## 2024-02-07 RX ORDER — HEPARIN 100 UNIT/ML
500 SYRINGE INTRAVENOUS PRN
Status: DISCONTINUED | OUTPATIENT
Start: 2024-02-07 | End: 2024-02-08 | Stop reason: HOSPADM

## 2024-02-07 RX ADMIN — Medication 500 UNITS: at 14:44

## 2024-02-07 RX ADMIN — PALONOSETRON 0.25 MG: 0.25 INJECTION, SOLUTION INTRAVENOUS at 10:07

## 2024-02-07 RX ADMIN — FOSAPREPITANT 150 MG: 150 INJECTION, POWDER, LYOPHILIZED, FOR SOLUTION INTRAVENOUS at 10:42

## 2024-02-07 RX ADMIN — KETOROLAC TROMETHAMINE 15 MG: 15 INJECTION, SOLUTION INTRAMUSCULAR; INTRAVENOUS at 01:17

## 2024-02-07 RX ADMIN — DEXAMETHASONE SODIUM PHOSPHATE 10 MG: 10 INJECTION INTRAMUSCULAR; INTRAVENOUS at 10:07

## 2024-02-07 RX ADMIN — SODIUM CHLORIDE 150 ML/HR: 9 INJECTION, SOLUTION INTRAVENOUS at 10:07

## 2024-02-07 RX ADMIN — GEMCITABINE 1800 MG: 38 INJECTION, SOLUTION INTRAVENOUS at 11:12

## 2024-02-07 RX ADMIN — SODIUM CHLORIDE, PRESERVATIVE FREE 10 ML: 5 INJECTION INTRAVENOUS at 10:06

## 2024-02-07 RX ADMIN — IOPAMIDOL 80 ML: 755 INJECTION, SOLUTION INTRAVENOUS at 01:02

## 2024-02-07 RX ADMIN — POTASSIUM CHLORIDE: 2 INJECTION, SOLUTION, CONCENTRATE INTRAVENOUS at 12:15

## 2024-02-07 ASSESSMENT — PAIN SCALES - GENERAL
PAINLEVEL_OUTOF10: 3
PAINLEVEL_OUTOF10: 4

## 2024-02-07 ASSESSMENT — PAIN DESCRIPTION - LOCATION: LOCATION: ARM;NECK

## 2024-02-07 ASSESSMENT — LIFESTYLE VARIABLES: HOW MANY STANDARD DRINKS CONTAINING ALCOHOL DO YOU HAVE ON A TYPICAL DAY: PATIENT DOES NOT DRINK

## 2024-02-07 NOTE — TELEPHONE ENCOUNTER
SW briefly met with pt and daughter prior to their visit with Dr. Davis.  Pt denied any needs at this time.  Pt was encouraged to reach out to this provider should any needs arise.        Elver CRUZ, THOMAS-S  Oncology Social Worker

## 2024-02-07 NOTE — TELEPHONE ENCOUNTER
At the request of Dr. Davis this nurse spoke with Wilmer and his daughter Anat in the infusion center. Patient has new compression fracture and is complaining of increased pain in neck and shoulders. Patient is wearing a soft collar. Discussed with Prasanth Valenzuela CNP and he will send to [facility] on [address] a prescription for Tramadol 50 mg. Next emerson 2/21/24.

## 2024-02-07 NOTE — ED NOTES
Pt has no needs at this time. Urinal emptied and placed back at bedside and new warm blanket given to pt for comfort.

## 2024-02-07 NOTE — PROGRESS NOTES
[facility] called.  Spoke with Bekah and gave her DR ELOY Davis's order:  Do Not Drain Pleurex Catheter this week.    
discharge    1/15/2024  Continues with excellent performance status  Bone biopsy pathology showed the following  Bone lesion, biopsy: Metastatic adenocarcinoma, see comment.     Comment:   Immunostains stain the carcinoma cells as follows:   CK7: Positive   CDX2: Positive   CK20: Negative   TTF-1: Negative   NKX and PSA: Negative   The immunoprofile is not specific, but could suggest an upper GI or   pancreaticobiliary primary site.  Prostate origin is unlikely, given   the negative NKX and PSA stains.  Clinical and radiologic correlation   is needed.     Intradepartmental consultation is obtained.     Patient's prior positive pleural fluid showing adenocarcinoma      His CT scan of abdomen and pelvis done recently showed normal liver spleen and pancreas.  Bowel pattern was unremarkable without any suspicious bowel wall thickening.  There was no evidence of intra-abdominal lymphadenopathy or ascites.  Only multiple mixed lytic and sclerotic lesions were noted involving bilateral femurs pubic bones iliac bones and spine.    Clinically his presentation fits best lung cancer with his massive malignant left-sided pleural effusion requiring CT drainage with conversion into Pleurx catheter.    His cellblock to be sent for tissue of origin assay  PET CT scan to be done    NGS on liquid with reflex to cellblock will also be done with consideration for treatment after PET/CT reviewed.    Patient is being followed by home care nurse agency to drain his Pleurx catheter and on Friday he had 250 cc of serosanguineous fluid removed and it is being done 3 times per week.    Patient is a Jehovah witness and is not to receive any form of blood transfusion but is agreeable to RICHAR when needed    He is requesting palliative care consult.    1/29/2024  Patient continues to have significant drainage from his Pleurx catheter.  His PET CT scan showed the following:    Extensive osseous hypermetabolic metastatic disease involving the

## 2024-02-07 NOTE — ED PROVIDER NOTES
visit from 1/31/2024 reviewed.  Patient was seen by oncology.  \"A/P: Large left pleural effusion scheduled for ultrasound-guided thoracentesis.  Pleural fluid was exudative with high total proteins and markedly elevated LDH consistent with malignant effusion. 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  The patient will be admitted for further treatment and evaluation for   1. Exertional dyspnea    2. Recurrent left pleural effusion    3. History of cancer    4. On antineoplastic chemotherapy    5. Anemia, unspecified type    6. Elevated LFTs          Re-Evaluations/Consultations:             ED Course as of 02/07/24 0505   Wed Feb 07, 2024   0104 CBC with Auto Differential(!):    WBC 7.1   RBC 2.80(!)   Hemoglobin Quant 8.8(!)   Hematocrit 25.8(!)   MCV 92.1   MCH 31.4   MCHC 34.1   RDW 13.6   Platelet Count 102(!)   MPV 9.1   Neutrophils % 72   Lymphocyte % 20   Monocytes % 6   Eosinophils % 1   Basophils % 0   Immature Granulocytes 2   Neutrophils Absolute 5.11   Lymphocytes Absolute 1.43(!)   Monocytes Absolute 0.42   Eosinophils Absolute 0.06   Basophils Absolute 0.01   Absolute Immature Granulocyte 0.11  Hemoglobin 8.8 which is a precipitous drop compared to labs 5 days ago where hemoglobin was 11.2. [PP]   0104 CMP(!):    Sodium 131(!)   Potassium 3.9   Chloride 95(!)   CO2 24   Anion Gap 12   Glucose, Random 102(!)   BUN,BUNPL 9   Creatinine 0.7   Est, Glom Filt Rate >60   CALCIUM,  SERUM, 190829 6.9(!)   Total Protein 5.7(!)   Albumin 2.5(!)   BILIRUBIN TOTAL 0.4   Alk Phos 501(!)   ALT 54(!)   AST 55(!)  Elevated LFTs.  Alk phos has been elevated in the 600 range on previous labs.  LFT and ALT slightly elevated compared to previous. [PP]   0104 Troponin, High Sensitivity(!): 26 [PP]   0104 Pro-BNP: 253 [PP]   0104 Magnesium: 2.4 [PP]   0104 Phosphorus(!): 1.8 [PP]   0152 Troponin, High Sensitivity(!): 22  Initial 26, delta 4. [PP]   0157 I discussed lab results with patient and addressed the drop in his hemoglobin compared to 5 days ago.  Patient denies any active bleeding.  He has no melena, hematochezia, hematuria, hematemesis, hemoptysis, epistaxis or hematuria.  Patient is not on a blood thinner.     Patient does state that he has a chest tube on his left side present.  Patient has thoracentesis 3 times a week.  He does state that the pleural fluid is sometimes bloody.    Perhaps this is contributing to the drop in his hemoglobin. [PP]   0503 I spoke with patient about admission and the likelihood that he would not get chemotherapy if you were to be admitted.  Patient would really like to attend his chemotherapy session this morning.  He states that he is comfortable with being discharged in order to get his chemotherapy and he will follow-up with his PCP and specialist as an outpatient for further evaluation of his current symptoms.    Again patient was not hypoxic at rest or on ambulation in the ED.    He is comfortable with discharge and that will be the plan after shared decision making.    I did discuss return precautions including but not limited to worsening shortness of breath, chest pain, lightheadedness, dizziness or syncope.    I also called his wife Hallie to notify her of the change in plan.  She is agreeable with this plan as well and will come to pick the patient up after discharge. [PP]      ED Course User Index  [PP] Kerrie Acuña, DO         This patient's ED course

## 2024-02-07 NOTE — TELEPHONE ENCOUNTER
Wilmer Fuentes  2/7/2024  Ht Readings from Last 1 Encounters:   02/07/24 1.778 m (5' 10\")     Wt Readings from Last 10 Encounters:   02/07/24 84.8 kg (187 lb)   02/02/24 83.9 kg (185 lb)   01/31/24 83 kg (183 lb)   01/31/24 83 kg (183 lb)   01/29/24 83.5 kg (184 lb 1.6 oz)   01/15/24 87.1 kg (192 lb)   01/11/24 83.8 kg (184 lb 12.8 oz)   06/22/23 88.9 kg (196 lb)   04/12/22 88.5 kg (195 lb)   05/24/19 88.5 kg (195 lb)     BMI=26.83    Assessment: Met with Wilmer during his infusion today for cholangiocarcinoma with mets. Day 8 Cycle 2 cisplatin/durvalumab/gemcitabine. He reports his appetite is a little better. Still eating small amounts but eating 4 times per day and drinking ONS. High calories shakes made with ONS being used to maximize calories and flavor. Denied N/V/D/C. 1.19% weight gain x 1 month, 8.78% significant weight loss within 3 months. Encouraged him to continue to eat small frequent meals and drink ONS to help meet his energy/protein needs. No nutritional questions voiced at this time. Encouraged him to call with questions or concerns.     Renita Morelos RD

## 2024-02-09 ENCOUNTER — HOSPITAL ENCOUNTER (OUTPATIENT)
Dept: INFUSION THERAPY | Age: 79
Discharge: HOME | End: 2024-02-09
Payer: MEDICARE

## 2024-02-09 DIAGNOSIS — D50.0 IRON DEFICIENCY ANEMIA DUE TO CHRONIC BLOOD LOSS: ICD-10-CM

## 2024-02-09 DIAGNOSIS — C22.1 CHOLANGIOCARCINOMA METASTATIC TO BONE (HCC): Primary | ICD-10-CM

## 2024-02-09 DIAGNOSIS — D64.81 ANTINEOPLASTIC CHEMOTHERAPY INDUCED ANEMIA: ICD-10-CM

## 2024-02-09 DIAGNOSIS — C79.51 CHOLANGIOCARCINOMA METASTATIC TO BONE (HCC): Primary | ICD-10-CM

## 2024-02-09 DIAGNOSIS — T45.1X5A ANTINEOPLASTIC CHEMOTHERAPY INDUCED ANEMIA: ICD-10-CM

## 2024-02-09 PROCEDURE — 96372 THER/PROPH/DIAG INJ SC/IM: CPT

## 2024-02-09 PROCEDURE — 6360000002 HC RX W HCPCS: Performed by: NURSE PRACTITIONER

## 2024-02-09 RX ORDER — EPINEPHRINE 1 MG/ML
0.3 INJECTION, SOLUTION, CONCENTRATE INTRAVENOUS PRN
OUTPATIENT
Start: 2024-02-14

## 2024-02-09 RX ORDER — DIPHENHYDRAMINE HYDROCHLORIDE 50 MG/ML
50 INJECTION INTRAMUSCULAR; INTRAVENOUS
OUTPATIENT
Start: 2024-02-14

## 2024-02-09 RX ORDER — SODIUM CHLORIDE 9 MG/ML
INJECTION, SOLUTION INTRAVENOUS CONTINUOUS
OUTPATIENT
Start: 2024-02-14

## 2024-02-09 RX ORDER — ACETAMINOPHEN 325 MG/1
650 TABLET ORAL
OUTPATIENT
Start: 2024-02-14

## 2024-02-09 RX ORDER — ONDANSETRON 2 MG/ML
8 INJECTION INTRAMUSCULAR; INTRAVENOUS
OUTPATIENT
Start: 2024-02-14

## 2024-02-09 RX ORDER — FAMOTIDINE 10 MG/ML
20 INJECTION, SOLUTION INTRAVENOUS
OUTPATIENT
Start: 2024-02-14

## 2024-02-09 RX ADMIN — DARBEPOETIN ALFA 200 MCG: 200 INJECTION, SOLUTION INTRAVENOUS; SUBCUTANEOUS at 12:29

## 2024-02-13 LAB
MICROORGANISM SPEC CULT: NORMAL
MICROORGANISM/AGENT SPEC: NORMAL
SPECIMEN DESCRIPTION: NORMAL

## 2024-02-14 ENCOUNTER — OFFICE VISIT (OUTPATIENT)
Dept: ONCOLOGY | Age: 79
End: 2024-02-14
Payer: MEDICARE

## 2024-02-14 ENCOUNTER — HOSPITAL ENCOUNTER (OUTPATIENT)
Dept: INFUSION THERAPY | Age: 79
Discharge: HOME | End: 2024-02-14
Payer: MEDICARE

## 2024-02-14 ENCOUNTER — HOSPITAL ENCOUNTER (OUTPATIENT)
Dept: ULTRASOUND IMAGING | Age: 79
Discharge: HOME | End: 2024-02-14
Payer: MEDICARE

## 2024-02-14 VITALS
SYSTOLIC BLOOD PRESSURE: 152 MMHG | WEIGHT: 191.8 LBS | DIASTOLIC BLOOD PRESSURE: 81 MMHG | TEMPERATURE: 101.2 F | HEIGHT: 70 IN | BODY MASS INDEX: 27.46 KG/M2 | OXYGEN SATURATION: 99 % | HEART RATE: 107 BPM

## 2024-02-14 DIAGNOSIS — R60.9 EDEMA, UNSPECIFIED TYPE: ICD-10-CM

## 2024-02-14 DIAGNOSIS — C79.51 CHOLANGIOCARCINOMA METASTATIC TO BONE (HCC): Primary | ICD-10-CM

## 2024-02-14 DIAGNOSIS — D50.0 IRON DEFICIENCY ANEMIA DUE TO CHRONIC BLOOD LOSS: ICD-10-CM

## 2024-02-14 DIAGNOSIS — M79.651 PAIN IN RIGHT THIGH: ICD-10-CM

## 2024-02-14 DIAGNOSIS — M79.651 PAIN IN RIGHT THIGH: Primary | ICD-10-CM

## 2024-02-14 DIAGNOSIS — C22.1 CHOLANGIOCARCINOMA METASTATIC TO BONE (HCC): Primary | ICD-10-CM

## 2024-02-14 LAB
ALBUMIN SERPL-MCNC: 3 G/DL (ref 3.5–5.2)
ALP SERPL-CCNC: 454 U/L (ref 40–129)
ALT SERPL-CCNC: 25 U/L (ref 0–40)
ANION GAP SERPL CALCULATED.3IONS-SCNC: 11 MMOL/L (ref 7–16)
AST SERPL-CCNC: 30 U/L (ref 0–39)
BASOPHILS # BLD: 0.01 K/UL (ref 0–0.2)
BASOPHILS NFR BLD: 0 % (ref 0–2)
BILIRUB SERPL-MCNC: 0.6 MG/DL (ref 0–1.2)
BUN SERPL-MCNC: 10 MG/DL (ref 6–23)
CALCIUM SERPL-MCNC: 7.9 MG/DL (ref 8.6–10.2)
CHLORIDE SERPL-SCNC: 92 MMOL/L (ref 98–107)
CO2 SERPL-SCNC: 25 MMOL/L (ref 22–29)
CREAT SERPL-MCNC: 0.7 MG/DL (ref 0.7–1.2)
EOSINOPHIL # BLD: 0.02 K/UL (ref 0.05–0.5)
EOSINOPHILS RELATIVE PERCENT: 0 % (ref 0–6)
ERYTHROCYTE [DISTWIDTH] IN BLOOD BY AUTOMATED COUNT: 13.9 % (ref 11.5–15)
FERRITIN SERPL-MCNC: 1647 NG/ML
GFR SERPL CREATININE-BSD FRML MDRD: >60 ML/MIN/1.73M2
GLUCOSE SERPL-MCNC: 101 MG/DL (ref 74–99)
HCT VFR BLD AUTO: 27.2 % (ref 37–54)
HGB BLD-MCNC: 9.2 G/DL (ref 12.5–16.5)
IRON SATN MFR SERPL: 22 % (ref 20–55)
IRON SERPL-MCNC: 36 UG/DL (ref 59–158)
LYMPHOCYTES NFR BLD: 0.96 K/UL (ref 1.5–4)
LYMPHOCYTES RELATIVE PERCENT: 18 % (ref 20–42)
MCH RBC QN AUTO: 30.4 PG (ref 26–35)
MCHC RBC AUTO-ENTMCNC: 33.8 G/DL (ref 32–34.5)
MCV RBC AUTO: 89.8 FL (ref 80–99.9)
MONOCYTES NFR BLD: 0.35 K/UL (ref 0.1–0.95)
MONOCYTES NFR BLD: 6 % (ref 2–12)
NEUTROPHILS NFR BLD: 74 % (ref 43–80)
NEUTS SEG NFR BLD: 4.04 K/UL (ref 1.8–7.3)
PLATELET # BLD AUTO: 52 K/UL (ref 130–450)
PLATELET CONFIRMATION: NORMAL
PMV BLD AUTO: 10 FL (ref 7–12)
POTASSIUM SERPL-SCNC: 3.3 MMOL/L (ref 3.5–5)
PROT SERPL-MCNC: 6.5 G/DL (ref 6.4–8.3)
RBC # BLD AUTO: 3.03 M/UL (ref 3.8–5.8)
RBC # BLD: ABNORMAL 10*6/UL
SODIUM SERPL-SCNC: 128 MMOL/L (ref 132–146)
TIBC SERPL-MCNC: 162 UG/DL (ref 250–450)
WBC OTHER # BLD: 5.5 K/UL (ref 4.5–11.5)

## 2024-02-14 PROCEDURE — 83540 ASSAY OF IRON: CPT

## 2024-02-14 PROCEDURE — 85025 COMPLETE CBC W/AUTO DIFF WBC: CPT

## 2024-02-14 PROCEDURE — 82728 ASSAY OF FERRITIN: CPT

## 2024-02-14 PROCEDURE — 36415 COLL VENOUS BLD VENIPUNCTURE: CPT

## 2024-02-14 PROCEDURE — 99213 OFFICE O/P EST LOW 20 MIN: CPT

## 2024-02-14 PROCEDURE — 83550 IRON BINDING TEST: CPT

## 2024-02-14 PROCEDURE — 80053 COMPREHEN METABOLIC PANEL: CPT

## 2024-02-14 PROCEDURE — 93971 EXTREMITY STUDY: CPT

## 2024-02-14 RX ORDER — POTASSIUM CHLORIDE 750 MG/1
10 TABLET, EXTENDED RELEASE ORAL DAILY
Qty: 15 TABLET | Refills: 0 | Status: SHIPPED | OUTPATIENT
Start: 2024-02-14 | End: 2024-02-29

## 2024-02-14 NOTE — PROGRESS NOTES
acute process.     Stable appearance of the chest compared to 12/29/2023.     Echo (TTE) limited (PRN contrast/bubble/strain/3D)    Result Date: 12/29/2023    Left Ventricle: The EF by visual approximation is 60 - 65%.   Right Ventricle: Right ventricle size is normal. Normal systolic function.   Mitral Valve: Trace regurgitation.   Image quality is fair.     NM BONE SCAN WHOLE BODY    Result Date: 12/29/2023  EXAMINATION: WHOLE BODY BONE SCAN  12/29/2023 TECHNIQUE: The patient was injected intravenously with 29.4 CT pulmonary angiogram from today.  MCi of 99 mTc MDP and scintigraphy of the entire skeleton was performed approximately three hours later. COMPARISON: Patient did not have previous imaging studies for comparison. HISTORY: ORDERING SYSTEM PROVIDED HISTORY: cancer TECHNOLOGIST PROVIDED HISTORY: Reason for exam:->cancer What reading provider will be dictating this exam?->CRC FINDINGS: There are several areas of focal increased activity scattered throughout the axial and appendicular skeleton worrisome for metastatic disease. Prominently increased activity is seen in the posterior right parietal calvarium suspicious for a metastatic lesion. There is increased activity in the area of the manubrium, suspicious for metastatic disease. There is prominently increased activity in the thoracolumbar region, corresponding to the area of sclerosis at T11 and T12, consistent with metastatic disease.  Smaller areas of increased activity are present at the right upper and left mid thoracic spine consistent with metastatic disease. Multiple areas of increased activity are present in the posteromedial left iliac wing as well as in the anterior superior right lateral iliac wing and posterosuperior left iliac wing. There is a large area of increased activity in the intertrochanteric region of the right hip with a smaller region of activity in the intertrochanteric region of the left hip. Foci of activity in the right

## 2024-02-21 ENCOUNTER — HOSPITAL ENCOUNTER (OUTPATIENT)
Dept: INFUSION THERAPY | Age: 79
Discharge: HOME | End: 2024-02-21
Payer: MEDICARE

## 2024-02-21 ENCOUNTER — OFFICE VISIT (OUTPATIENT)
Dept: ONCOLOGY | Age: 79
End: 2024-02-21
Payer: MEDICARE

## 2024-02-21 ENCOUNTER — TELEPHONE (OUTPATIENT)
Dept: CASE MANAGEMENT | Age: 79
End: 2024-02-21

## 2024-02-21 ENCOUNTER — OFFICE VISIT (OUTPATIENT)
Dept: PALLATIVE CARE | Age: 79
End: 2024-02-21
Payer: MEDICARE

## 2024-02-21 VITALS
SYSTOLIC BLOOD PRESSURE: 107 MMHG | OXYGEN SATURATION: 94 % | HEART RATE: 86 BPM | DIASTOLIC BLOOD PRESSURE: 71 MMHG | BODY MASS INDEX: 27.79 KG/M2 | TEMPERATURE: 97.8 F | WEIGHT: 194.1 LBS | HEIGHT: 70 IN

## 2024-02-21 DIAGNOSIS — C22.1 CHOLANGIOCARCINOMA METASTATIC TO BONE (HCC): Primary | ICD-10-CM

## 2024-02-21 DIAGNOSIS — C79.51 CHOLANGIOCARCINOMA METASTATIC TO BONE (HCC): Primary | ICD-10-CM

## 2024-02-21 DIAGNOSIS — C79.51 CHOLANGIOCARCINOMA METASTATIC TO BONE (HCC): ICD-10-CM

## 2024-02-21 DIAGNOSIS — C22.1 CHOLANGIOCARCINOMA METASTATIC TO BONE (HCC): ICD-10-CM

## 2024-02-21 LAB
ALBUMIN SERPL-MCNC: 2.5 G/DL (ref 3.5–5.2)
ALP SERPL-CCNC: 460 U/L (ref 40–129)
ALT SERPL-CCNC: 16 U/L (ref 0–40)
ANION GAP SERPL CALCULATED.3IONS-SCNC: 11 MMOL/L (ref 7–16)
AST SERPL-CCNC: 30 U/L (ref 0–39)
BASOPHILS # BLD: 0 K/UL (ref 0–0.2)
BASOPHILS NFR BLD: 0 % (ref 0–2)
BILIRUB SERPL-MCNC: 0.6 MG/DL (ref 0–1.2)
BUN SERPL-MCNC: 14 MG/DL (ref 6–23)
CALCIUM SERPL-MCNC: 7.2 MG/DL (ref 8.6–10.2)
CHLORIDE SERPL-SCNC: 90 MMOL/L (ref 98–107)
CO2 SERPL-SCNC: 24 MMOL/L (ref 22–29)
CREAT SERPL-MCNC: 0.8 MG/DL (ref 0.7–1.2)
EOSINOPHIL # BLD: 0 K/UL (ref 0.05–0.5)
EOSINOPHILS RELATIVE PERCENT: 0 % (ref 0–6)
ERYTHROCYTE [DISTWIDTH] IN BLOOD BY AUTOMATED COUNT: 15.5 % (ref 11.5–15)
GFR SERPL CREATININE-BSD FRML MDRD: >60 ML/MIN/1.73M2
GLUCOSE SERPL-MCNC: 127 MG/DL (ref 74–99)
HCT VFR BLD AUTO: 25.6 % (ref 37–54)
HGB BLD-MCNC: 8.5 G/DL (ref 12.5–16.5)
LYMPHOCYTES NFR BLD: 1.76 K/UL (ref 1.5–4)
LYMPHOCYTES RELATIVE PERCENT: 13 % (ref 20–42)
MCH RBC QN AUTO: 29.9 PG (ref 26–35)
MCHC RBC AUTO-ENTMCNC: 33.2 G/DL (ref 32–34.5)
MCV RBC AUTO: 90.1 FL (ref 80–99.9)
METAMYELOCYTES ABSOLUTE COUNT: 0.41 K/UL (ref 0–0.12)
METAMYELOCYTES: 3 % (ref 0–1)
MONOCYTES NFR BLD: 27 % (ref 2–12)
MONOCYTES NFR BLD: 3.65 K/UL (ref 0.1–0.95)
MYELOCYTES ABSOLUTE COUNT: 0.27 K/UL
MYELOCYTES: 2 %
NEUTROPHILS NFR BLD: 55 % (ref 43–80)
NEUTS SEG NFR BLD: 7.43 K/UL (ref 1.8–7.3)
NUCLEATED RED BLOOD CELLS: 2 PER 100 WBC
OSMOLALITY UR: 499 MOSM/KG (ref 300–900)
PLATELET # BLD AUTO: 432 K/UL (ref 130–450)
PMV BLD AUTO: 9.5 FL (ref 7–12)
POTASSIUM SERPL-SCNC: 4.3 MMOL/L (ref 3.5–5)
PROT SERPL-MCNC: 5.8 G/DL (ref 6.4–8.3)
RBC # BLD AUTO: 2.84 M/UL (ref 3.8–5.8)
RBC # BLD: ABNORMAL 10*6/UL
RBC # BLD: ABNORMAL 10*6/UL
SODIUM SERPL-SCNC: 125 MMOL/L (ref 132–146)
TSH SERPL DL<=0.05 MIU/L-ACNC: 3.53 UIU/ML (ref 0.27–4.2)
WBC OTHER # BLD: 13.5 K/UL (ref 4.5–11.5)

## 2024-02-21 PROCEDURE — G8419 CALC BMI OUT NRM PARAM NOF/U: HCPCS | Performed by: NURSE PRACTITIONER

## 2024-02-21 PROCEDURE — 1036F TOBACCO NON-USER: CPT | Performed by: NURSE PRACTITIONER

## 2024-02-21 PROCEDURE — 1123F ACP DISCUSS/DSCN MKR DOCD: CPT | Performed by: NURSE PRACTITIONER

## 2024-02-21 PROCEDURE — G8427 DOCREV CUR MEDS BY ELIG CLIN: HCPCS | Performed by: NURSE PRACTITIONER

## 2024-02-21 PROCEDURE — 36415 COLL VENOUS BLD VENIPUNCTURE: CPT

## 2024-02-21 PROCEDURE — 80053 COMPREHEN METABOLIC PANEL: CPT

## 2024-02-21 PROCEDURE — 85025 COMPLETE CBC W/AUTO DIFF WBC: CPT

## 2024-02-21 PROCEDURE — G8484 FLU IMMUNIZE NO ADMIN: HCPCS | Performed by: NURSE PRACTITIONER

## 2024-02-21 PROCEDURE — 99212 OFFICE O/P EST SF 10 MIN: CPT | Performed by: NURSE PRACTITIONER

## 2024-02-21 PROCEDURE — 84443 ASSAY THYROID STIM HORMONE: CPT

## 2024-02-21 PROCEDURE — 99211 OFF/OP EST MAY X REQ PHY/QHP: CPT | Performed by: NURSE PRACTITIONER

## 2024-02-21 PROCEDURE — 83935 ASSAY OF URINE OSMOLALITY: CPT

## 2024-02-21 PROCEDURE — 83735 ASSAY OF MAGNESIUM: CPT

## 2024-02-21 PROCEDURE — 99212 OFFICE O/P EST SF 10 MIN: CPT

## 2024-02-21 RX ORDER — MIRTAZAPINE 15 MG/1
15 TABLET, FILM COATED ORAL NIGHTLY
Qty: 30 TABLET | Refills: 3 | Status: SHIPPED | OUTPATIENT
Start: 2024-02-21

## 2024-02-21 RX ORDER — BUDESONIDE AND FORMOTEROL FUMARATE DIHYDRATE 160; 4.5 UG/1; UG/1
AEROSOL RESPIRATORY (INHALATION)
COMMUNITY
Start: 2023-12-21

## 2024-02-21 RX ORDER — DEMECLOCYCLINE HYDROCHLORIDE 150 MG/1
150 TABLET, FILM COATED ORAL 2 TIMES DAILY
Qty: 28 TABLET | Refills: 0 | Status: SHIPPED | OUTPATIENT
Start: 2024-02-21 | End: 2024-03-06

## 2024-02-21 RX ORDER — ALBUTEROL SULFATE 90 UG/1
AEROSOL, METERED RESPIRATORY (INHALATION)
COMMUNITY
Start: 2023-12-26

## 2024-02-21 RX ORDER — TRAMADOL HYDROCHLORIDE 50 MG/1
50 TABLET ORAL EVERY 6 HOURS PRN
Qty: 120 TABLET | Refills: 0 | Status: SHIPPED | OUTPATIENT
Start: 2024-02-21 | End: 2024-03-22

## 2024-02-21 RX ORDER — IBUPROFEN 800 MG/1
800 TABLET ORAL EVERY 8 HOURS PRN
COMMUNITY
Start: 2023-11-27

## 2024-02-21 NOTE — PROGRESS NOTES
Palliative Care Department  Provider: MERLENE King - CNP    Referring Provider: Dr. Davis    Location of Service:   [facility]    Chief Complaint: Wilmer Fuentes is a 78 y.o. 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 Davis   -  Diagnosed in Jan. 2024   -  s/p pleurex catheter placement   -  to start treatment with Gemcitabine/Cisplatin and Durvalumab    Pain related to Neoplasm:   -  Ibuprofen 400 mg TID PRN   -  tramadol 50 mg Q6H PRN    Poor appetite:   -  Mirtazapine 15 mg nightly     Follow Up:  4 weeks.  They were encouraged to call with any questions, concerns, needs, or changes in symptoms.    Subjective:     HPI:  Wilmer Fuentes is a 78 y.o. male diagnosed in January 2024 with metastatic cholangiocarcinoma, possibly with secondary cancer, with metastasis to his bones, as well as malignant pleural effusion.  He is following with Dr. Davis, and is to start treatment.  He was referred to [facility] symptomatic management.    Subjective/Events/Discussions:  Chart reviewed.  Patient seen in outpatient clinic accompanied by his daughter and spouse.  Patient complains of right shoulder, right wrist, neck.  He wears a soft neck brace for comfort. He describes his neck pain a sharp and his right shoulder & wrist pain as an ache. He states that the tramadol has been beneficial in reducing his pain. He has been taking it 2-4 times a day. Encouraged to utilize pain medication up to 4 times a day if needed for pain. He also has been using ibuprofen PRN. He continues to have a poor appetite and his spouse has to encourage him to eat. He is taking Remeron 15mg nightly and consumes ONS. He denies nausea, vomiting, diarrhea, or constipation. Support provided. He will follow up in 3-4

## 2024-02-21 NOTE — PROGRESS NOTES
any suspicious bowel wall thickening.  There was no evidence of intra-abdominal lymphadenopathy or ascites.  Only multiple mixed lytic and sclerotic lesions were noted involving bilateral femurs pubic bones iliac bones and spine.    Clinically his presentation fits best lung cancer with his massive malignant left-sided pleural effusion requiring CT drainage with conversion into Pleurx catheter.    His cellblock to be sent for tissue of origin assay  PET CT scan to be done    NGS on liquid with reflex to cellblock will also be done with consideration for treatment after PET/CT reviewed.    Patient is being followed by home care nurse agency to drain his Pleurx catheter and on Friday he had 250 cc of serosanguineous fluid removed and it is being done 3 times per week.    Patient is a Jehovah witness and is not to receive any form of blood transfusion but is agreeable to [facility] when needed    He is requesting palliative care consult.    1/29/2024  Patient continues to have significant drainage from his Pleurx catheter.  His PET CT scan showed the following:    Extensive osseous hypermetabolic metastatic disease involving the cervical,  thoracic and lumbar spine, the pelvis, the multiple ribs, scapulae and  proximal femurs.     Focal hypermetabolic activity in dense left lower lobe airspace disease may  represent obscured pulmonary mass.  The curvilinear increased metabolic  activity within the left pleura the is also worrisome for metastasis.     There is hypermetabolism within the inferior pancreatic head which may  represent a primary or secondary focus of malignancy.     Central right hepatic lobe hypermetabolic focus is also likely metastatic.    He is cancer type ID was consistent with pancreaticobiliary origin with a 90% probability and also suspicious for gallbladder adenocarcinoma.      In summary he likely has cholangiocarcinoma versus gallbladder carcinoma/pancreaticobiliary and his best treatment plan would be

## 2024-02-21 NOTE — TELEPHONE ENCOUNTER
Met with patient, his wife and daughter during his office visit with Dr. Davis. His chemo is on hold for 1 week due to blood counts, but he will receive his Aranesp as scheduled on 02/23/2024. Patient informed there'll be urine testing today and additional blood work orders were received to be done off of his lab work drawn this morning. He was informed his fluid intake is restricted to 40 oz/day at this time and encouraged to continue eating throughout the day to prevent weight loss and drink his protein shakes. Will update the clinic dietitian asking to contact patient to discuss management plan. Patient was informed a prescription for Demeclocycline was sent to his [facility] he is to take it twice a day for 14 days. Informed patient and his family this nurse navigator will contact the pharmacy to make sure they have it in stock, then update them. Contacted patient's pharmacy and 3 additional pharmacies checking on availability, none of them had it in stock and it would take 1-2 days tl get it in. Contacted [facility] outpatient pharmacy, spoke with Miracle, she said they have enough antibiotic today to cover 10 days, then the patient's [facility] will need to fill to complete the other 4 days. She stated she can contact [facility] to adjust the prescription and patient copay is $0.00. Informed her Dr. Davis and the family will be updated and she'll receive a call back with a decision, she verbalized understanding. Dr. Davis updated [facility] pharmacy is able to fill 10 days starting today, he stated to have it fill there. Called patient's daughter to update, left voice message asking to have patient's wife or herself call clinic at [phone]. Spoke with patient's wife, explained [facility] outpatient pharmacy is able to partially fill the prescription today and the rest will need filled by [facility]. She was agreeable with this plan. Informed the [facility] pharmacist, Miracle,

## 2024-02-22 ENCOUNTER — TELEPHONE (OUTPATIENT)
Dept: INFUSION THERAPY | Age: 79
End: 2024-02-22

## 2024-02-22 DIAGNOSIS — C79.51 CHOLANGIOCARCINOMA METASTATIC TO BONE (HCC): Primary | ICD-10-CM

## 2024-02-22 DIAGNOSIS — C22.1 CHOLANGIOCARCINOMA METASTATIC TO BONE (HCC): Primary | ICD-10-CM

## 2024-02-22 LAB — MAGNESIUM SERPL-MCNC: 2.1 MG/DL (ref 1.6–2.6)

## 2024-02-22 NOTE — TELEPHONE ENCOUNTER
Wilmer Fuentes  2/22/2024  Ht Readings from Last 1 Encounters:   02/21/24 1.778 m (5' 10\")     Wt Readings from Last 10 Encounters:   02/21/24 88 kg (194 lb 1.6 oz)   02/14/24 87 kg (191 lb 12.8 oz)   02/07/24 84.8 kg (187 lb)   02/02/24 83.9 kg (185 lb)   01/31/24 83 kg (183 lb)   01/31/24 83 kg (183 lb)   01/29/24 83.5 kg (184 lb 1.6 oz)   01/15/24 87.1 kg (192 lb)   01/11/24 83.8 kg (184 lb 12.8 oz)   06/22/23 88.9 kg (196 lb)     BMI=27.85    Assessment: Hallie (Wilmer's wife) returned earlier phone call to this writer. Wilmer was ordered a 40oz fluid restriction when he was in to see Dr. Davis on 2/21/24 due to low Na 125. Wilmer being treated for cholangiocarcinoma, chemo being held x 1 week. Hallie reports that she feels his appetite is a little better with the use of Remeron. He continues to eat small frequent meals and drinking 1 Ensure per day (containing 30gm protein). Discussed current fluid restriction and explained what is considered a fluid. Patient currently mainly only liking water to drink. Explained the 40oz per day is equivalent to 5 cups or 1200ml or 1200cc. Patient coming to clinic tomorrow and printed information will be left \"Fluid-Restricted Nutrition Therapy\". Also discussed changing ONS to Boost VHC due to higher in calorie per cc. Will leave samples of Boost VHC. Hallie was appreciative of information provided. Encouraged patient or wife to call with questions or concerns.     Renita Morelos RD

## 2024-02-23 ENCOUNTER — TELEPHONE (OUTPATIENT)
Dept: CASE MANAGEMENT | Age: 79
End: 2024-02-23

## 2024-02-23 ENCOUNTER — HOSPITAL ENCOUNTER (OUTPATIENT)
Dept: INFUSION THERAPY | Age: 79
Discharge: HOME | End: 2024-02-23
Payer: MEDICARE

## 2024-02-23 DIAGNOSIS — D64.81 ANTINEOPLASTIC CHEMOTHERAPY INDUCED ANEMIA: ICD-10-CM

## 2024-02-23 DIAGNOSIS — C79.51 SECONDARY MALIGNANCY OF BONE OF LOWER EXTREMITY (HCC): Primary | ICD-10-CM

## 2024-02-23 DIAGNOSIS — C22.1 CHOLANGIOCARCINOMA METASTATIC TO BONE (HCC): ICD-10-CM

## 2024-02-23 DIAGNOSIS — T45.1X5A ANTINEOPLASTIC CHEMOTHERAPY INDUCED ANEMIA: ICD-10-CM

## 2024-02-23 DIAGNOSIS — D50.0 IRON DEFICIENCY ANEMIA DUE TO CHRONIC BLOOD LOSS: ICD-10-CM

## 2024-02-23 DIAGNOSIS — C79.51 CHOLANGIOCARCINOMA METASTATIC TO BONE (HCC): ICD-10-CM

## 2024-02-23 LAB — OSMOLALITY SERPL: 267 MOSM/KG (ref 285–310)

## 2024-02-23 PROCEDURE — 36415 COLL VENOUS BLD VENIPUNCTURE: CPT

## 2024-02-23 PROCEDURE — 6360000002 HC RX W HCPCS: Performed by: NURSE PRACTITIONER

## 2024-02-23 PROCEDURE — 96372 THER/PROPH/DIAG INJ SC/IM: CPT

## 2024-02-23 PROCEDURE — 83930 ASSAY OF BLOOD OSMOLALITY: CPT

## 2024-02-23 RX ORDER — ZOLEDRONIC ACID 0.04 MG/ML
4 INJECTION, SOLUTION INTRAVENOUS ONCE
OUTPATIENT
Start: 2024-03-06 | End: 2024-02-28

## 2024-02-23 RX ORDER — SODIUM CHLORIDE 9 MG/ML
INJECTION, SOLUTION INTRAVENOUS CONTINUOUS
OUTPATIENT
Start: 2024-03-01

## 2024-02-23 RX ORDER — EPINEPHRINE 1 MG/ML
0.3 INJECTION, SOLUTION, CONCENTRATE INTRAVENOUS PRN
OUTPATIENT
Start: 2024-03-01

## 2024-02-23 RX ORDER — FAMOTIDINE 10 MG/ML
20 INJECTION, SOLUTION INTRAVENOUS
OUTPATIENT
Start: 2024-03-01

## 2024-02-23 RX ORDER — ONDANSETRON 2 MG/ML
8 INJECTION INTRAMUSCULAR; INTRAVENOUS
OUTPATIENT
Start: 2024-03-06

## 2024-02-23 RX ORDER — EPINEPHRINE 1 MG/ML
0.3 INJECTION, SOLUTION, CONCENTRATE INTRAVENOUS PRN
OUTPATIENT
Start: 2024-03-06

## 2024-02-23 RX ORDER — HEPARIN 100 UNIT/ML
500 SYRINGE INTRAVENOUS PRN
OUTPATIENT
Start: 2024-03-06

## 2024-02-23 RX ORDER — ACETAMINOPHEN 325 MG/1
650 TABLET ORAL
OUTPATIENT
Start: 2024-03-01

## 2024-02-23 RX ORDER — SODIUM CHLORIDE 9 MG/ML
INJECTION, SOLUTION INTRAVENOUS CONTINUOUS
OUTPATIENT
Start: 2024-03-06

## 2024-02-23 RX ORDER — MEPERIDINE HYDROCHLORIDE 25 MG/ML
12.5 INJECTION INTRAMUSCULAR; INTRAVENOUS; SUBCUTANEOUS PRN
OUTPATIENT
Start: 2024-03-06

## 2024-02-23 RX ORDER — SODIUM CHLORIDE 9 MG/ML
5-250 INJECTION, SOLUTION INTRAVENOUS PRN
OUTPATIENT
Start: 2024-03-06

## 2024-02-23 RX ORDER — ONDANSETRON 2 MG/ML
8 INJECTION INTRAMUSCULAR; INTRAVENOUS
OUTPATIENT
Start: 2024-03-01

## 2024-02-23 RX ORDER — SODIUM CHLORIDE 0.9 % (FLUSH) 0.9 %
5-40 SYRINGE (ML) INJECTION PRN
OUTPATIENT
Start: 2024-03-06

## 2024-02-23 RX ORDER — FAMOTIDINE 10 MG/ML
20 INJECTION, SOLUTION INTRAVENOUS
OUTPATIENT
Start: 2024-03-06

## 2024-02-23 RX ORDER — DIPHENHYDRAMINE HYDROCHLORIDE 50 MG/ML
50 INJECTION INTRAMUSCULAR; INTRAVENOUS
OUTPATIENT
Start: 2024-03-06

## 2024-02-23 RX ORDER — ALBUTEROL SULFATE 90 UG/1
4 AEROSOL, METERED RESPIRATORY (INHALATION) PRN
OUTPATIENT
Start: 2024-03-06

## 2024-02-23 RX ORDER — DIPHENHYDRAMINE HYDROCHLORIDE 50 MG/ML
50 INJECTION INTRAMUSCULAR; INTRAVENOUS
OUTPATIENT
Start: 2024-03-01

## 2024-02-23 RX ORDER — ACETAMINOPHEN 325 MG/1
650 TABLET ORAL
OUTPATIENT
Start: 2024-03-06

## 2024-02-23 RX ADMIN — DARBEPOETIN ALFA 200 MCG: 200 INJECTION, SOLUTION INTRAVENOUS; SUBCUTANEOUS at 14:09

## 2024-02-23 NOTE — TELEPHONE ENCOUNTER
Met with patient as his wife while he was in the clinic for his Aranesp injection and lab work. Patient will return on 02/28/2024 for an office visit wit Dr. Davis and and lab work to check if he's able to resume his chemotherapy. Reinforced chemo regime with chemotherapy and immunotherapy cycles starting every 21 days. Patient's wife stated they picked up the anti-emetics for home use and patient is taking the Demeclocycline. She said she will be contacting their pharmacy to make sure the rest of the prescription is ready for  when they need it. Patient's wife stated he has been taking his pain medication as ordered for the past couple of days and has had pain relief. Encouraged to continue giving it as ordered or patient comfort. Eating continues to be a challenge for patient, his wife said all he's eaten today is a banana. Patient stated he's not hungry, but is aware it's important to do to keep up his strength. His wife stated she cooks and offers him protein shakes, regular foods, snacks and smoothies. She stated she has him on a grazing schedules since patient's appetite is decreased. Provided them with the Boost shakes as set forth by the clinic dietitian along with fluid restriction information. Reviewed the paperwork with patient's wife. Patient was agreeable with drinking some of the Boost shake while sitting in the clinic waiting for his injection. Encouraged him to drink more of it in a couple of hours and to have some food on his stomach before he takes his medications, if allowed, to prevent an upset stomach, he verbalized understanding. Will update dietitian. Discussed and gave patient the following materials: ACS Bile Duct information and treatment with chemotherapy, ACS What Can I Do to Take Care of Myself During Chemotherapy, ACS Getting Help for Fatigue, [facility] brochure, list of hydration options, Eating and Drinking During Chemotherapy information, [facility]

## 2024-02-26 DIAGNOSIS — C79.51 SECONDARY MALIGNANCY OF BONE OF LOWER EXTREMITY (HCC): Primary | ICD-10-CM

## 2024-02-26 DIAGNOSIS — C22.1 CHOLANGIOCARCINOMA METASTATIC TO BONE (HCC): ICD-10-CM

## 2024-02-26 DIAGNOSIS — C79.51 CHOLANGIOCARCINOMA METASTATIC TO BONE (HCC): ICD-10-CM

## 2024-02-26 RX ORDER — PREDNISONE 10 MG/1
10 TABLET ORAL DAILY
Qty: 30 TABLET | Refills: 0 | Status: SHIPPED | OUTPATIENT
Start: 2024-02-26 | End: 2024-03-27

## 2024-02-28 ENCOUNTER — HOSPITAL ENCOUNTER (OUTPATIENT)
Dept: INFUSION THERAPY | Age: 79
Discharge: HOME | End: 2024-02-28
Payer: MEDICARE

## 2024-02-28 ENCOUNTER — OFFICE VISIT (OUTPATIENT)
Dept: ONCOLOGY | Age: 79
End: 2024-02-28

## 2024-02-28 ENCOUNTER — TELEPHONE (OUTPATIENT)
Dept: ONCOLOGY | Age: 79
End: 2024-02-28

## 2024-02-28 ENCOUNTER — TELEPHONE (OUTPATIENT)
Dept: INFUSION THERAPY | Age: 79
End: 2024-02-28

## 2024-02-28 VITALS
SYSTOLIC BLOOD PRESSURE: 113 MMHG | TEMPERATURE: 97 F | RESPIRATION RATE: 18 BRPM | OXYGEN SATURATION: 95 % | DIASTOLIC BLOOD PRESSURE: 69 MMHG | HEART RATE: 94 BPM

## 2024-02-28 VITALS
OXYGEN SATURATION: 97 % | SYSTOLIC BLOOD PRESSURE: 111 MMHG | BODY MASS INDEX: 27.85 KG/M2 | HEIGHT: 70 IN | DIASTOLIC BLOOD PRESSURE: 70 MMHG | HEART RATE: 92 BPM | TEMPERATURE: 97.3 F

## 2024-02-28 DIAGNOSIS — C79.51 CHOLANGIOCARCINOMA METASTATIC TO BONE (HCC): ICD-10-CM

## 2024-02-28 DIAGNOSIS — D50.0 IRON DEFICIENCY ANEMIA DUE TO CHRONIC BLOOD LOSS: Primary | ICD-10-CM

## 2024-02-28 DIAGNOSIS — C22.1 CHOLANGIOCARCINOMA METASTATIC TO BONE (HCC): ICD-10-CM

## 2024-02-28 DIAGNOSIS — D50.0 IRON DEFICIENCY ANEMIA DUE TO CHRONIC BLOOD LOSS: ICD-10-CM

## 2024-02-28 DIAGNOSIS — C79.51 CHOLANGIOCARCINOMA METASTATIC TO BONE (HCC): Primary | ICD-10-CM

## 2024-02-28 DIAGNOSIS — C22.1 CHOLANGIOCARCINOMA METASTATIC TO BONE (HCC): Primary | ICD-10-CM

## 2024-02-28 LAB
ALBUMIN SERPL-MCNC: 2.6 G/DL (ref 3.5–5.2)
ALP SERPL-CCNC: 518 U/L (ref 40–129)
ALT SERPL-CCNC: 11 U/L (ref 0–40)
ANION GAP SERPL CALCULATED.3IONS-SCNC: 11 MMOL/L (ref 7–16)
AST SERPL-CCNC: 25 U/L (ref 0–39)
BASOPHILS # BLD: 0 K/UL (ref 0–0.2)
BASOPHILS NFR BLD: 0 % (ref 0–2)
BILIRUB SERPL-MCNC: 0.5 MG/DL (ref 0–1.2)
BUN SERPL-MCNC: 13 MG/DL (ref 6–23)
CALCIUM SERPL-MCNC: 8.1 MG/DL (ref 8.6–10.2)
CHLORIDE SERPL-SCNC: 97 MMOL/L (ref 98–107)
CO2 SERPL-SCNC: 25 MMOL/L (ref 22–29)
CREAT SERPL-MCNC: 0.8 MG/DL (ref 0.7–1.2)
EOSINOPHIL # BLD: 0 K/UL (ref 0.05–0.5)
EOSINOPHILS RELATIVE PERCENT: 0 % (ref 0–6)
ERYTHROCYTE [DISTWIDTH] IN BLOOD BY AUTOMATED COUNT: 17.6 % (ref 11.5–15)
GFR SERPL CREATININE-BSD FRML MDRD: >60 ML/MIN/1.73M2
GLUCOSE SERPL-MCNC: 117 MG/DL (ref 74–99)
HCT VFR BLD AUTO: 26.7 % (ref 37–54)
HGB BLD-MCNC: 8.8 G/DL (ref 12.5–16.5)
LYMPHOCYTES NFR BLD: 0.49 K/UL (ref 1.5–4)
LYMPHOCYTES RELATIVE PERCENT: 2 % (ref 20–42)
MAGNESIUM SERPL-MCNC: 2 MG/DL (ref 1.6–2.6)
MCH RBC QN AUTO: 30.6 PG (ref 26–35)
MCHC RBC AUTO-ENTMCNC: 33 G/DL (ref 32–34.5)
MCV RBC AUTO: 92.7 FL (ref 80–99.9)
METAMYELOCYTES ABSOLUTE COUNT: 0.49 K/UL (ref 0–0.12)
METAMYELOCYTES: 2 % (ref 0–1)
MONOCYTES NFR BLD: 2.19 K/UL (ref 0.1–0.95)
MONOCYTES NFR BLD: 8 % (ref 2–12)
MYELOCYTES ABSOLUTE COUNT: 0.73 K/UL
MYELOCYTES: 3 %
NEUTROPHILS NFR BLD: 86 % (ref 43–80)
NEUTS SEG NFR BLD: 23.81 K/UL (ref 1.8–7.3)
NUCLEATED RED BLOOD CELLS: 2 PER 100 WBC
PLATELET # BLD AUTO: 333 K/UL (ref 130–450)
PMV BLD AUTO: 10.5 FL (ref 7–12)
POTASSIUM SERPL-SCNC: 4.2 MMOL/L (ref 3.5–5)
PROT SERPL-MCNC: 6 G/DL (ref 6.4–8.3)
RBC # BLD AUTO: 2.88 M/UL (ref 3.8–5.8)
RBC # BLD: ABNORMAL 10*6/UL
SODIUM SERPL-SCNC: 133 MMOL/L (ref 132–146)
TSH SERPL DL<=0.05 MIU/L-ACNC: 4.19 UIU/ML (ref 0.27–4.2)
WBC OTHER # BLD: 27.7 K/UL (ref 4.5–11.5)

## 2024-02-28 PROCEDURE — 6360000002 HC RX W HCPCS: Performed by: INTERNAL MEDICINE

## 2024-02-28 PROCEDURE — 85025 COMPLETE CBC W/AUTO DIFF WBC: CPT

## 2024-02-28 PROCEDURE — 96415 CHEMO IV INFUSION ADDL HR: CPT

## 2024-02-28 PROCEDURE — 80053 COMPREHEN METABOLIC PANEL: CPT

## 2024-02-28 PROCEDURE — 36415 COLL VENOUS BLD VENIPUNCTURE: CPT

## 2024-02-28 PROCEDURE — 96417 CHEMO IV INFUS EACH ADDL SEQ: CPT

## 2024-02-28 PROCEDURE — P9047 ALBUMIN (HUMAN), 25%, 50ML: HCPCS | Performed by: INTERNAL MEDICINE

## 2024-02-28 PROCEDURE — 96375 TX/PRO/DX INJ NEW DRUG ADDON: CPT

## 2024-02-28 PROCEDURE — 96366 THER/PROPH/DIAG IV INF ADDON: CPT

## 2024-02-28 PROCEDURE — 96413 CHEMO IV INFUSION 1 HR: CPT

## 2024-02-28 PROCEDURE — 96367 TX/PROPH/DG ADDL SEQ IV INF: CPT

## 2024-02-28 PROCEDURE — 2580000003 HC RX 258: Performed by: INTERNAL MEDICINE

## 2024-02-28 PROCEDURE — 84443 ASSAY THYROID STIM HORMONE: CPT

## 2024-02-28 PROCEDURE — 83735 ASSAY OF MAGNESIUM: CPT

## 2024-02-28 RX ORDER — ONDANSETRON 2 MG/ML
8 INJECTION INTRAMUSCULAR; INTRAVENOUS
OUTPATIENT
Start: 2024-03-06

## 2024-02-28 RX ORDER — ACETAMINOPHEN 325 MG/1
650 TABLET ORAL
Status: CANCELLED | OUTPATIENT
Start: 2024-02-28

## 2024-02-28 RX ORDER — SODIUM CHLORIDE 9 MG/ML
INJECTION, SOLUTION INTRAVENOUS CONTINUOUS
Status: CANCELLED | OUTPATIENT
Start: 2024-02-28

## 2024-02-28 RX ORDER — DIPHENHYDRAMINE HYDROCHLORIDE 50 MG/ML
50 INJECTION INTRAMUSCULAR; INTRAVENOUS
Status: CANCELLED | OUTPATIENT
Start: 2024-02-28

## 2024-02-28 RX ORDER — DEXAMETHASONE SODIUM PHOSPHATE 10 MG/ML
10 INJECTION, SOLUTION INTRAMUSCULAR; INTRAVENOUS ONCE
OUTPATIENT
Start: 2024-03-06 | End: 2024-03-06

## 2024-02-28 RX ORDER — SODIUM CHLORIDE 9 MG/ML
INJECTION, SOLUTION INTRAVENOUS CONTINUOUS
OUTPATIENT
Start: 2024-03-06

## 2024-02-28 RX ORDER — MEPERIDINE HYDROCHLORIDE 25 MG/ML
12.5 INJECTION INTRAMUSCULAR; INTRAVENOUS; SUBCUTANEOUS PRN
OUTPATIENT
Start: 2024-03-06

## 2024-02-28 RX ORDER — HEPARIN 100 UNIT/ML
500 SYRINGE INTRAVENOUS PRN
OUTPATIENT
Start: 2024-03-06

## 2024-02-28 RX ORDER — HEPARIN 100 UNIT/ML
500 SYRINGE INTRAVENOUS PRN
Status: DISCONTINUED | OUTPATIENT
Start: 2024-02-28 | End: 2024-02-29 | Stop reason: HOSPADM

## 2024-02-28 RX ORDER — PALONOSETRON HYDROCHLORIDE 0.05 MG/ML
0.25 INJECTION, SOLUTION INTRAVENOUS ONCE
Status: COMPLETED | OUTPATIENT
Start: 2024-02-28 | End: 2024-02-28

## 2024-02-28 RX ORDER — SODIUM CHLORIDE 9 MG/ML
5-250 INJECTION, SOLUTION INTRAVENOUS PRN
OUTPATIENT
Start: 2024-03-06

## 2024-02-28 RX ORDER — SODIUM CHLORIDE 9 MG/ML
5-250 INJECTION, SOLUTION INTRAVENOUS PRN
Status: DISCONTINUED | OUTPATIENT
Start: 2024-02-28 | End: 2024-02-29 | Stop reason: HOSPADM

## 2024-02-28 RX ORDER — PALONOSETRON HYDROCHLORIDE 0.05 MG/ML
0.25 INJECTION, SOLUTION INTRAVENOUS ONCE
OUTPATIENT
Start: 2024-03-06 | End: 2024-03-06

## 2024-02-28 RX ORDER — SODIUM CHLORIDE 0.9 % (FLUSH) 0.9 %
5-40 SYRINGE (ML) INJECTION PRN
OUTPATIENT
Start: 2024-03-06

## 2024-02-28 RX ORDER — HEPARIN 100 UNIT/ML
500 SYRINGE INTRAVENOUS PRN
Status: CANCELLED | OUTPATIENT
Start: 2024-02-28

## 2024-02-28 RX ORDER — SODIUM CHLORIDE 9 MG/ML
5-250 INJECTION, SOLUTION INTRAVENOUS PRN
Status: CANCELLED | OUTPATIENT
Start: 2024-02-28

## 2024-02-28 RX ORDER — DEXAMETHASONE SODIUM PHOSPHATE 10 MG/ML
10 INJECTION, SOLUTION INTRAMUSCULAR; INTRAVENOUS ONCE
Status: CANCELLED | OUTPATIENT
Start: 2024-02-28 | End: 2024-02-28

## 2024-02-28 RX ORDER — ACETAMINOPHEN 325 MG/1
650 TABLET ORAL
OUTPATIENT
Start: 2024-03-06

## 2024-02-28 RX ORDER — SODIUM CHLORIDE 0.9 % (FLUSH) 0.9 %
5-40 SYRINGE (ML) INJECTION PRN
Status: CANCELLED | OUTPATIENT
Start: 2024-02-28

## 2024-02-28 RX ORDER — MEPERIDINE HYDROCHLORIDE 25 MG/ML
12.5 INJECTION INTRAMUSCULAR; INTRAVENOUS; SUBCUTANEOUS PRN
Status: CANCELLED | OUTPATIENT
Start: 2024-02-28

## 2024-02-28 RX ORDER — FUROSEMIDE 10 MG/ML
40 INJECTION INTRAMUSCULAR; INTRAVENOUS ONCE
Status: COMPLETED | OUTPATIENT
Start: 2024-02-28 | End: 2024-02-28

## 2024-02-28 RX ORDER — DEXAMETHASONE SODIUM PHOSPHATE 10 MG/ML
10 INJECTION INTRAMUSCULAR; INTRAVENOUS ONCE
Status: COMPLETED | OUTPATIENT
Start: 2024-02-28 | End: 2024-02-28

## 2024-02-28 RX ORDER — ALBUTEROL SULFATE 90 UG/1
4 AEROSOL, METERED RESPIRATORY (INHALATION) PRN
Status: CANCELLED | OUTPATIENT
Start: 2024-02-28

## 2024-02-28 RX ORDER — EPINEPHRINE 1 MG/ML
0.3 INJECTION, SOLUTION, CONCENTRATE INTRAVENOUS PRN
Status: CANCELLED | OUTPATIENT
Start: 2024-02-28

## 2024-02-28 RX ORDER — ALBUMIN (HUMAN) 12.5 G/50ML
50 SOLUTION INTRAVENOUS ONCE
Status: COMPLETED | OUTPATIENT
Start: 2024-02-28 | End: 2024-02-28

## 2024-02-28 RX ORDER — DIPHENHYDRAMINE HYDROCHLORIDE 50 MG/ML
50 INJECTION INTRAMUSCULAR; INTRAVENOUS
OUTPATIENT
Start: 2024-03-06

## 2024-02-28 RX ORDER — EPINEPHRINE 1 MG/ML
0.3 INJECTION, SOLUTION, CONCENTRATE INTRAVENOUS PRN
OUTPATIENT
Start: 2024-03-06

## 2024-02-28 RX ORDER — SODIUM CHLORIDE 0.9 % (FLUSH) 0.9 %
5-40 SYRINGE (ML) INJECTION PRN
Status: DISCONTINUED | OUTPATIENT
Start: 2024-02-28 | End: 2024-02-29 | Stop reason: HOSPADM

## 2024-02-28 RX ORDER — ALBUTEROL SULFATE 90 UG/1
4 AEROSOL, METERED RESPIRATORY (INHALATION) PRN
OUTPATIENT
Start: 2024-03-06

## 2024-02-28 RX ORDER — PALONOSETRON HYDROCHLORIDE 0.05 MG/ML
0.25 INJECTION, SOLUTION INTRAVENOUS ONCE
Status: CANCELLED | OUTPATIENT
Start: 2024-02-28 | End: 2024-02-28

## 2024-02-28 RX ORDER — ONDANSETRON 2 MG/ML
8 INJECTION INTRAMUSCULAR; INTRAVENOUS
Status: CANCELLED | OUTPATIENT
Start: 2024-02-28

## 2024-02-28 RX ADMIN — FUROSEMIDE 40 MG: 10 INJECTION, SOLUTION INTRAMUSCULAR; INTRAVENOUS at 12:41

## 2024-02-28 RX ADMIN — SODIUM CHLORIDE 150 MG: 9 INJECTION, SOLUTION INTRAVENOUS at 13:17

## 2024-02-28 RX ADMIN — POTASSIUM CHLORIDE: 2 INJECTION, SOLUTION, CONCENTRATE INTRAVENOUS at 15:45

## 2024-02-28 RX ADMIN — ALBUMIN (HUMAN) 50 G: 25 SOLUTION INTRAVENOUS at 10:27

## 2024-02-28 RX ADMIN — SODIUM CHLORIDE 20 ML/HR: 9 INJECTION, SOLUTION INTRAVENOUS at 12:41

## 2024-02-28 RX ADMIN — HEPARIN 500 UNITS: 100 SYRINGE at 18:05

## 2024-02-28 RX ADMIN — DEXAMETHASONE SODIUM PHOSPHATE 10 MG: 10 INJECTION INTRAMUSCULAR; INTRAVENOUS at 12:49

## 2024-02-28 RX ADMIN — SODIUM CHLORIDE 1500 MG: 9 INJECTION, SOLUTION INTRAVENOUS at 13:46

## 2024-02-28 RX ADMIN — Medication 10 ML: at 18:04

## 2024-02-28 RX ADMIN — GEMCITABINE 1800 MG: 38 INJECTION, SOLUTION INTRAVENOUS at 15:07

## 2024-02-28 RX ADMIN — PALONOSETRON 0.25 MG: 0.25 INJECTION, SOLUTION INTRAVENOUS at 12:47

## 2024-02-28 ASSESSMENT — PAIN SCALES - GENERAL: PAINLEVEL_OUTOF10: 5

## 2024-02-28 ASSESSMENT — PAIN DESCRIPTION - DESCRIPTORS: DESCRIPTORS: ACHING;DISCOMFORT;SORE

## 2024-02-28 ASSESSMENT — PAIN DESCRIPTION - LOCATION: LOCATION: NECK;SHOULDER

## 2024-02-28 ASSESSMENT — PAIN DESCRIPTION - ORIENTATION: ORIENTATION: RIGHT

## 2024-02-28 NOTE — TELEPHONE ENCOUNTER
SW stopped to check in on pt and wife and offer support.  Pt denied any needs at this time.  Pt and wife were reminded of the roles of oncology SW and informed that they could always reach out when needed.  Pt and wife were thankful for SW stopping by.  Pt and wife were encouraged to reach out should any needs arise.      Elver Joseph MSW, LOYDW-S  Oncology Social Worker

## 2024-02-28 NOTE — PROGRESS NOTES
[facility]  Hematology/Oncology  Consult      Patient Name: Wilmer Fuentes  YOB: 1945  PCP: Todd Cross DO   Referring Provider:      Reason for Consultation:   No chief complaint on file.       History of Present Illness: 78-year-old man with past medical history relevant for hypertension, hyperlipidemia and anxiety hospitalized with progressively worsening dyspnea and leg edema.  No fever or chest pain nausea vomiting or hemoptysis.  His BMP on admission showed hyponatremia.  Serum creatinine was normal.  Hepatic panel with hypoalbuminemia with elevated alk phos with normal transaminases and normal bilirubin.  CBC with a hemoglobin of 13 with normal platelet and neutrophilic leukocytosis related to Solumedrol .  PSA was elevated at 20.9.  CEA was 5.0 with minimally elevated  at 37  Chest x-ray and CTA of the chest showed a large left pleural effusion with partial collapse of left upper lobe.  There was groundglass opacities in the right lung apex suggestive of infectious inflammatory process.  Suspicious sclerotic foci at T10 and T11 vertebral bodies noted which may represent metastatic disease .  Bone scans show multiple focal areas of increased activity suspicious for metastatic disease most prominent in the right posterior calvarium and thoracolumbar spine as well as left posterior iliac wing and right hip intertrochanteric area.  CT abdomen, pelvis and CT head pending  Left-sided thoracentesis performed and fluid was exudative with markedly elevated total proteins and LDH highly consistent with malignant effusion and cytology pending.        Diagnostic Data:     Past Medical History:   Diagnosis Date    Anxiety     Cancer (HCC)     Constipation     GERD (gastroesophageal reflux disease)     Hearing loss     Hyperlipidemia     Hypertension     Urinary incontinence        Patient Active Problem List    Diagnosis Date Noted    Antineoplastic chemotherapy induced anemia

## 2024-02-28 NOTE — PROGRESS NOTES
Patient tolerated infusion well. Patient alert and oriented x3.   No distress noted.   Vital signs stable.   Patient denies any new or worsening pain.  Patient educated on signs and symptoms of reaction to medication.   Educated patient on possible side effects and treatment of medication.  Patient verbalized understanding.   Offered patient education an/or discharge material.  Patient declined.   Patient denies any needs.  All questions answered.

## 2024-02-28 NOTE — TELEPHONE ENCOUNTER
Wilmer Fuentes  2/28/2024  Ht Readings from Last 1 Encounters:   02/28/24 1.778 m (5' 10\")     Wt Readings from Last 10 Encounters:   02/21/24 88 kg (194 lb 1.6 oz)   02/14/24 87 kg (191 lb 12.8 oz)   02/07/24 84.8 kg (187 lb)   02/02/24 83.9 kg (185 lb)   01/31/24 83 kg (183 lb)   01/31/24 83 kg (183 lb)   01/29/24 83.5 kg (184 lb 1.6 oz)   01/15/24 87.1 kg (192 lb)   01/11/24 83.8 kg (184 lb 12.8 oz)   06/22/23 88.9 kg (196 lb)     Assessment: Met with Wilmer and his wife Hallie while in infusion room today for Day 1 Cycle 2 durvalumab/cisplatin/gemzar for cholangiocarcinoma metastatic to bone. No weight obtained today. Na improved from 125 to 133 while following 1200ml fluid restrictions. Fluid restriction liberalized by adding another cup for 48oz per day. New information provided to wife breaking down fluid restriction. Boost VHC being used for his ONS or Ensure Protein Max. Reinforced with Wilmer importance of eating frequently throughout the day because he only eats small amounts. He voiced understanding. No further questions at this time. Encouraged them to call with questions or concerns.   Renita Morelos RD

## 2024-02-29 ENCOUNTER — TELEPHONE (OUTPATIENT)
Dept: CASE MANAGEMENT | Age: 79
End: 2024-02-29

## 2024-02-29 NOTE — TELEPHONE ENCOUNTER
Late entry 02/28/2024:Met with patient and his wife during his supportive therapy and cycle 2 day 1 chemo treatment. He was awake and engaging with the meeting. He stated he was surprised he was feeling better since receiving the supportive treatment. Patient's wife stated patient has been taking his pain medication as directed, he acknowledged his pain is under control. Patient's wife stated she's picking up the rest of the Declomycin prescription from their pharmacy tomorrow. Patient's wife stated patient has been taking his Prednisone and has been doing a little better with his eating. Patient is scheduled to see Dr. Davis, for lab work and cycle 2 day 8 on 03/06/2024. He and his wife denied needs today, encouraged to contact clinic with any, they verbalized understanding. Lisa Pérez  RN, ADN, BSE, OCN  Patient Nurse Navigator

## 2024-03-06 ENCOUNTER — OFFICE VISIT (OUTPATIENT)
Dept: ONCOLOGY | Age: 79
End: 2024-03-06

## 2024-03-06 ENCOUNTER — HOSPITAL ENCOUNTER (OUTPATIENT)
Dept: INFUSION THERAPY | Age: 79
Discharge: HOME | End: 2024-03-06
Payer: MEDICARE

## 2024-03-06 ENCOUNTER — TELEPHONE (OUTPATIENT)
Dept: ONCOLOGY | Age: 79
End: 2024-03-06

## 2024-03-06 VITALS
RESPIRATION RATE: 17 BRPM | TEMPERATURE: 97.3 F | SYSTOLIC BLOOD PRESSURE: 115 MMHG | HEART RATE: 80 BPM | DIASTOLIC BLOOD PRESSURE: 65 MMHG | OXYGEN SATURATION: 94 %

## 2024-03-06 VITALS
HEART RATE: 88 BPM | HEIGHT: 70 IN | DIASTOLIC BLOOD PRESSURE: 80 MMHG | TEMPERATURE: 97.2 F | WEIGHT: 182.3 LBS | SYSTOLIC BLOOD PRESSURE: 118 MMHG | BODY MASS INDEX: 26.1 KG/M2 | OXYGEN SATURATION: 94 %

## 2024-03-06 DIAGNOSIS — D50.0 IRON DEFICIENCY ANEMIA DUE TO CHRONIC BLOOD LOSS: ICD-10-CM

## 2024-03-06 DIAGNOSIS — C79.51 CHOLANGIOCARCINOMA METASTATIC TO BONE (HCC): Primary | ICD-10-CM

## 2024-03-06 DIAGNOSIS — C22.1 CHOLANGIOCARCINOMA METASTATIC TO BONE (HCC): ICD-10-CM

## 2024-03-06 DIAGNOSIS — C79.51 CHOLANGIOCARCINOMA METASTATIC TO BONE (HCC): ICD-10-CM

## 2024-03-06 DIAGNOSIS — C22.1 CHOLANGIOCARCINOMA METASTATIC TO BONE (HCC): Primary | ICD-10-CM

## 2024-03-06 DIAGNOSIS — C79.51 SECONDARY MALIGNANCY OF BONE OF LOWER EXTREMITY (HCC): ICD-10-CM

## 2024-03-06 DIAGNOSIS — D64.81 ANTINEOPLASTIC CHEMOTHERAPY INDUCED ANEMIA: ICD-10-CM

## 2024-03-06 DIAGNOSIS — T45.1X5A ANTINEOPLASTIC CHEMOTHERAPY INDUCED ANEMIA: ICD-10-CM

## 2024-03-06 LAB
ALBUMIN SERPL-MCNC: 2.7 G/DL (ref 3.5–5.2)
ALP SERPL-CCNC: 631 U/L (ref 40–129)
ALT SERPL-CCNC: 15 U/L (ref 0–40)
ANION GAP SERPL CALCULATED.3IONS-SCNC: 15 MMOL/L (ref 7–16)
AST SERPL-CCNC: 26 U/L (ref 0–39)
BASOPHILS # BLD: 0.11 K/UL (ref 0–0.2)
BASOPHILS NFR BLD: 1 % (ref 0–2)
BILIRUB SERPL-MCNC: 0.6 MG/DL (ref 0–1.2)
BUN SERPL-MCNC: 19 MG/DL (ref 6–23)
CALCIUM SERPL-MCNC: 8.7 MG/DL (ref 8.6–10.2)
CHLORIDE SERPL-SCNC: 98 MMOL/L (ref 98–107)
CO2 SERPL-SCNC: 25 MMOL/L (ref 22–29)
CREAT SERPL-MCNC: 0.7 MG/DL (ref 0.7–1.2)
EOSINOPHIL # BLD: 0 K/UL (ref 0.05–0.5)
EOSINOPHILS RELATIVE PERCENT: 0 % (ref 0–6)
ERYTHROCYTE [DISTWIDTH] IN BLOOD BY AUTOMATED COUNT: 17.7 % (ref 11.5–15)
GFR SERPL CREATININE-BSD FRML MDRD: >60 ML/MIN/1.73M2
GLUCOSE SERPL-MCNC: 114 MG/DL (ref 74–99)
HCT VFR BLD AUTO: 22.9 % (ref 37–54)
HGB BLD-MCNC: 7.5 G/DL (ref 12.5–16.5)
LYMPHOCYTES NFR BLD: 1.08 K/UL (ref 1.5–4)
LYMPHOCYTES RELATIVE PERCENT: 9 % (ref 20–42)
MAGNESIUM SERPL-MCNC: 1.9 MG/DL (ref 1.6–2.6)
MCH RBC QN AUTO: 30.5 PG (ref 26–35)
MCHC RBC AUTO-ENTMCNC: 32.8 G/DL (ref 32–34.5)
MCV RBC AUTO: 93.1 FL (ref 80–99.9)
MONOCYTES NFR BLD: 0.43 K/UL (ref 0.1–0.95)
MONOCYTES NFR BLD: 4 % (ref 2–12)
MYELOCYTES ABSOLUTE COUNT: 0.22 K/UL
MYELOCYTES: 2 %
NEUTROPHILS NFR BLD: 85 % (ref 43–80)
NEUTS SEG NFR BLD: 10.47 K/UL (ref 1.8–7.3)
NUCLEATED RED BLOOD CELLS: 1 PER 100 WBC
PLATELET # BLD AUTO: 78 K/UL (ref 130–450)
PLATELET CONFIRMATION: NORMAL
PMV BLD AUTO: 9.3 FL (ref 7–12)
POTASSIUM SERPL-SCNC: 3.2 MMOL/L (ref 3.5–5)
PROT SERPL-MCNC: 6 G/DL (ref 6.4–8.3)
RBC # BLD AUTO: 2.46 M/UL (ref 3.8–5.8)
RBC # BLD: ABNORMAL 10*6/UL
SODIUM SERPL-SCNC: 138 MMOL/L (ref 132–146)
WBC OTHER # BLD: 12.3 K/UL (ref 4.5–11.5)

## 2024-03-06 PROCEDURE — 36415 COLL VENOUS BLD VENIPUNCTURE: CPT

## 2024-03-06 PROCEDURE — 96367 TX/PROPH/DG ADDL SEQ IV INF: CPT

## 2024-03-06 PROCEDURE — 96413 CHEMO IV INFUSION 1 HR: CPT

## 2024-03-06 PROCEDURE — 80053 COMPREHEN METABOLIC PANEL: CPT

## 2024-03-06 PROCEDURE — 2580000003 HC RX 258: Performed by: INTERNAL MEDICINE

## 2024-03-06 PROCEDURE — 6360000002 HC RX W HCPCS: Performed by: NURSE PRACTITIONER

## 2024-03-06 PROCEDURE — 96415 CHEMO IV INFUSION ADDL HR: CPT

## 2024-03-06 PROCEDURE — 96375 TX/PRO/DX INJ NEW DRUG ADDON: CPT

## 2024-03-06 PROCEDURE — 6360000002 HC RX W HCPCS: Performed by: INTERNAL MEDICINE

## 2024-03-06 PROCEDURE — 83735 ASSAY OF MAGNESIUM: CPT

## 2024-03-06 PROCEDURE — 96361 HYDRATE IV INFUSION ADD-ON: CPT

## 2024-03-06 PROCEDURE — 96417 CHEMO IV INFUS EACH ADDL SEQ: CPT

## 2024-03-06 PROCEDURE — 85025 COMPLETE CBC W/AUTO DIFF WBC: CPT

## 2024-03-06 PROCEDURE — 96372 THER/PROPH/DIAG INJ SC/IM: CPT

## 2024-03-06 RX ORDER — ZOLEDRONIC ACID 0.04 MG/ML
4 INJECTION, SOLUTION INTRAVENOUS ONCE
Status: COMPLETED | OUTPATIENT
Start: 2024-03-06 | End: 2024-03-06

## 2024-03-06 RX ORDER — SODIUM CHLORIDE 0.9 % (FLUSH) 0.9 %
5-40 SYRINGE (ML) INJECTION PRN
Status: DISCONTINUED | OUTPATIENT
Start: 2024-03-06 | End: 2024-03-07 | Stop reason: HOSPADM

## 2024-03-06 RX ORDER — HEPARIN 100 UNIT/ML
500 SYRINGE INTRAVENOUS PRN
Status: DISCONTINUED | OUTPATIENT
Start: 2024-03-06 | End: 2024-03-07 | Stop reason: HOSPADM

## 2024-03-06 RX ORDER — POTASSIUM CHLORIDE 750 MG/1
10 TABLET, EXTENDED RELEASE ORAL DAILY
Qty: 15 TABLET | Refills: 0 | Status: SHIPPED | OUTPATIENT
Start: 2024-03-06 | End: 2024-03-21

## 2024-03-06 RX ORDER — EPINEPHRINE 1 MG/ML
0.3 INJECTION, SOLUTION, CONCENTRATE INTRAVENOUS PRN
OUTPATIENT
Start: 2024-03-08

## 2024-03-06 RX ORDER — PALONOSETRON HYDROCHLORIDE 0.05 MG/ML
0.25 INJECTION, SOLUTION INTRAVENOUS ONCE
Status: COMPLETED | OUTPATIENT
Start: 2024-03-06 | End: 2024-03-06

## 2024-03-06 RX ORDER — DEXAMETHASONE SODIUM PHOSPHATE 10 MG/ML
10 INJECTION INTRAMUSCULAR; INTRAVENOUS ONCE
Status: COMPLETED | OUTPATIENT
Start: 2024-03-06 | End: 2024-03-06

## 2024-03-06 RX ORDER — ONDANSETRON 2 MG/ML
8 INJECTION INTRAMUSCULAR; INTRAVENOUS
OUTPATIENT
Start: 2024-03-08

## 2024-03-06 RX ORDER — SODIUM CHLORIDE 9 MG/ML
5-250 INJECTION, SOLUTION INTRAVENOUS PRN
Status: DISCONTINUED | OUTPATIENT
Start: 2024-03-06 | End: 2024-03-07 | Stop reason: HOSPADM

## 2024-03-06 RX ORDER — FAMOTIDINE 10 MG/ML
20 INJECTION, SOLUTION INTRAVENOUS
OUTPATIENT
Start: 2024-03-08

## 2024-03-06 RX ORDER — SODIUM CHLORIDE 9 MG/ML
INJECTION, SOLUTION INTRAVENOUS CONTINUOUS
OUTPATIENT
Start: 2024-03-08

## 2024-03-06 RX ORDER — ACETAMINOPHEN 325 MG/1
650 TABLET ORAL
OUTPATIENT
Start: 2024-03-08

## 2024-03-06 RX ORDER — DIPHENHYDRAMINE HYDROCHLORIDE 50 MG/ML
50 INJECTION INTRAMUSCULAR; INTRAVENOUS
OUTPATIENT
Start: 2024-03-08

## 2024-03-06 RX ADMIN — SODIUM CHLORIDE 150 MG: 9 INJECTION, SOLUTION INTRAVENOUS at 11:28

## 2024-03-06 RX ADMIN — DARBEPOETIN ALFA 200 MCG: 200 INJECTION, SOLUTION INTRAVENOUS; SUBCUTANEOUS at 11:06

## 2024-03-06 RX ADMIN — SODIUM CHLORIDE, PRESERVATIVE FREE 10 ML: 5 INJECTION INTRAVENOUS at 15:20

## 2024-03-06 RX ADMIN — DEXAMETHASONE SODIUM PHOSPHATE 10 MG: 10 INJECTION INTRAMUSCULAR; INTRAVENOUS at 11:04

## 2024-03-06 RX ADMIN — Medication 500 UNITS: at 15:20

## 2024-03-06 RX ADMIN — SODIUM CHLORIDE 20 ML/HR: 9 INJECTION, SOLUTION INTRAVENOUS at 10:31

## 2024-03-06 RX ADMIN — POTASSIUM CHLORIDE: 2 INJECTION, SOLUTION, CONCENTRATE INTRAVENOUS at 13:06

## 2024-03-06 RX ADMIN — GEMCITABINE 1000 MG: 38 INJECTION, SOLUTION INTRAVENOUS at 12:29

## 2024-03-06 RX ADMIN — SODIUM CHLORIDE, PRESERVATIVE FREE 10 ML: 5 INJECTION INTRAVENOUS at 10:30

## 2024-03-06 RX ADMIN — ZOLEDRONIC ACID 4 MG: 0.04 INJECTION, SOLUTION INTRAVENOUS at 11:56

## 2024-03-06 RX ADMIN — PALONOSETRON 0.25 MG: 0.25 INJECTION, SOLUTION INTRAVENOUS at 11:02

## 2024-03-06 ASSESSMENT — PAIN SCALES - GENERAL: PAINLEVEL_OUTOF10: 3

## 2024-03-06 ASSESSMENT — PAIN DESCRIPTION - LOCATION: LOCATION: NECK;SHOULDER

## 2024-03-06 ASSESSMENT — PAIN DESCRIPTION - DESCRIPTORS: DESCRIPTORS: ACHING;DISCOMFORT;SORE

## 2024-03-06 ASSESSMENT — PAIN DESCRIPTION - ORIENTATION: ORIENTATION: RIGHT

## 2024-03-06 NOTE — PROGRESS NOTES
[facility]  Hematology/Oncology  Consult      Patient Name: Wilmer Fuentes  YOB: 1945  PCP: Todd Cross DO   Referring Provider:      Reason for Consultation:   No chief complaint on file.       History of Present Illness: 78-year-old man with past medical history relevant for hypertension, hyperlipidemia and anxiety hospitalized with progressively worsening dyspnea and leg edema.  No fever or chest pain nausea vomiting or hemoptysis.  His BMP on admission showed hyponatremia.  Serum creatinine was normal.  Hepatic panel with hypoalbuminemia with elevated alk phos with normal transaminases and normal bilirubin.  CBC with a hemoglobin of 13 with normal platelet and neutrophilic leukocytosis related to Solumedrol .  PSA was elevated at 20.9.  CEA was 5.0 with minimally elevated  at 37  Chest x-ray and CTA of the chest showed a large left pleural effusion with partial collapse of left upper lobe.  There was groundglass opacities in the right lung apex suggestive of infectious inflammatory process.  Suspicious sclerotic foci at T10 and T11 vertebral bodies noted which may represent metastatic disease .  Bone scans show multiple focal areas of increased activity suspicious for metastatic disease most prominent in the right posterior calvarium and thoracolumbar spine as well as left posterior iliac wing and right hip intertrochanteric area.  CT abdomen, pelvis and CT head pending  Left-sided thoracentesis performed and fluid was exudative with markedly elevated total proteins and LDH highly consistent with malignant effusion and cytology pending.        Diagnostic Data:     Past Medical History:   Diagnosis Date    Anxiety     Cancer (HCC)     Constipation     GERD (gastroesophageal reflux disease)     Hearing loss     Hyperlipidemia     Hypertension     Urinary incontinence        Patient Active Problem List    Diagnosis Date Noted    Antineoplastic chemotherapy induced anemia

## 2024-03-06 NOTE — TELEPHONE ENCOUNTER
Briefly met with pt and wife prior to them seeing physician today.  Pt and wife denied any needs at this time.  SW to stop by during treatment to follow up.  Pt and wife thankful for SW stopping today.        Elver CRUZ, THOMAS-S  Oncology Social Worker

## 2024-03-20 ENCOUNTER — HOSPITAL ENCOUNTER (OUTPATIENT)
Dept: INFUSION THERAPY | Age: 79
Discharge: HOME | End: 2024-03-20
Payer: MEDICARE

## 2024-03-20 ENCOUNTER — TELEPHONE (OUTPATIENT)
Dept: INFUSION THERAPY | Age: 79
End: 2024-03-20

## 2024-03-20 ENCOUNTER — OFFICE VISIT (OUTPATIENT)
Dept: PALLATIVE CARE | Age: 79
End: 2024-03-20
Payer: MEDICARE

## 2024-03-20 ENCOUNTER — OFFICE VISIT (OUTPATIENT)
Dept: ONCOLOGY | Age: 79
End: 2024-03-20
Payer: MEDICARE

## 2024-03-20 VITALS
BODY MASS INDEX: 25.99 KG/M2 | TEMPERATURE: 96.9 F | HEART RATE: 84 BPM | SYSTOLIC BLOOD PRESSURE: 104 MMHG | WEIGHT: 181.1 LBS | OXYGEN SATURATION: 96 % | DIASTOLIC BLOOD PRESSURE: 72 MMHG

## 2024-03-20 DIAGNOSIS — C79.51 CHOLANGIOCARCINOMA METASTATIC TO BONE (HCC): ICD-10-CM

## 2024-03-20 DIAGNOSIS — C22.1 CHOLANGIOCARCINOMA METASTATIC TO BONE (HCC): Primary | ICD-10-CM

## 2024-03-20 DIAGNOSIS — Z51.5 PALLIATIVE CARE BY SPECIALIST: Primary | ICD-10-CM

## 2024-03-20 DIAGNOSIS — D50.0 IRON DEFICIENCY ANEMIA DUE TO CHRONIC BLOOD LOSS: ICD-10-CM

## 2024-03-20 DIAGNOSIS — C79.51 CHOLANGIOCARCINOMA METASTATIC TO BONE (HCC): Primary | ICD-10-CM

## 2024-03-20 DIAGNOSIS — R63.0 POOR APPETITE: ICD-10-CM

## 2024-03-20 DIAGNOSIS — D64.81 ANTINEOPLASTIC CHEMOTHERAPY INDUCED ANEMIA: ICD-10-CM

## 2024-03-20 DIAGNOSIS — C22.1 CHOLANGIOCARCINOMA METASTATIC TO BONE (HCC): ICD-10-CM

## 2024-03-20 DIAGNOSIS — G89.3 PAIN DUE TO NEOPLASM: ICD-10-CM

## 2024-03-20 DIAGNOSIS — T45.1X5A ANTINEOPLASTIC CHEMOTHERAPY INDUCED ANEMIA: ICD-10-CM

## 2024-03-20 LAB
ALBUMIN SERPL-MCNC: 2.7 G/DL (ref 3.5–5.2)
ALP SERPL-CCNC: 1119 U/L (ref 40–129)
ALT SERPL-CCNC: 10 U/L (ref 0–40)
ANION GAP SERPL CALCULATED.3IONS-SCNC: 12 MMOL/L (ref 7–16)
AST SERPL-CCNC: 29 U/L (ref 0–39)
BASOPHILS # BLD: 0 K/UL (ref 0–0.2)
BASOPHILS NFR BLD: 0 % (ref 0–2)
BILIRUB SERPL-MCNC: 0.5 MG/DL (ref 0–1.2)
BUN SERPL-MCNC: 17 MG/DL (ref 6–23)
CALCIUM SERPL-MCNC: 7.8 MG/DL (ref 8.6–10.2)
CHLORIDE SERPL-SCNC: 98 MMOL/L (ref 98–107)
CO2 SERPL-SCNC: 27 MMOL/L (ref 22–29)
CREAT SERPL-MCNC: 1 MG/DL (ref 0.7–1.2)
EOSINOPHIL # BLD: 0 K/UL (ref 0.05–0.5)
EOSINOPHILS RELATIVE PERCENT: 0 % (ref 0–6)
ERYTHROCYTE [DISTWIDTH] IN BLOOD BY AUTOMATED COUNT: 27.2 % (ref 11.5–15)
GFR SERPL CREATININE-BSD FRML MDRD: >60 ML/MIN/1.73M2
GLUCOSE SERPL-MCNC: 121 MG/DL (ref 74–99)
HCT VFR BLD AUTO: 22.2 % (ref 37–54)
HGB BLD-MCNC: 6.6 G/DL (ref 12.5–16.5)
LYMPHOCYTES NFR BLD: 0.79 K/UL (ref 1.5–4)
LYMPHOCYTES RELATIVE PERCENT: 7 % (ref 20–42)
MAGNESIUM SERPL-MCNC: 1.7 MG/DL (ref 1.6–2.6)
MCH RBC QN AUTO: 30.8 PG (ref 26–35)
MCHC RBC AUTO-ENTMCNC: 29.7 G/DL (ref 32–34.5)
MCV RBC AUTO: 103.7 FL (ref 80–99.9)
MONOCYTES NFR BLD: 0.98 K/UL (ref 0.1–0.95)
MONOCYTES NFR BLD: 9 % (ref 2–12)
NEUTROPHILS NFR BLD: 84 % (ref 43–80)
NEUTS SEG NFR BLD: 9.43 K/UL (ref 1.8–7.3)
PLATELET # BLD AUTO: 243 K/UL (ref 130–450)
PMV BLD AUTO: 11.1 FL (ref 7–12)
POTASSIUM SERPL-SCNC: 3.5 MMOL/L (ref 3.5–5)
PROT SERPL-MCNC: 5.5 G/DL (ref 6.4–8.3)
RBC # BLD AUTO: 2.14 M/UL (ref 3.8–5.8)
RBC # BLD: ABNORMAL 10*6/UL
SODIUM SERPL-SCNC: 137 MMOL/L (ref 132–146)
WBC OTHER # BLD: 11.2 K/UL (ref 4.5–11.5)

## 2024-03-20 PROCEDURE — 99211 OFF/OP EST MAY X REQ PHY/QHP: CPT | Performed by: NURSE PRACTITIONER

## 2024-03-20 PROCEDURE — 99212 OFFICE O/P EST SF 10 MIN: CPT

## 2024-03-20 PROCEDURE — 83735 ASSAY OF MAGNESIUM: CPT

## 2024-03-20 PROCEDURE — 6360000002 HC RX W HCPCS: Performed by: NURSE PRACTITIONER

## 2024-03-20 PROCEDURE — 85025 COMPLETE CBC W/AUTO DIFF WBC: CPT

## 2024-03-20 PROCEDURE — 96372 THER/PROPH/DIAG INJ SC/IM: CPT

## 2024-03-20 PROCEDURE — 80053 COMPREHEN METABOLIC PANEL: CPT

## 2024-03-20 PROCEDURE — 36415 COLL VENOUS BLD VENIPUNCTURE: CPT

## 2024-03-20 RX ORDER — ONDANSETRON 2 MG/ML
8 INJECTION INTRAMUSCULAR; INTRAVENOUS
Status: CANCELLED | OUTPATIENT
Start: 2024-03-27

## 2024-03-20 RX ORDER — ACETAMINOPHEN 325 MG/1
650 TABLET ORAL
Status: CANCELLED | OUTPATIENT
Start: 2024-03-27

## 2024-03-20 RX ORDER — DIPHENHYDRAMINE HYDROCHLORIDE 50 MG/ML
50 INJECTION INTRAMUSCULAR; INTRAVENOUS
Status: CANCELLED | OUTPATIENT
Start: 2024-03-27

## 2024-03-20 RX ORDER — SODIUM CHLORIDE 9 MG/ML
INJECTION, SOLUTION INTRAVENOUS CONTINUOUS
Status: CANCELLED | OUTPATIENT
Start: 2024-03-27

## 2024-03-20 RX ORDER — EPINEPHRINE 1 MG/ML
0.3 INJECTION, SOLUTION, CONCENTRATE INTRAVENOUS PRN
Status: CANCELLED | OUTPATIENT
Start: 2024-03-27

## 2024-03-20 RX ORDER — FAMOTIDINE 10 MG/ML
20 INJECTION, SOLUTION INTRAVENOUS
Status: CANCELLED | OUTPATIENT
Start: 2024-03-27

## 2024-03-20 RX ADMIN — DARBEPOETIN ALFA 200 MCG: 200 INJECTION, SOLUTION INTRAVENOUS; SUBCUTANEOUS at 10:36

## 2024-03-20 NOTE — TELEPHONE ENCOUNTER
Said RN called and left voicemail stating patient's pleurex cath needs to be drained twice weekly on Mondays and Thursdays for a max amount of 300 ml. Asked for call back to confirm receipt of message.  Blossom Padilla RN, BSN, OCN 3/20/24 [id number]

## 2024-03-20 NOTE — PROGRESS NOTES
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  No fever or chest pain nausea vomiting or hemoptysis.  His BMP on admission showed hyponatremia.  Serum creatinine was normal.  Hepatic panel with hypoalbuminemia with elevated alk phos with normal transaminases and normal bilirubin.  CBC with a hemoglobin of 13 with normal platelet and neutrophilic leukocytosis related to Solumedrol .  PSA was elevated at 20.9.  CEA was 5.0 with minimally elevated  at 37  Chest x-ray and CTA of the chest showed a large left pleural effusion with partial collapse of left upper lobe.  There was groundglass opacities in the right lung apex suggestive of infectious inflammatory process.  Suspicious sclerotic foci at T10 and T11 vertebral bodies noted which may represent metastatic disease .  Bone scans show multiple focal areas of increased activity suspicious for metastatic disease most prominent in the right posterior calvarium and thoracolumbar spine as well as left posterior iliac wing and right hip intertrochanteric area.  CT abdomen, pelvis and CT head pending  Left-sided thoracentesis performed and fluid was exudative with markedly elevated total proteins and LDH highly consistent with malignant effusion and cytology pending.        Diagnostic Data:     Past Medical History:   Diagnosis Date    Anxiety     Cancer (HCC)     Constipation     GERD (gastroesophageal reflux disease)     Hearing loss     Hyperlipidemia     Hypertension     Urinary incontinence        Patient Active Problem List

## 2024-03-20 NOTE — PROGRESS NOTES
depressed Not depressed Not depressed   Anxiety Score Not anxious Not anxious 2   Drowsiness Score 5 2 2   Appetite Score 5 4 4   Wellbeing Score 5 5 1   Dyspnea Score 6 7 5   Other Problem Score 6 Best possible response 2   Total Assessment Score(calculated) 41 33 27     Assessed by: patient and provider.    Current Medications:  Medications reviewed: yes    Controlled Substances Monitoring: [place]ARRS reviewed 3/20/24.    Prasanth Valenzuela, APRN - CNP  Palliative Medicine    MDM/Time:  The total encounter time on this service date was 25 minutes, which was spent performing a face-to-face encounter and personally completing the provider-level activities documented in the note.  This includes time spent prior to the visit and after the visit in direct care of the patient.  This time does not include time spent in any separately reportable services.        Thank you for allowing Palliative Medicine to participate in the care of Wilmer Fuentes.    Note: This report was completed using [employer] voiced recognition software.  Every effort has been made to ensure accuracy; however, inadvertent computerized transcription errors may be present.

## 2024-03-27 ENCOUNTER — HOSPITAL ENCOUNTER (OUTPATIENT)
Dept: INFUSION THERAPY | Age: 79
Discharge: HOME | End: 2024-03-27
Payer: MEDICARE

## 2024-03-27 ENCOUNTER — OFFICE VISIT (OUTPATIENT)
Dept: ONCOLOGY | Age: 79
End: 2024-03-27

## 2024-03-27 ENCOUNTER — TELEPHONE (OUTPATIENT)
Dept: ONCOLOGY | Age: 79
End: 2024-03-27

## 2024-03-27 VITALS
HEIGHT: 70 IN | SYSTOLIC BLOOD PRESSURE: 105 MMHG | TEMPERATURE: 97.2 F | BODY MASS INDEX: 25.68 KG/M2 | DIASTOLIC BLOOD PRESSURE: 69 MMHG | WEIGHT: 179.4 LBS | HEART RATE: 71 BPM | OXYGEN SATURATION: 98 %

## 2024-03-27 VITALS
SYSTOLIC BLOOD PRESSURE: 136 MMHG | OXYGEN SATURATION: 95 % | HEART RATE: 89 BPM | TEMPERATURE: 97.4 F | RESPIRATION RATE: 18 BRPM | DIASTOLIC BLOOD PRESSURE: 75 MMHG

## 2024-03-27 DIAGNOSIS — C79.51 CHOLANGIOCARCINOMA METASTATIC TO BONE (HCC): ICD-10-CM

## 2024-03-27 DIAGNOSIS — C79.51 SECONDARY MALIGNANCY OF BONE OF LOWER EXTREMITY (HCC): Primary | ICD-10-CM

## 2024-03-27 DIAGNOSIS — T45.1X5A ANTINEOPLASTIC CHEMOTHERAPY INDUCED ANEMIA: ICD-10-CM

## 2024-03-27 DIAGNOSIS — C22.1 CHOLANGIOCARCINOMA METASTATIC TO BONE (HCC): ICD-10-CM

## 2024-03-27 DIAGNOSIS — D50.0 IRON DEFICIENCY ANEMIA DUE TO CHRONIC BLOOD LOSS: Primary | ICD-10-CM

## 2024-03-27 DIAGNOSIS — D50.0 IRON DEFICIENCY ANEMIA DUE TO CHRONIC BLOOD LOSS: ICD-10-CM

## 2024-03-27 DIAGNOSIS — D64.81 ANTINEOPLASTIC CHEMOTHERAPY INDUCED ANEMIA: ICD-10-CM

## 2024-03-27 LAB
ALBUMIN SERPL-MCNC: 2.7 G/DL (ref 3.5–5.2)
ALP SERPL-CCNC: 870 U/L (ref 40–129)
ALT SERPL-CCNC: 13 U/L (ref 0–40)
ANION GAP SERPL CALCULATED.3IONS-SCNC: 13 MMOL/L (ref 7–16)
AST SERPL-CCNC: 36 U/L (ref 0–39)
BASOPHILS # BLD: 0 K/UL (ref 0–0.2)
BASOPHILS NFR BLD: 0 % (ref 0–2)
BILIRUB SERPL-MCNC: 0.6 MG/DL (ref 0–1.2)
BUN SERPL-MCNC: 17 MG/DL (ref 6–23)
CALCIUM SERPL-MCNC: 7.7 MG/DL (ref 8.6–10.2)
CHLORIDE SERPL-SCNC: 98 MMOL/L (ref 98–107)
CO2 SERPL-SCNC: 25 MMOL/L (ref 22–29)
CREAT SERPL-MCNC: 0.9 MG/DL (ref 0.7–1.2)
EOSINOPHIL # BLD: 0 K/UL (ref 0.05–0.5)
EOSINOPHILS RELATIVE PERCENT: 0 % (ref 0–6)
ERYTHROCYTE [DISTWIDTH] IN BLOOD BY AUTOMATED COUNT: 24.6 % (ref 11.5–15)
GFR SERPL CREATININE-BSD FRML MDRD: 86 ML/MIN/1.73M2
GLUCOSE SERPL-MCNC: 116 MG/DL (ref 74–99)
HCT VFR BLD AUTO: 25.3 % (ref 37–54)
HGB BLD-MCNC: 7.8 G/DL (ref 12.5–16.5)
LYMPHOCYTES NFR BLD: 1.85 K/UL (ref 1.5–4)
LYMPHOCYTES RELATIVE PERCENT: 11 % (ref 20–42)
MAGNESIUM SERPL-MCNC: 1.8 MG/DL (ref 1.6–2.6)
MCH RBC QN AUTO: 31.6 PG (ref 26–35)
MCHC RBC AUTO-ENTMCNC: 30.8 G/DL (ref 32–34.5)
MCV RBC AUTO: 102.4 FL (ref 80–99.9)
MONOCYTES NFR BLD: 1.14 K/UL (ref 0.1–0.95)
MONOCYTES NFR BLD: 7 % (ref 2–12)
NEUTROPHILS NFR BLD: 82 % (ref 43–80)
NEUTS SEG NFR BLD: 13.41 K/UL (ref 1.8–7.3)
NUCLEATED RED BLOOD CELLS: 5 PER 100 WBC
PLATELET # BLD AUTO: 210 K/UL (ref 130–450)
PMV BLD AUTO: 10 FL (ref 7–12)
POTASSIUM SERPL-SCNC: 3.6 MMOL/L (ref 3.5–5)
PROT SERPL-MCNC: 6.2 G/DL (ref 6.4–8.3)
RBC # BLD AUTO: 2.47 M/UL (ref 3.8–5.8)
RBC # BLD: ABNORMAL 10*6/UL
SODIUM SERPL-SCNC: 136 MMOL/L (ref 132–146)
WBC OTHER # BLD: 16.4 K/UL (ref 4.5–11.5)

## 2024-03-27 PROCEDURE — 96417 CHEMO IV INFUS EACH ADDL SEQ: CPT

## 2024-03-27 PROCEDURE — 96375 TX/PRO/DX INJ NEW DRUG ADDON: CPT

## 2024-03-27 PROCEDURE — 2580000003 HC RX 258: Performed by: INTERNAL MEDICINE

## 2024-03-27 PROCEDURE — 6360000002 HC RX W HCPCS: Performed by: INTERNAL MEDICINE

## 2024-03-27 PROCEDURE — 85025 COMPLETE CBC W/AUTO DIFF WBC: CPT

## 2024-03-27 PROCEDURE — 96367 TX/PROPH/DG ADDL SEQ IV INF: CPT

## 2024-03-27 PROCEDURE — 96413 CHEMO IV INFUSION 1 HR: CPT

## 2024-03-27 PROCEDURE — 83735 ASSAY OF MAGNESIUM: CPT

## 2024-03-27 PROCEDURE — 80053 COMPREHEN METABOLIC PANEL: CPT

## 2024-03-27 PROCEDURE — 96372 THER/PROPH/DIAG INJ SC/IM: CPT

## 2024-03-27 PROCEDURE — 36415 COLL VENOUS BLD VENIPUNCTURE: CPT

## 2024-03-27 PROCEDURE — 96415 CHEMO IV INFUSION ADDL HR: CPT

## 2024-03-27 RX ORDER — ALBUTEROL SULFATE 90 UG/1
4 AEROSOL, METERED RESPIRATORY (INHALATION) PRN
Status: CANCELLED | OUTPATIENT
Start: 2024-03-27

## 2024-03-27 RX ORDER — ALBUTEROL SULFATE 90 UG/1
4 AEROSOL, METERED RESPIRATORY (INHALATION) PRN
OUTPATIENT
Start: 2024-04-03

## 2024-03-27 RX ORDER — DEXAMETHASONE SODIUM PHOSPHATE 10 MG/ML
10 INJECTION INTRAMUSCULAR; INTRAVENOUS ONCE
Status: COMPLETED | OUTPATIENT
Start: 2024-03-27 | End: 2024-03-27

## 2024-03-27 RX ORDER — PALONOSETRON HYDROCHLORIDE 0.05 MG/ML
0.25 INJECTION, SOLUTION INTRAVENOUS ONCE
Status: CANCELLED | OUTPATIENT
Start: 2024-03-27 | End: 2024-03-27

## 2024-03-27 RX ORDER — HEPARIN 100 UNIT/ML
500 SYRINGE INTRAVENOUS PRN
Status: CANCELLED | OUTPATIENT
Start: 2024-03-27

## 2024-03-27 RX ORDER — EPINEPHRINE 1 MG/ML
0.3 INJECTION, SOLUTION, CONCENTRATE INTRAVENOUS PRN
OUTPATIENT
Start: 2024-04-03

## 2024-03-27 RX ORDER — FAMOTIDINE 10 MG/ML
20 INJECTION, SOLUTION INTRAVENOUS
OUTPATIENT
Start: 2024-04-03

## 2024-03-27 RX ORDER — DIPHENHYDRAMINE HYDROCHLORIDE 50 MG/ML
50 INJECTION INTRAMUSCULAR; INTRAVENOUS
Status: CANCELLED | OUTPATIENT
Start: 2024-03-27

## 2024-03-27 RX ORDER — SODIUM CHLORIDE 9 MG/ML
5-250 INJECTION, SOLUTION INTRAVENOUS PRN
OUTPATIENT
Start: 2024-04-03

## 2024-03-27 RX ORDER — SODIUM CHLORIDE 9 MG/ML
5-250 INJECTION, SOLUTION INTRAVENOUS PRN
Status: DISCONTINUED | OUTPATIENT
Start: 2024-03-27 | End: 2024-03-28 | Stop reason: HOSPADM

## 2024-03-27 RX ORDER — DIPHENHYDRAMINE HYDROCHLORIDE 50 MG/ML
50 INJECTION INTRAMUSCULAR; INTRAVENOUS
Status: CANCELLED | OUTPATIENT
Start: 2024-04-03

## 2024-03-27 RX ORDER — HEPARIN 100 UNIT/ML
500 SYRINGE INTRAVENOUS PRN
Status: DISCONTINUED | OUTPATIENT
Start: 2024-03-27 | End: 2024-03-28 | Stop reason: HOSPADM

## 2024-03-27 RX ORDER — MEPERIDINE HYDROCHLORIDE 25 MG/ML
12.5 INJECTION INTRAMUSCULAR; INTRAVENOUS; SUBCUTANEOUS PRN
OUTPATIENT
Start: 2024-04-03

## 2024-03-27 RX ORDER — DIPHENHYDRAMINE HYDROCHLORIDE 50 MG/ML
50 INJECTION INTRAMUSCULAR; INTRAVENOUS
OUTPATIENT
Start: 2024-04-03

## 2024-03-27 RX ORDER — SODIUM CHLORIDE 9 MG/ML
INJECTION, SOLUTION INTRAVENOUS CONTINUOUS
Status: CANCELLED | OUTPATIENT
Start: 2024-04-03

## 2024-03-27 RX ORDER — ONDANSETRON 2 MG/ML
8 INJECTION INTRAMUSCULAR; INTRAVENOUS
Status: CANCELLED | OUTPATIENT
Start: 2024-04-03

## 2024-03-27 RX ORDER — DEXAMETHASONE SODIUM PHOSPHATE 10 MG/ML
10 INJECTION, SOLUTION INTRAMUSCULAR; INTRAVENOUS ONCE
OUTPATIENT
Start: 2024-04-03 | End: 2024-04-03

## 2024-03-27 RX ORDER — ONDANSETRON 2 MG/ML
8 INJECTION INTRAMUSCULAR; INTRAVENOUS
OUTPATIENT
Start: 2024-04-03

## 2024-03-27 RX ORDER — FAMOTIDINE 10 MG/ML
20 INJECTION, SOLUTION INTRAVENOUS
Status: CANCELLED | OUTPATIENT
Start: 2024-04-03

## 2024-03-27 RX ORDER — ONDANSETRON 2 MG/ML
8 INJECTION INTRAMUSCULAR; INTRAVENOUS
Status: CANCELLED | OUTPATIENT
Start: 2024-03-27

## 2024-03-27 RX ORDER — ACETAMINOPHEN 325 MG/1
650 TABLET ORAL
OUTPATIENT
Start: 2024-04-03

## 2024-03-27 RX ORDER — SODIUM CHLORIDE 0.9 % (FLUSH) 0.9 %
5-40 SYRINGE (ML) INJECTION PRN
Status: DISCONTINUED | OUTPATIENT
Start: 2024-03-27 | End: 2024-03-28 | Stop reason: HOSPADM

## 2024-03-27 RX ORDER — SODIUM CHLORIDE 9 MG/ML
INJECTION, SOLUTION INTRAVENOUS CONTINUOUS
OUTPATIENT
Start: 2024-04-03

## 2024-03-27 RX ORDER — HEPARIN 100 UNIT/ML
500 SYRINGE INTRAVENOUS PRN
OUTPATIENT
Start: 2024-04-03

## 2024-03-27 RX ORDER — MEPERIDINE HYDROCHLORIDE 25 MG/ML
12.5 INJECTION INTRAMUSCULAR; INTRAVENOUS; SUBCUTANEOUS PRN
Status: CANCELLED | OUTPATIENT
Start: 2024-03-27

## 2024-03-27 RX ORDER — SODIUM CHLORIDE 0.9 % (FLUSH) 0.9 %
5-40 SYRINGE (ML) INJECTION PRN
Status: CANCELLED | OUTPATIENT
Start: 2024-03-27

## 2024-03-27 RX ORDER — EPINEPHRINE 1 MG/ML
0.3 INJECTION, SOLUTION, CONCENTRATE INTRAVENOUS PRN
Status: CANCELLED | OUTPATIENT
Start: 2024-03-27

## 2024-03-27 RX ORDER — ACETAMINOPHEN 325 MG/1
650 TABLET ORAL
Status: CANCELLED | OUTPATIENT
Start: 2024-03-27

## 2024-03-27 RX ORDER — ACETAMINOPHEN 325 MG/1
650 TABLET ORAL
Status: CANCELLED | OUTPATIENT
Start: 2024-04-03

## 2024-03-27 RX ORDER — EPINEPHRINE 1 MG/ML
0.3 INJECTION, SOLUTION, CONCENTRATE INTRAVENOUS PRN
Status: CANCELLED | OUTPATIENT
Start: 2024-04-03

## 2024-03-27 RX ORDER — DEXAMETHASONE SODIUM PHOSPHATE 10 MG/ML
10 INJECTION, SOLUTION INTRAMUSCULAR; INTRAVENOUS ONCE
Status: CANCELLED | OUTPATIENT
Start: 2024-03-27 | End: 2024-03-27

## 2024-03-27 RX ORDER — SODIUM CHLORIDE 9 MG/ML
5-250 INJECTION, SOLUTION INTRAVENOUS PRN
Status: CANCELLED | OUTPATIENT
Start: 2024-03-27

## 2024-03-27 RX ORDER — PALONOSETRON HYDROCHLORIDE 0.05 MG/ML
0.25 INJECTION, SOLUTION INTRAVENOUS ONCE
Status: COMPLETED | OUTPATIENT
Start: 2024-03-27 | End: 2024-03-27

## 2024-03-27 RX ORDER — SODIUM CHLORIDE 0.9 % (FLUSH) 0.9 %
5-40 SYRINGE (ML) INJECTION PRN
OUTPATIENT
Start: 2024-04-03

## 2024-03-27 RX ORDER — PALONOSETRON HYDROCHLORIDE 0.05 MG/ML
0.25 INJECTION, SOLUTION INTRAVENOUS ONCE
OUTPATIENT
Start: 2024-04-03 | End: 2024-04-03

## 2024-03-27 RX ORDER — SODIUM CHLORIDE 9 MG/ML
INJECTION, SOLUTION INTRAVENOUS CONTINUOUS
Status: CANCELLED | OUTPATIENT
Start: 2024-03-27

## 2024-03-27 RX ADMIN — DEXAMETHASONE SODIUM PHOSPHATE 10 MG: 10 INJECTION INTRAMUSCULAR; INTRAVENOUS at 11:19

## 2024-03-27 RX ADMIN — SODIUM CHLORIDE, PRESERVATIVE FREE 10 ML: 5 INJECTION INTRAVENOUS at 16:11

## 2024-03-27 RX ADMIN — Medication 500 UNITS: at 16:11

## 2024-03-27 RX ADMIN — SODIUM CHLORIDE 200 ML/HR: 9 INJECTION, SOLUTION INTRAVENOUS at 11:17

## 2024-03-27 RX ADMIN — POTASSIUM CHLORIDE: 2 INJECTION, SOLUTION, CONCENTRATE INTRAVENOUS at 13:55

## 2024-03-27 RX ADMIN — SODIUM CHLORIDE, PRESERVATIVE FREE 10 ML: 5 INJECTION INTRAVENOUS at 11:20

## 2024-03-27 RX ADMIN — FOSAPREPITANT 150 MG: 150 INJECTION, POWDER, LYOPHILIZED, FOR SOLUTION INTRAVENOUS at 11:24

## 2024-03-27 RX ADMIN — GEMCITABINE HYDROCHLORIDE 1000 MG: 1 INJECTION, SOLUTION INTRAVENOUS at 13:14

## 2024-03-27 RX ADMIN — DARBEPOETIN ALFA 100 MCG: 100 INJECTION, SOLUTION INTRAVENOUS; SUBCUTANEOUS at 13:56

## 2024-03-27 RX ADMIN — PALONOSETRON 0.25 MG: 0.25 INJECTION, SOLUTION INTRAVENOUS at 11:17

## 2024-03-27 RX ADMIN — SODIUM CHLORIDE 1500 MG: 9 INJECTION, SOLUTION INTRAVENOUS at 11:59

## 2024-03-27 NOTE — PROGRESS NOTES
[facility]  Hematology/Oncology  Consult      Patient Name: Wilmer Fuentes  YOB: 1945  PCP: Todd Cross DO   Referring Provider:      Reason for Consultation:   No chief complaint on file.       History of Present Illness: 78-year-old man with past medical history relevant for hypertension, hyperlipidemia and anxiety hospitalized with progressively worsening dyspnea and leg edema.  No fever or chest pain nausea vomiting or hemoptysis.  His BMP on admission showed hyponatremia.  Serum creatinine was normal.  Hepatic panel with hypoalbuminemia with elevated alk phos with normal transaminases and normal bilirubin.  CBC with a hemoglobin of 13 with normal platelet and neutrophilic leukocytosis related to Solumedrol .  PSA was elevated at 20.9.  CEA was 5.0 with minimally elevated  at 37  Chest x-ray and CTA of the chest showed a large left pleural effusion with partial collapse of left upper lobe.  There was groundglass opacities in the right lung apex suggestive of infectious inflammatory process.  Suspicious sclerotic foci at T10 and T11 vertebral bodies noted which may represent metastatic disease .  Bone scans show multiple focal areas of increased activity suspicious for metastatic disease most prominent in the right posterior calvarium and thoracolumbar spine as well as left posterior iliac wing and right hip intertrochanteric area.  CT abdomen, pelvis and CT head pending  Left-sided thoracentesis performed and fluid was exudative with markedly elevated total proteins and LDH highly consistent with malignant effusion and cytology pending.        Diagnostic Data:     Past Medical History:   Diagnosis Date    Anxiety     Cancer (HCC)     Constipation     GERD (gastroesophageal reflux disease)     Hearing loss     Hyperlipidemia     Hypertension     Urinary incontinence        Patient Active Problem List    Diagnosis Date Noted    Antineoplastic chemotherapy induced anemia

## 2024-03-28 NOTE — TELEPHONE ENCOUNTER
SW briefly followed up with pt and wife during treatment.  Pt and wife reported that things have been going well at this time.  Pt has received a lot of assistance from family and friends.  Pt denied any needs at this time.  Pt was encouraged to reach out this provider should any other needs arise.  Pt and wife reported understanding and were thankful for SW stopping today.       Elver Joseph MSW, THOMAS-S  Oncology Social Worker

## 2024-04-03 ENCOUNTER — HOSPITAL ENCOUNTER (OUTPATIENT)
Dept: INFUSION THERAPY | Age: 79
End: 2024-04-03

## (undated) DEVICE — MARKER,SKIN,WI/RULER AND LABELS: Brand: MEDLINE

## (undated) DEVICE — APPLICATOR MEDICATED 26 CC SOLUTION HI LT ORNG CHLORAPREP

## (undated) DEVICE — BLADE ES ELASTOMERIC COAT INSUL DURABLE BEND UPTO 90DEG

## (undated) DEVICE — LIQUIBAND RAPID ADHESIVE 36/CS 0.8ML: Brand: MEDLINE

## (undated) DEVICE — NEEDLE HYPO 25GA L1.5IN BLU POLYPR HUB S STL REG BVL STR

## (undated) DEVICE — BASIC DOUBLE BASIN 2-LF: Brand: MEDLINE INDUSTRIES, INC.

## (undated) DEVICE — HUMBLES LAPWRAP® (10/CASE): Brand: HUMBLES LAPWRAP®

## (undated) DEVICE — BLADE,STAINLESS-STEEL,15,STRL,DISPOSABLE: Brand: MEDLINE

## (undated) DEVICE — NEEDLE FLTR 18GA L1.5IN MEM THK5UM BLNT DISP

## (undated) DEVICE — PACK,LAPAROTOMY,NO GOWNS: Brand: MEDLINE

## (undated) DEVICE — 15.5F X 24CM TITAN HD CATHETER (CUFF 19CM FROM  TIP): Brand: TITAN HD

## (undated) DEVICE — GAUZE,SPONGE,4"X4",16PLY,XRAY,STRL,LF: Brand: MEDLINE

## (undated) DEVICE — GLOVE ORANGE PI 8   MSG9080

## (undated) DEVICE — ELECTRODE PT RET AD L9FT HI MOIST COND ADH HYDRGEL CORDED

## (undated) DEVICE — SCANLAN® SUTURE BOOT™ INSTRUMENT JAW COVERS - ORIGINAL YELLOW, MINI PKG (3 PAIR/CARTRIDGE, 1 CARTRIDGE/PKG): Brand: SCANLAN® SUTURE BOOT™ INSTRUMENT JAW COVERS

## (undated) DEVICE — GARMENT,MEDLINE,DVT,INT,CALF,MED, GEN2: Brand: MEDLINE

## (undated) DEVICE — COVER,LIGHT HANDLE,FLX,1/PK: Brand: MEDLINE INDUSTRIES, INC.

## (undated) DEVICE — NDL CNTR 40CT FM MAG: Brand: MEDLINE INDUSTRIES, INC.

## (undated) DEVICE — 4-PORT MANIFOLD: Brand: NEPTUNE 2

## (undated) DEVICE — GLOVE ORANGE PI 7 1/2   MSG9075

## (undated) DEVICE — WIPES SKIN CLOTH READYPREP 9 X 10.5 IN 2% CHLORHEX GLUCONATE CHG PREOP

## (undated) DEVICE — TOWEL,OR,DSP,ST,BLUE,STD,6/PK,12PK/CS: Brand: MEDLINE

## (undated) DEVICE — SYRINGE MED 10ML TRNSLUC BRL PLUNG BLK MRK POLYPR CTRL

## (undated) DEVICE — GOWN,SIRUS,NONRNF,SETINSLV,XL,20/CS: Brand: MEDLINE

## (undated) DEVICE — GOWN,SIRUS,FABRNF,XL,20/CS: Brand: MEDLINE